# Patient Record
Sex: MALE | Race: WHITE | Employment: FULL TIME | URBAN - METROPOLITAN AREA
[De-identification: names, ages, dates, MRNs, and addresses within clinical notes are randomized per-mention and may not be internally consistent; named-entity substitution may affect disease eponyms.]

---

## 2021-03-09 ENCOUNTER — HOSPITAL ENCOUNTER (OUTPATIENT)
Age: 76
Setting detail: OUTPATIENT SURGERY
Discharge: HOME | End: 2021-03-09
Attending: INTERNAL MEDICINE | Admitting: INTERNAL MEDICINE
Payer: COMMERCIAL

## 2021-03-09 VITALS
RESPIRATION RATE: 20 BRPM | DIASTOLIC BLOOD PRESSURE: 66 MMHG | BODY MASS INDEX: 24.11 KG/M2 | WEIGHT: 178 LBS | TEMPERATURE: 97.8 F | SYSTOLIC BLOOD PRESSURE: 138 MMHG | HEART RATE: 76 BPM | HEIGHT: 72 IN | OXYGEN SATURATION: 100 %

## 2021-03-09 DIAGNOSIS — I35.0 AORTIC VALVE STENOSIS, ETIOLOGY OF CARDIAC VALVE DISEASE UNSPECIFIED: ICD-10-CM

## 2021-03-09 LAB
GLUCOSE BLD STRIP.AUTO-MCNC: 136 MG/DL (ref 65–100)
SERVICE CMNT-IMP: ABNORMAL

## 2021-03-09 PROCEDURE — 77030013744: Performed by: INTERNAL MEDICINE

## 2021-03-09 PROCEDURE — 74011250636 HC RX REV CODE- 250/636: Performed by: INTERNAL MEDICINE

## 2021-03-09 PROCEDURE — 77030004532 HC CATH ANGI DX IMP BSC -A: Performed by: INTERNAL MEDICINE

## 2021-03-09 PROCEDURE — 74011000250 HC RX REV CODE- 250: Performed by: INTERNAL MEDICINE

## 2021-03-09 PROCEDURE — 77030019569 HC BND COMPR RAD TERU -B: Performed by: INTERNAL MEDICINE

## 2021-03-09 PROCEDURE — 77030010221 HC SPLNT WR POS TELE -B: Performed by: INTERNAL MEDICINE

## 2021-03-09 PROCEDURE — 93454 CORONARY ARTERY ANGIO S&I: CPT | Performed by: INTERNAL MEDICINE

## 2021-03-09 PROCEDURE — 99152 MOD SED SAME PHYS/QHP 5/>YRS: CPT | Performed by: INTERNAL MEDICINE

## 2021-03-09 PROCEDURE — 77030015766: Performed by: INTERNAL MEDICINE

## 2021-03-09 PROCEDURE — C1769 GUIDE WIRE: HCPCS | Performed by: INTERNAL MEDICINE

## 2021-03-09 PROCEDURE — 74011000636 HC RX REV CODE- 636: Performed by: INTERNAL MEDICINE

## 2021-03-09 PROCEDURE — 82962 GLUCOSE BLOOD TEST: CPT

## 2021-03-09 PROCEDURE — C1894 INTRO/SHEATH, NON-LASER: HCPCS | Performed by: INTERNAL MEDICINE

## 2021-03-09 PROCEDURE — 99153 MOD SED SAME PHYS/QHP EA: CPT | Performed by: INTERNAL MEDICINE

## 2021-03-09 RX ORDER — SODIUM CHLORIDE 0.9 % (FLUSH) 0.9 %
5-40 SYRINGE (ML) INJECTION EVERY 8 HOURS
Status: DISCONTINUED | OUTPATIENT
Start: 2021-03-09 | End: 2021-03-09 | Stop reason: HOSPADM

## 2021-03-09 RX ORDER — ASPIRIN 81 MG/1
81 TABLET ORAL DAILY
Qty: 90 TAB | Refills: 4 | Status: SHIPPED | OUTPATIENT
Start: 2021-03-09 | End: 2021-03-18

## 2021-03-09 RX ORDER — ROSUVASTATIN CALCIUM 10 MG/1
10 TABLET, COATED ORAL
Qty: 90 TAB | Refills: 4 | Status: SHIPPED | OUTPATIENT
Start: 2021-03-09

## 2021-03-09 RX ORDER — MIDAZOLAM HYDROCHLORIDE 1 MG/ML
INJECTION, SOLUTION INTRAMUSCULAR; INTRAVENOUS AS NEEDED
Status: DISCONTINUED | OUTPATIENT
Start: 2021-03-09 | End: 2021-03-09 | Stop reason: HOSPADM

## 2021-03-09 RX ORDER — METFORMIN HYDROCHLORIDE 500 MG/1
500 TABLET ORAL
COMMUNITY

## 2021-03-09 RX ORDER — BISMUTH SUBSALICYLATE 262 MG
1 TABLET,CHEWABLE ORAL DAILY
COMMUNITY

## 2021-03-09 RX ORDER — HEPARIN SODIUM 200 [USP'U]/100ML
INJECTION, SOLUTION INTRAVENOUS
Status: COMPLETED | OUTPATIENT
Start: 2021-03-09 | End: 2021-03-09

## 2021-03-09 RX ORDER — DIPHENHYDRAMINE HYDROCHLORIDE 50 MG/ML
25 INJECTION, SOLUTION INTRAMUSCULAR; INTRAVENOUS
Status: DISCONTINUED | OUTPATIENT
Start: 2021-03-09 | End: 2021-03-09 | Stop reason: HOSPADM

## 2021-03-09 RX ORDER — PANTOPRAZOLE SODIUM 40 MG/1
40 TABLET, DELAYED RELEASE ORAL DAILY
COMMUNITY
End: 2022-04-20 | Stop reason: ALTCHOICE

## 2021-03-09 RX ORDER — HEPARIN SODIUM 1000 [USP'U]/ML
INJECTION, SOLUTION INTRAVENOUS; SUBCUTANEOUS AS NEEDED
Status: DISCONTINUED | OUTPATIENT
Start: 2021-03-09 | End: 2021-03-09 | Stop reason: HOSPADM

## 2021-03-09 RX ORDER — LIDOCAINE HYDROCHLORIDE 10 MG/ML
INJECTION INFILTRATION; PERINEURAL AS NEEDED
Status: DISCONTINUED | OUTPATIENT
Start: 2021-03-09 | End: 2021-03-09 | Stop reason: HOSPADM

## 2021-03-09 RX ORDER — FENTANYL CITRATE 50 UG/ML
INJECTION, SOLUTION INTRAMUSCULAR; INTRAVENOUS AS NEEDED
Status: DISCONTINUED | OUTPATIENT
Start: 2021-03-09 | End: 2021-03-09 | Stop reason: HOSPADM

## 2021-03-09 RX ORDER — LISINOPRIL 40 MG/1
40 TABLET ORAL DAILY
COMMUNITY
End: 2021-03-18

## 2021-03-09 RX ORDER — IBUPROFEN 200 MG
200 TABLET ORAL
COMMUNITY
End: 2021-03-09

## 2021-03-09 RX ORDER — SODIUM CHLORIDE 0.9 % (FLUSH) 0.9 %
5-40 SYRINGE (ML) INJECTION AS NEEDED
Status: DISCONTINUED | OUTPATIENT
Start: 2021-03-09 | End: 2021-03-09 | Stop reason: HOSPADM

## 2021-03-09 RX ORDER — NALOXONE HYDROCHLORIDE 0.4 MG/ML
0.4 INJECTION, SOLUTION INTRAMUSCULAR; INTRAVENOUS; SUBCUTANEOUS AS NEEDED
Status: DISCONTINUED | OUTPATIENT
Start: 2021-03-09 | End: 2021-03-09 | Stop reason: HOSPADM

## 2021-03-09 RX ORDER — SODIUM CHLORIDE 9 MG/ML
100 INJECTION, SOLUTION INTRAVENOUS CONTINUOUS
Status: DISCONTINUED | OUTPATIENT
Start: 2021-03-09 | End: 2021-03-09 | Stop reason: HOSPADM

## 2021-03-09 RX ORDER — VERAPAMIL HYDROCHLORIDE 2.5 MG/ML
INJECTION, SOLUTION INTRAVENOUS AS NEEDED
Status: DISCONTINUED | OUTPATIENT
Start: 2021-03-09 | End: 2021-03-09 | Stop reason: HOSPADM

## 2021-03-09 RX ORDER — SODIUM CHLORIDE 9 MG/ML
50 INJECTION, SOLUTION INTRAVENOUS CONTINUOUS
Status: DISCONTINUED | OUTPATIENT
Start: 2021-03-09 | End: 2021-03-09 | Stop reason: HOSPADM

## 2021-03-09 RX ORDER — AMIODARONE HYDROCHLORIDE 200 MG/1
200 TABLET ORAL
Status: ON HOLD | COMMUNITY
End: 2021-09-27

## 2021-03-09 NOTE — DISCHARGE INSTRUCTIONS
**You have only MILD blockages (at worst about 40%) that can be treated with aspirin and a cholesterol lowering medications. **Please start taking aspirin 81 mg daily and rosuvastatin 10 mg every night. **You will be contacted within about 7-10 days after your CT scan to schedule transcatheter aortic valve replacement (TAVR). YOUR CURRENT MEDICATIONS  Current Discharge Medication List      START taking these medications    Details   aspirin delayed-release 81 mg tablet Take 1 Tab by mouth daily. Indications: mild coronary artery disease  Qty: 90 Tab, Refills: 4      rosuvastatin (CRESTOR) 10 mg tablet Take 1 Tab by mouth nightly. Indications: hardening of the arteries due to plaque buildup (this medication helps lower cholesterol)  Qty: 90 Tab, Refills: 4         CONTINUE these medications which have NOT CHANGED    Details   amiodarone (CORDARONE) 200 mg tablet Take 200 mg by mouth.      lisinopriL (PRINIVIL, ZESTRIL) 40 mg tablet Take 40 mg by mouth daily. Only took 10 mg today pt stated due to his B/P      metFORMIN (GLUCOPHAGE) 500 mg tablet Take 500 mg by mouth daily (with breakfast). multivitamin (ONE A DAY) tablet Take 1 Tab by mouth daily. pantoprazole (PROTONIX) 40 mg tablet Take 40 mg by mouth daily. rivaroxaban (Xarelto) 20 mg tab tablet Take 20 mg by mouth daily (with dinner). STOP taking these medications       ibuprofen (AdviL) 200 mg tablet Comments:   Reason for Stopping: May increase risk for bleeding while on aspirin and rivaroxaban. Use acetaminophen (Tylenol) instead if needed for pain. After Your Cardiac Catheterization    Cardiac catheterization (also called cardiac cath) is an invasive imaging procedure that allows your doctor to look at your coronary arteries to diagnose coronary artery disease. It can also be used to measure pressures in your chambers, and evaluate the function of your heart.     Instructions for going home after Cardiac Catheterization    Care for the Catheter Insertion Site  Procedures may be performed in the femoral artery in the groin (in the area at the top of your thigh) or in the radial artery in your arm. When you go home, there will be a bandage (dressing) over the catheter insertion site (also called the wound site).  The morning after your procedure, you may take the dressing off. The easiest way to do this is when you are showering, get the tape and dressing wet and remove it.  After the bandage is removed, cover the area with a small adhesive bandage. It is normal for the catheter insertion site to be black and blue for a couple of days. The site may also be slightly swollen and pink, and there may be a small lump (about the size of a quarter) at the site.  Wash the catheter insertion site at least once daily with soap and water. Place soapy water on your hand or washcloth and gently wash the insertion site; do not rub.  Keep the area clean and dry when you are not showering.  Do not use powders, creams, lotions or ointment on the wound site.  Wear loose clothes and loose underwear.  Do not take a bath, tub soak, go in a Jacuzzi, or swim in a pool or lake for one week after the procedure.  You may notice a small lump at the site. This is normal and may last up to 6 weeks. CHECK THE CATHETER INSERTION SITE DAILY:    If bleeding at the cath site occurs, take a clean washcloth and apply direct pressure just above the puncture site for at least 15 minutes. Call 911 immediately if the bleeding is not controlled. Continue to apply direct pressure until an ambulance gets to your location. Do not try to drive yourself or have someone else drive you to the hospital.  Have the ambulance bring you to the emergency room. Activity Guidelines  Your doctor will tell you when you can resume activities. In general, you will need to take it easy for the first two days after you get home.  You can expect to feel tired and weak the day after the procedure. Take walks around your house and plan to rest during the day. For femoral cardiac cath   Do not strain during bowel movements for the first 3 to 4 days after the procedure to prevent bleeding from the catheter insertion site.  Avoid heavy lifting (more than 10 pounds) and pushing or pulling heavy objects for the first 5 to 7 days after the procedure.  Do not participate in strenuous activities for 5 days after the procedure. This includes most sports - jogging, golfing, play tennis, and bowling.  You may climb stairs if needed, but walk up and down the stairs more slowly than usual.    Gradually increase your activities until you reach your normal activity level within one week after the procedure. For radial cardiac cath   Do not participate in strenuous activities for 2 days after the procedure. This includes most sports - jogging, golfing, play tennis, and bowling.  Gradually increase your activities until you reach your normal activity level within two days after the procedure. Ask your doctor when it is safe to   Return to work.  Resume sexual activity.  Resume driving. Most people are able to resume driving within 24 hours after going home. Medications   Please review your medications with your doctor before you go home. Ask your doctor if you should continue taking the medications you were taking before the procedure.  If you have diabetes, your doctor may adjust your diabetes medications for one to two days after your procedure. Please be sure to ask for specific directions about taking your diabetes medication after the procedure.  Depending on the results of your procedure, your doctor may prescribe new medication. Please make sure you understand what medications you should be taking after the procedure and how often to take them.    You may use Tylenol 325mg 1-2 tablets every 6 hours as needed for pain or discomfort, unless otherwise instructed. If you have significant         discomfort more than 48 hours after your procedure, please call your physicians office.  If you take METFORMIN, do NOT take this for the next 2 days after your procedure. Fluid Guidelines  Be sure to drink eight to ten glasses of clear fluids (water is preferred) for the 24 hours to flush the contrast material from your system. Importance of a Heart-Healthy Lifestyle  It is important for you to be committed to leading a heart-healthy lifestyle. Your health care team can help you achieve your goals, but it is up to you to take your medications as prescribed, make dietary changes, quit smoking, exercise regularly, keep your follow-up appointments and be an active member of the treatment team.    Follow Up  Please call your cardiologist to schedule a follow-up appointment in the next 1-2 weeks if possible. Ask your primary care doctor when you should return for follow-up. Please ask your doctor if you have any questions about cardiac catheterization, angioplasty or stenting. If possible, have someone stay with you for the first night. It is normal to feel tired for the first couple of days. Take it easy and follow your physicians instructions on activity. CALL THE PHYSICIAN:  ? If the site becomes red, swollen, or feels warm to the touch, or is healing poorly    ? If you note any large/extending bruise, fever >101.0 or chills  ? If your extremity has numbness, tingling, discoloration, abnormal swelling, tightness or pain   ? If you have difficulty with urination or develop nausea, vomiting, or severe abdominal pain    SIGNS AND SYMPTOMS:  ? Notify your physician for new or recurrent symptoms of chest discomfort, unusual shortness of breath or fatigue. These could be signs of a problem and should be discussed with your physician. ?  For significant chest pain or symptoms of angina not relieved with rest:  if you have been prescribed Nitroglycerin, take as directed (taken under the tongue, one at a time 5 minutes apart for a total of 3 doses, sitting down). If the discomfort is not relieved after the 3rd Nitroglycerin, call 911. If you have not been prescribed Nitroglycerin and your chest discomfort is significant, call 911. Take the ambulance, do not try to drive yourself or have someone else drive you to the hospital.     AFTER CARE:  ? Follow up with your physician as instructed  ? Follow a heart healthy diet with proper portion control, daily stress management, daily exercise, blood pressure and cholesterol control, and smoking cessation. The success of your stent, if you had one placed, and the prevention of future catheterizations heavily depends on your lifestyle changes you make now! ? You may start walking short distances the day of your procedure. Gradually increase as tolerated each day. Current activity recommendations are 30 minutes of exercise at least 5 days a week. Work up to this as you recover. Walk, ride a bike, or choose any other activity you enjoy to reach this activity goal.   ? Avoid walking outside in extremes of heat or cold. Walk inside (at home, at the mall, or at a large store) when it is cold and windy or hot and humid. ? If you had a stent placed, consider [facility] as a resource to help you make the needed lifestyle changes to live a heart healthy lifestyle. Discuss your candidacy with your physician. If you have questions, call your physicians office or the Cardiac Cath Lab at [phone]. The Cath Lab is operational from 6:30 a.m. to 5:00 p.m., Monday through Friday. After hours, notify your physician. [address] [place] [address] can be reached at [phone]. [facility] is operational Monday-Thursday 8:30 a.m. to 5:00 p.m. and Friday 8:30 a.m. to 12:00 p.m.     Remember:  IN CASE OF BLEEDING: KEEP FIRM PRESSURE ON THE PROCEDURE SITE AND CALL 911 IF NOT CONTROLLED

## 2021-03-09 NOTE — PROGRESS NOTES
Discharged via w/c with wife, instructions, rx for asa and crestor, and belongings  Reviewed d/c instructions with pt and wife, teach back method used

## 2021-03-09 NOTE — PROGRESS NOTES
Cardiac Cath Lab Recovery Arrival Note:      Oskar Okeefe arrived to Cardiac Cath Lab, Recovery Area. Staff introduced to patient. Patient identifiers verified with NAME and DATE OF BIRTH. Procedure verified with patient. Consent forms reviewed and signed by patient or authorized representative and verified. Allergies verified. Patient and family oriented to department. Patient and family informed of procedure and plan of care. Questions answered with review. Patient prepped for procedure, per orders from physician, prior to arrival.    Patient on cardiac monitor, non-invasive blood pressure, SPO2 monitor. On room air. Patient is A&Ox 4. Patient reports no complaints. Patient in stretcher, in low position, with side rails up, call bell within reach, patient instructed to call if assistance as needed. Patient prep in: [address], [place]. Patient family has pager # [phone]  Family in: outside hospital.   Prep by: Alberto Connell RN    Pre cath teaching completed  800 Dr Joaquim Reece in to talk with pt.

## 2021-03-09 NOTE — PROGRESS NOTES
Cardiac Cath Lab Procedure Area Arrival Note:    Tana Patches arrived to Cardiac Cath Lab, Procedure Area. Patient identifiers verified with NAME and DATE OF BIRTH. Procedure verified with patient. Consent forms verified. Allergies verified. Patient informed of procedure and plan of care. Questions answered with review. Patient voiced understanding of procedure and plan of care. Patient on cardiac monitor, non-invasive blood pressure, SPO2 monitor. On RA and placed on O2 @ 2 lpm via NC.  IV of NS on pump at 75 ml/hr. Patient status doing well without problems. Patient is A&Ox 4. Patient reports no chest pain or shortness of breath. Patient medicated during procedure with orders obtained and verified by Dr. Mariano Villanueva. Refer to patients Cardiac Cath Lab PROCEDURE REPORT for vital signs, assessment, status, and response during procedure, printed at end of case. Printed report on chart or scanned into chart. 11:20- Transfer to [facility] RR from Procedure Area    Verbal report given to Will on Tana Patches being transferred to Cardiac Cath Lab RR for routine progression of care   Patient is post [facility] procedure. Patient stable upon transfer to . Report consisted of patients Situation, Background, Assessment and   Recommendations(SBAR). Information from the following report(s) SBAR and Procedure Summary was reviewed with the receiving nurse. Opportunity for questions and clarification was provided. Patient medicated during procedure with orders obtained and verified by Dr. Mariano Villanueva. Refer to patient PROCEDURE REPORT for vital signs, assessment, status, and response during procedure.

## 2021-03-09 NOTE — PROGRESS NOTES
Discharge instructions reviewed with pt and wife via the phone. 1325 ambulated 100 ft, gait steady. Voided, back to stretcher assisted with dressing.

## 2021-03-09 NOTE — PROGRESS NOTES
TRANSFER - IN REPORT:    Verbal report received from quincy Teran on Aroldo Claire  being received from procedure for routine progression of care. Report consisted of patients Situation, Background, Assessment and Recommendations(SBAR). Information from the following report(s) Procedure Summary, MAR, Accordion and Med Rec Status was reviewed with the receiving clinician. Opportunity for questions and clarification was provided. Assessment completed upon patients arrival to [address] [place] [address] and care assumed. Cardiac Cath Lab Recovery Arrival Note:    Aroldo Claire arrived to [facility] recovery area. Patient procedure= LHC. Patient on cardiac monitor, non-invasive blood pressure, SPO2 monitor. On room air. IV  of nacl on pump at 50 ml/hr. Patient status doing well with no problems. Patient is A&Ox 4. Patient reports no complaints. PROCEDURE SITE CHECK:    Procedure site:without any bleeding and or hematoma, no pain/discomfort reported at procedure site. No change in patient status. Continue to monitor patient and status. Dr Akash Don in to talk with pt and called wife on phone. 1145 tolerated drink, refused food.

## 2021-03-14 ENCOUNTER — HOSPITAL ENCOUNTER (INPATIENT)
Age: 76
LOS: 4 days | Discharge: HOME | DRG: 378 | End: 2021-03-18
Attending: EMERGENCY MEDICINE | Admitting: FAMILY MEDICINE
Payer: COMMERCIAL

## 2021-03-14 ENCOUNTER — APPOINTMENT (OUTPATIENT)
Dept: GENERAL RADIOLOGY | Age: 76
DRG: 378 | End: 2021-03-14
Attending: EMERGENCY MEDICINE
Payer: COMMERCIAL

## 2021-03-14 ENCOUNTER — APPOINTMENT (OUTPATIENT)
Dept: ULTRASOUND IMAGING | Age: 76
DRG: 378 | End: 2021-03-14
Attending: INTERNAL MEDICINE
Payer: COMMERCIAL

## 2021-03-14 DIAGNOSIS — K63.89 MASS OF COLON: ICD-10-CM

## 2021-03-14 DIAGNOSIS — K92.2 GASTROINTESTINAL HEMORRHAGE, UNSPECIFIED GASTROINTESTINAL HEMORRHAGE TYPE: ICD-10-CM

## 2021-03-14 DIAGNOSIS — D64.9 ANEMIA, UNSPECIFIED TYPE: Primary | ICD-10-CM

## 2021-03-14 DIAGNOSIS — M25.561 ACUTE PAIN OF RIGHT KNEE: ICD-10-CM

## 2021-03-14 DIAGNOSIS — I35.0 NONRHEUMATIC AORTIC VALVE STENOSIS: ICD-10-CM

## 2021-03-14 LAB
ALBUMIN SERPL-MCNC: 3.2 G/DL (ref 3.5–5)
ALBUMIN/GLOB SERPL: 0.9 {RATIO} (ref 1.1–2.2)
ALP SERPL-CCNC: 69 U/L (ref 45–117)
ALT SERPL-CCNC: 23 U/L (ref 12–78)
ANION GAP SERPL CALC-SCNC: 7 MMOL/L (ref 5–15)
AST SERPL-CCNC: 24 U/L (ref 15–37)
BILIRUB SERPL-MCNC: 0.6 MG/DL (ref 0.2–1)
BUN SERPL-MCNC: 44 MG/DL (ref 6–20)
BUN/CREAT SERPL: 38 (ref 12–20)
CALCIUM SERPL-MCNC: 8.9 MG/DL (ref 8.5–10.1)
CHLORIDE SERPL-SCNC: 109 MMOL/L (ref 97–108)
CO2 SERPL-SCNC: 20 MMOL/L (ref 21–32)
COMMENT, HOLDF: NORMAL
CREAT SERPL-MCNC: 1.16 MG/DL (ref 0.7–1.3)
D DIMER PPP FEU-MCNC: 0.19 MG/L FEU (ref 0–0.65)
GLOBULIN SER CALC-MCNC: 3.4 G/DL (ref 2–4)
GLUCOSE SERPL-MCNC: 193 MG/DL (ref 65–100)
HEMOCCULT STL QL: POSITIVE
HISTORY CHECKED?,CKHIST: NORMAL
MAGNESIUM SERPL-MCNC: 2.1 MG/DL (ref 1.6–2.4)
POTASSIUM SERPL-SCNC: 4.2 MMOL/L (ref 3.5–5.1)
PROT SERPL-MCNC: 6.6 G/DL (ref 6.4–8.2)
SAMPLES BEING HELD,HOLD: NORMAL
SODIUM SERPL-SCNC: 136 MMOL/L (ref 136–145)
TROPONIN I SERPL-MCNC: <0.05 NG/ML

## 2021-03-14 PROCEDURE — 93005 ELECTROCARDIOGRAM TRACING: CPT

## 2021-03-14 PROCEDURE — 36430 TRANSFUSION BLD/BLD COMPNT: CPT

## 2021-03-14 PROCEDURE — 65270000029 HC RM PRIVATE

## 2021-03-14 PROCEDURE — 71045 X-RAY EXAM CHEST 1 VIEW: CPT

## 2021-03-14 PROCEDURE — 83735 ASSAY OF MAGNESIUM: CPT

## 2021-03-14 PROCEDURE — 84484 ASSAY OF TROPONIN QUANT: CPT

## 2021-03-14 PROCEDURE — 86900 BLOOD TYPING SEROLOGIC ABO: CPT

## 2021-03-14 PROCEDURE — 82272 OCCULT BLD FECES 1-3 TESTS: CPT

## 2021-03-14 PROCEDURE — 2709999900 HC NON-CHARGEABLE SUPPLY

## 2021-03-14 PROCEDURE — 80053 COMPREHEN METABOLIC PANEL: CPT

## 2021-03-14 PROCEDURE — 36415 COLL VENOUS BLD VENIPUNCTURE: CPT

## 2021-03-14 PROCEDURE — 76705 ECHO EXAM OF ABDOMEN: CPT

## 2021-03-14 PROCEDURE — P9016 RBC LEUKOCYTES REDUCED: HCPCS

## 2021-03-14 PROCEDURE — 30233N1 TRANSFUSION OF NONAUTOLOGOUS RED BLOOD CELLS INTO PERIPHERAL VEIN, PERCUTANEOUS APPROACH: ICD-10-PCS | Performed by: INTERNAL MEDICINE

## 2021-03-14 PROCEDURE — 86923 COMPATIBILITY TEST ELECTRIC: CPT

## 2021-03-14 PROCEDURE — 99285 EMERGENCY DEPT VISIT HI MDM: CPT

## 2021-03-14 PROCEDURE — 85379 FIBRIN DEGRADATION QUANT: CPT

## 2021-03-14 PROCEDURE — 94762 N-INVAS EAR/PLS OXIMTRY CONT: CPT

## 2021-03-14 PROCEDURE — 85025 COMPLETE CBC W/AUTO DIFF WBC: CPT

## 2021-03-14 RX ORDER — ROSUVASTATIN CALCIUM 10 MG/1
10 TABLET, COATED ORAL
Status: DISCONTINUED | OUTPATIENT
Start: 2021-03-15 | End: 2021-03-18 | Stop reason: HOSPADM

## 2021-03-14 RX ORDER — MAGNESIUM SULFATE HEPTAHYDRATE 40 MG/ML
2 INJECTION, SOLUTION INTRAVENOUS ONCE
Status: COMPLETED | OUTPATIENT
Start: 2021-03-14 | End: 2021-03-15

## 2021-03-14 RX ORDER — AMIODARONE HYDROCHLORIDE 200 MG/1
200 TABLET ORAL DAILY
Status: DISCONTINUED | OUTPATIENT
Start: 2021-03-15 | End: 2021-03-18 | Stop reason: HOSPADM

## 2021-03-14 RX ORDER — SODIUM CHLORIDE 9 MG/ML
250 INJECTION, SOLUTION INTRAVENOUS AS NEEDED
Status: DISCONTINUED | OUTPATIENT
Start: 2021-03-14 | End: 2021-03-18 | Stop reason: HOSPADM

## 2021-03-14 RX ORDER — SODIUM CHLORIDE 0.9 % (FLUSH) 0.9 %
5-40 SYRINGE (ML) INJECTION EVERY 8 HOURS
Status: DISCONTINUED | OUTPATIENT
Start: 2021-03-14 | End: 2021-03-18 | Stop reason: HOSPADM

## 2021-03-14 RX ORDER — SODIUM CHLORIDE 0.9 % (FLUSH) 0.9 %
5-40 SYRINGE (ML) INJECTION AS NEEDED
Status: DISCONTINUED | OUTPATIENT
Start: 2021-03-14 | End: 2021-03-18 | Stop reason: HOSPADM

## 2021-03-14 NOTE — ED PROVIDER NOTES
This is a 79-year-old male with a history of aortic valve stenosis and recent cardiac catheterization by Dr. Burnard Dakin. He also had a chest CT this past week and is scheduled for TAVR procedure on the 24th of this month. He says that the other day when he had his catheterization, he had to take metoprolol now and a half prior to the study. He says since that time he has felt extremely weak and washed out. He gets extremely tired with any type of activity. He has had no chest pain or abdominal pain. He has had no fever or chills and he denies any changes in color of his stool or blood in his urine. He is on Xarelto at this time. His appetite has been good and he feels fine as long as he is at rest.  As soon as he gets up to move around he gets extremely fatigued and short of breath. Again, there is been no chest pain or abdominal pain. Patient voices no other acute complaints. He has held his lisinopril recently until his pressures become more stable. No past medical history on file. No past surgical history on file. No family history on file.     Social History     Socioeconomic History    Marital status:      Spouse name: Not on file    Number of children: Not on file    Years of education: Not on file    Highest education level: Not on file   Occupational History    Not on file   Social Needs    Financial resource strain: Not on file    Food insecurity     Worry: Not on file     Inability: Not on file    Transportation needs     Medical: Not on file     Non-medical: Not on file   Tobacco Use    Smoking status: Not on file   Substance and Sexual Activity    Alcohol use: Not on file    Drug use: Not on file    Sexual activity: Not on file   Lifestyle    Physical activity     Days per week: Not on file     Minutes per session: Not on file    Stress: Not on file   Relationships    Social connections     Talks on phone: Not on file     Gets together: Not on file     Attends [employer] service: Not on file     Active member of club or organization: Not on file     Attends meetings of clubs or organizations: Not on file     Relationship status: Not on file    Intimate partner violence     Fear of current or ex partner: Not on file     Emotionally abused: Not on file     Physically abused: Not on file     Forced sexual activity: Not on file   Other Topics Concern    Not on file   Social History Narrative    Not on file         ALLERGIES: Patient has no known allergies. Review of Systems   Constitutional: Positive for fatigue. Negative for activity change, appetite change, chills and fever. HENT: Negative for ear pain, facial swelling, sore throat and trouble swallowing. Eyes: Negative for pain, discharge and visual disturbance. Respiratory: Positive for shortness of breath ( Extreme with activity). Negative for chest tightness and wheezing. Cardiovascular: Negative for chest pain and palpitations. Gastrointestinal: Negative for abdominal pain, blood in stool, nausea and vomiting. Genitourinary: Negative for difficulty urinating, flank pain and hematuria. Musculoskeletal: Negative for arthralgias, joint swelling, myalgias and neck pain. Skin: Negative for color change and rash. Neurological: Positive for weakness. Negative for dizziness, numbness and headaches. Hematological: Negative for adenopathy. Does not bruise/bleed easily. Psychiatric/Behavioral: Negative for behavioral problems, confusion and sleep disturbance. All other systems reviewed and are negative. Vitals:    03/14/21 1819   BP: (!) 142/57   Pulse: (!) 108   Resp: 18   Temp: 98.8 °F (37.1 °C)   SpO2: 100%            Physical Exam  Vitals signs and nursing note reviewed. Constitutional:       General: He is not in acute distress. Appearance: He is well-developed. Comments: Patient appears extremely pale with pale conjunctive he is well.   He is able to speak in full sentences and does not appear in any respiratory distress. His oxygen saturations are at 100% and his blood pressure is 142/57 with no fever. HENT:      Head: Normocephalic and atraumatic. Nose: Nose normal.   Eyes:      General: No scleral icterus. Conjunctiva/sclera: Conjunctivae normal.      Pupils: Pupils are equal, round, and reactive to light. Comments: Conjunctive a are extremely pale   Neck:      Musculoskeletal: Normal range of motion and neck supple. Thyroid: No thyromegaly. Vascular: No JVD. Trachea: No tracheal deviation. Comments: No carotid bruits noted. Cardiovascular:      Rate and Rhythm: Normal rate and regular rhythm. Heart sounds: Normal heart sounds. No murmur. No friction rub. No gallop. Pulmonary:      Effort: Pulmonary effort is normal. No respiratory distress. Breath sounds: Normal breath sounds. No wheezing or rales. Chest:      Chest wall: No tenderness. Abdominal:      General: Bowel sounds are normal. There is no distension. Palpations: Abdomen is soft. There is no mass. Tenderness: There is no abdominal tenderness. There is no guarding or rebound. Genitourinary:     Rectum: Guaiac result positive. Musculoskeletal: Normal range of motion. General: No tenderness. Lymphadenopathy:      Cervical: No cervical adenopathy. Skin:     General: Skin is warm and dry. Coloration: Skin is pale. Findings: No erythema or rash. Neurological:      Mental Status: He is alert and oriented to person, place, and time. Cranial Nerves: No cranial nerve deficit. Coordination: Coordination normal.      Deep Tendon Reflexes: Reflexes are normal and symmetric. Psychiatric:         Behavior: Behavior normal.         Thought Content:  Thought content normal.         Judgment: Judgment normal.          MDM  Number of Diagnoses or Management Options  Anemia, unspecified type: new and requires workup  Gastrointestinal hemorrhage, unspecified gastrointestinal hemorrhage type: new and requires workup     Amount and/or Complexity of Data Reviewed  Clinical lab tests: ordered and reviewed  Decide to obtain previous medical records or to obtain history from someone other than the patient: yes  Review and summarize past medical records: yes  Discuss the patient with other providers: yes    Risk of Complications, Morbidity, and/or Mortality  Presenting problems: high  Diagnostic procedures: high  Management options: high    Patient Progress  Patient progress: stable         Procedures    We will discuss the patient's results with cardiology once the labs are back. We will also obtain orthostatic blood pressures. Blood work is pending. Patient is comfortable sitting on the stretcher but again extremely pale in appearance. ED MD EKG interpretation: There is a normal sinus rhythm at 99 beats a minute. There is ST depression in 1-3 and aVF. There is also depression in V4 through V6 without any old tracing to compare. There is no ectopy noted. Elio Moore MD         Perfect Serve Consult for Admission  7:19 PM    ED Room Number: ER07/07  Patient Name and age:  Bonnie Ross 70 y.o.  male  Working Diagnosis:   1. Anemia, unspecified type    2. Gastrointestinal hemorrhage, unspecified gastrointestinal hemorrhage type        COVID-19 Suspicion:  no  Sepsis present:  no  Reassessment needed: no  Code Status:  Full Code  Readmission: yes  Isolation Requirements:  yes  Recommended Level of Care:  telemetry  Department:[facility] Adult ED - 21   Other:  Gi consulted    I have spoken with both Dr. Jalen Lisa and Dr. Jossy Rowell. They will make arrangements to have someone follow this patient.

## 2021-03-14 NOTE — ED NOTES
Pt arrives via wheelchair from home with c/o SOB, dizziness and weakness progressively getting worse since Cardiac Cath on 3/10/21. Pt SOB and pale with weakness and dizziness in triage. Denies CP.

## 2021-03-15 ENCOUNTER — ANESTHESIA (OUTPATIENT)
Dept: ENDOSCOPY | Age: 76
DRG: 378 | End: 2021-03-15
Payer: COMMERCIAL

## 2021-03-15 ENCOUNTER — ANESTHESIA EVENT (OUTPATIENT)
Dept: ENDOSCOPY | Age: 76
DRG: 378 | End: 2021-03-15
Payer: COMMERCIAL

## 2021-03-15 LAB
ALBUMIN SERPL-MCNC: 2.8 G/DL (ref 3.5–5)
ALBUMIN/GLOB SERPL: 1 {RATIO} (ref 1.1–2.2)
ALP SERPL-CCNC: 59 U/L (ref 45–117)
ALT SERPL-CCNC: 18 U/L (ref 12–78)
ANION GAP SERPL CALC-SCNC: 5 MMOL/L (ref 5–15)
AST SERPL-CCNC: 18 U/L (ref 15–37)
ATRIAL RATE: 99 BPM
BASOPHILS # BLD: 0 K/UL (ref 0–0.1)
BASOPHILS # BLD: 0 K/UL (ref 0–0.1)
BASOPHILS NFR BLD: 0 % (ref 0–1)
BASOPHILS NFR BLD: 0 % (ref 0–1)
BILIRUB SERPL-MCNC: 0.6 MG/DL (ref 0.2–1)
BUN SERPL-MCNC: 37 MG/DL (ref 6–20)
BUN/CREAT SERPL: 41 (ref 12–20)
CALCIUM SERPL-MCNC: 8.1 MG/DL (ref 8.5–10.1)
CALCULATED P AXIS, ECG09: 61 DEGREES
CALCULATED R AXIS, ECG10: 58 DEGREES
CALCULATED T AXIS, ECG11: 73 DEGREES
CHLORIDE SERPL-SCNC: 112 MMOL/L (ref 97–108)
CO2 SERPL-SCNC: 22 MMOL/L (ref 21–32)
COVID-19 RAPID TEST, COVR: NOT DETECTED
CREAT SERPL-MCNC: 0.91 MG/DL (ref 0.7–1.3)
DIAGNOSIS, 93000: NORMAL
DIFFERENTIAL METHOD BLD: ABNORMAL
DIFFERENTIAL METHOD BLD: ABNORMAL
EOSINOPHIL # BLD: 0 K/UL (ref 0–0.4)
EOSINOPHIL # BLD: 0 K/UL (ref 0–0.4)
EOSINOPHIL NFR BLD: 0 % (ref 0–7)
EOSINOPHIL NFR BLD: 0 % (ref 0–7)
ERYTHROCYTE [DISTWIDTH] IN BLOOD BY AUTOMATED COUNT: 13.7 % (ref 11.5–14.5)
ERYTHROCYTE [DISTWIDTH] IN BLOOD BY AUTOMATED COUNT: 14.8 % (ref 11.5–14.5)
FERRITIN SERPL-MCNC: 8 NG/ML (ref 26–388)
GLOBULIN SER CALC-MCNC: 2.9 G/DL (ref 2–4)
GLUCOSE SERPL-MCNC: 125 MG/DL (ref 65–100)
HCT VFR BLD AUTO: 14 % (ref 36.6–50.3)
HCT VFR BLD AUTO: 19.1 % (ref 36.6–50.3)
HGB BLD-MCNC: 4.2 G/DL (ref 12.1–17)
HGB BLD-MCNC: 6 G/DL (ref 12.1–17)
HGB BLD-MCNC: 7.5 G/DL (ref 12.1–17)
HISTORY CHECKED?,CKHIST: NORMAL
HISTORY CHECKED?,CKHIST: NORMAL
IMM GRANULOCYTES # BLD AUTO: 0 K/UL (ref 0–0.04)
IMM GRANULOCYTES # BLD AUTO: 0 K/UL (ref 0–0.04)
IMM GRANULOCYTES NFR BLD AUTO: 0 % (ref 0–0.5)
IMM GRANULOCYTES NFR BLD AUTO: 1 % (ref 0–0.5)
INR PPP: 1.2 (ref 0.9–1.1)
LYMPHOCYTES # BLD: 0.5 K/UL (ref 0.8–3.5)
LYMPHOCYTES # BLD: 0.8 K/UL (ref 0.8–3.5)
LYMPHOCYTES NFR BLD: 13 % (ref 12–49)
LYMPHOCYTES NFR BLD: 14 % (ref 12–49)
MCH RBC QN AUTO: 26.6 PG (ref 26–34)
MCH RBC QN AUTO: 27.9 PG (ref 26–34)
MCHC RBC AUTO-ENTMCNC: 30 G/DL (ref 30–36.5)
MCHC RBC AUTO-ENTMCNC: 31.4 G/DL (ref 30–36.5)
MCV RBC AUTO: 88.6 FL (ref 80–99)
MCV RBC AUTO: 88.8 FL (ref 80–99)
MONOCYTES # BLD: 0.4 K/UL (ref 0–1)
MONOCYTES # BLD: 0.5 K/UL (ref 0–1)
MONOCYTES NFR BLD: 10 % (ref 5–13)
MONOCYTES NFR BLD: 9 % (ref 5–13)
NEUTS SEG # BLD: 3.2 K/UL (ref 1.8–8)
NEUTS SEG # BLD: 4.2 K/UL (ref 1.8–8)
NEUTS SEG NFR BLD: 76 % (ref 32–75)
NEUTS SEG NFR BLD: 77 % (ref 32–75)
NRBC # BLD: 0 K/UL (ref 0–0.01)
NRBC # BLD: 0.02 K/UL (ref 0–0.01)
NRBC BLD-RTO: 0 PER 100 WBC
NRBC BLD-RTO: 0.4 PER 100 WBC
P-R INTERVAL, ECG05: 176 MS
PATH REV BLD -IMP: ABNORMAL
PLATELET # BLD AUTO: 130 K/UL (ref 150–400)
PLATELET # BLD AUTO: 94 K/UL (ref 150–400)
PMV BLD AUTO: 9.3 FL (ref 8.9–12.9)
PMV BLD AUTO: 9.8 FL (ref 8.9–12.9)
POTASSIUM SERPL-SCNC: 3.8 MMOL/L (ref 3.5–5.1)
PROT SERPL-MCNC: 5.7 G/DL (ref 6.4–8.2)
PROTHROMBIN TIME: 12.3 SEC (ref 9–11.1)
Q-T INTERVAL, ECG07: 376 MS
QRS DURATION, ECG06: 88 MS
QTC CALCULATION (BEZET), ECG08: 482 MS
RBC # BLD AUTO: 1.58 M/UL (ref 4.1–5.7)
RBC # BLD AUTO: 2.15 M/UL (ref 4.1–5.7)
RBC MORPH BLD: ABNORMAL
RETICS # AUTO: 0.1 M/UL (ref 0.03–0.1)
RETICS/RBC NFR AUTO: 4.4 % (ref 0.7–2.1)
SARS-COV-2, COV2: NORMAL
SODIUM SERPL-SCNC: 139 MMOL/L (ref 136–145)
SOURCE, COVRS: NORMAL
VENTRICULAR RATE, ECG03: 99 BPM
WBC # BLD AUTO: 4.1 K/UL (ref 4.1–11.1)
WBC # BLD AUTO: 5.5 K/UL (ref 4.1–11.1)
WBC MORPH BLD: ABNORMAL

## 2021-03-15 PROCEDURE — 0DJ08ZZ INSPECTION OF UPPER INTESTINAL TRACT, VIA NATURAL OR ARTIFICIAL OPENING ENDOSCOPIC: ICD-10-PCS | Performed by: INTERNAL MEDICINE

## 2021-03-15 PROCEDURE — 74011250636 HC RX REV CODE- 250/636: Performed by: INTERNAL MEDICINE

## 2021-03-15 PROCEDURE — C9113 INJ PANTOPRAZOLE SODIUM, VIA: HCPCS | Performed by: FAMILY MEDICINE

## 2021-03-15 PROCEDURE — P9016 RBC LEUKOCYTES REDUCED: HCPCS

## 2021-03-15 PROCEDURE — 76040000019: Performed by: INTERNAL MEDICINE

## 2021-03-15 PROCEDURE — 85045 AUTOMATED RETICULOCYTE COUNT: CPT

## 2021-03-15 PROCEDURE — 87635 SARS-COV-2 COVID-19 AMP PRB: CPT

## 2021-03-15 PROCEDURE — 76060000031 HC ANESTHESIA FIRST 0.5 HR: Performed by: INTERNAL MEDICINE

## 2021-03-15 PROCEDURE — 2709999900 HC NON-CHARGEABLE SUPPLY: Performed by: INTERNAL MEDICINE

## 2021-03-15 PROCEDURE — 74011000250 HC RX REV CODE- 250: Performed by: FAMILY MEDICINE

## 2021-03-15 PROCEDURE — 74011000250 HC RX REV CODE- 250: Performed by: INTERNAL MEDICINE

## 2021-03-15 PROCEDURE — 80053 COMPREHEN METABOLIC PANEL: CPT

## 2021-03-15 PROCEDURE — 85025 COMPLETE CBC W/AUTO DIFF WBC: CPT

## 2021-03-15 PROCEDURE — 74011250636 HC RX REV CODE- 250/636: Performed by: NURSE ANESTHETIST, CERTIFIED REGISTERED

## 2021-03-15 PROCEDURE — 74011250636 HC RX REV CODE- 250/636: Performed by: FAMILY MEDICINE

## 2021-03-15 PROCEDURE — 2709999900 HC NON-CHARGEABLE SUPPLY

## 2021-03-15 PROCEDURE — 36430 TRANSFUSION BLD/BLD COMPNT: CPT

## 2021-03-15 PROCEDURE — 36415 COLL VENOUS BLD VENIPUNCTURE: CPT

## 2021-03-15 PROCEDURE — 85018 HEMOGLOBIN: CPT

## 2021-03-15 PROCEDURE — 82728 ASSAY OF FERRITIN: CPT

## 2021-03-15 PROCEDURE — 85610 PROTHROMBIN TIME: CPT

## 2021-03-15 PROCEDURE — 65660000000 HC RM CCU STEPDOWN

## 2021-03-15 PROCEDURE — 74011250637 HC RX REV CODE- 250/637: Performed by: FAMILY MEDICINE

## 2021-03-15 RX ORDER — PROPOFOL 10 MG/ML
INJECTION, EMULSION INTRAVENOUS AS NEEDED
Status: DISCONTINUED | OUTPATIENT
Start: 2021-03-15 | End: 2021-03-15 | Stop reason: HOSPADM

## 2021-03-15 RX ORDER — SODIUM CHLORIDE 0.9 % (FLUSH) 0.9 %
5-40 SYRINGE (ML) INJECTION EVERY 8 HOURS
Status: CANCELLED | OUTPATIENT
Start: 2021-03-15

## 2021-03-15 RX ORDER — SODIUM CHLORIDE 9 MG/ML
50 INJECTION, SOLUTION INTRAVENOUS CONTINUOUS
Status: CANCELLED | OUTPATIENT
Start: 2021-03-15 | End: 2021-03-15

## 2021-03-15 RX ORDER — SODIUM CHLORIDE 9 MG/ML
250 INJECTION, SOLUTION INTRAVENOUS AS NEEDED
Status: DISCONTINUED | OUTPATIENT
Start: 2021-03-15 | End: 2021-03-18 | Stop reason: HOSPADM

## 2021-03-15 RX ORDER — SODIUM CHLORIDE 0.9 % (FLUSH) 0.9 %
5-40 SYRINGE (ML) INJECTION AS NEEDED
Status: CANCELLED | OUTPATIENT
Start: 2021-03-15

## 2021-03-15 RX ORDER — POLYETHYLENE GLYCOL 3350, SODIUM SULFATE, SODIUM CHLORIDE, POTASSIUM CHLORIDE, SODIUM ASCORBATE, AND ASCORBIC ACID 7.5-2.691G
2 KIT ORAL ONCE
Status: COMPLETED | OUTPATIENT
Start: 2021-03-15 | End: 2021-03-15

## 2021-03-15 RX ORDER — POLYETHYLENE GLYCOL 3350, SODIUM SULFATE ANHYDROUS, SODIUM BICARBONATE, SODIUM CHLORIDE, POTASSIUM CHLORIDE 236; 22.74; 6.74; 5.86; 2.97 G/4L; G/4L; G/4L; G/4L; G/4L
4000 POWDER, FOR SOLUTION ORAL ONCE
Status: DISCONTINUED | OUTPATIENT
Start: 2021-03-15 | End: 2021-03-15 | Stop reason: RX

## 2021-03-15 RX ORDER — PHENYLEPHRINE HCL IN 0.9% NACL 0.4MG/10ML
SYRINGE (ML) INTRAVENOUS AS NEEDED
Status: DISCONTINUED | OUTPATIENT
Start: 2021-03-15 | End: 2021-03-15 | Stop reason: HOSPADM

## 2021-03-15 RX ORDER — SODIUM CHLORIDE 9 MG/ML
INJECTION, SOLUTION INTRAVENOUS
Status: DISCONTINUED | OUTPATIENT
Start: 2021-03-15 | End: 2021-03-15 | Stop reason: HOSPADM

## 2021-03-15 RX ADMIN — POLYETHYLENE GLYCOL 3350, SODIUM SULFATE, SODIUM CHLORIDE, POTASSIUM CHLORIDE, ASCORBIC ACID, SODIUM ASCORBATE 2 L: KIT at 20:46

## 2021-03-15 RX ADMIN — PROPOFOL 50 MG: 10 INJECTION, EMULSION INTRAVENOUS at 16:21

## 2021-03-15 RX ADMIN — SODIUM CHLORIDE: 900 INJECTION, SOLUTION INTRAVENOUS at 16:15

## 2021-03-15 RX ADMIN — PROPOFOL 25 MG: 10 INJECTION, EMULSION INTRAVENOUS at 16:23

## 2021-03-15 RX ADMIN — PHENYLEPHRINE HYDROCHLORIDE 80 MCG: 10 INJECTION INTRAVENOUS at 16:31

## 2021-03-15 RX ADMIN — Medication 10 ML: at 09:37

## 2021-03-15 RX ADMIN — SODIUM CHLORIDE 40 MG: 9 INJECTION INTRAMUSCULAR; INTRAVENOUS; SUBCUTANEOUS at 09:36

## 2021-03-15 RX ADMIN — Medication 10 ML: at 21:02

## 2021-03-15 RX ADMIN — PHENYLEPHRINE HYDROCHLORIDE 40 MCG: 10 INJECTION INTRAVENOUS at 16:23

## 2021-03-15 RX ADMIN — Medication 10 ML: at 14:34

## 2021-03-15 RX ADMIN — PHENYLEPHRINE HYDROCHLORIDE 40 MCG: 10 INJECTION INTRAVENOUS at 16:28

## 2021-03-15 RX ADMIN — SODIUM CHLORIDE 40 MG: 9 INJECTION INTRAMUSCULAR; INTRAVENOUS; SUBCUTANEOUS at 01:13

## 2021-03-15 RX ADMIN — PHENYLEPHRINE HYDROCHLORIDE 40 MCG: 10 INJECTION INTRAVENOUS at 16:33

## 2021-03-15 RX ADMIN — Medication 10 ML: at 01:21

## 2021-03-15 RX ADMIN — PHENYLEPHRINE HYDROCHLORIDE 40 MCG: 10 INJECTION INTRAVENOUS at 16:27

## 2021-03-15 RX ADMIN — ROSUVASTATIN 10 MG: 10 TABLET, FILM COATED ORAL at 21:01

## 2021-03-15 RX ADMIN — MAGNESIUM SULFATE HEPTAHYDRATE 2 G: 40 INJECTION, SOLUTION INTRAVENOUS at 01:17

## 2021-03-15 RX ADMIN — PROPOFOL 50 MG: 10 INJECTION, EMULSION INTRAVENOUS at 16:22

## 2021-03-15 RX ADMIN — SODIUM CHLORIDE 40 MG: 9 INJECTION INTRAMUSCULAR; INTRAVENOUS; SUBCUTANEOUS at 20:47

## 2021-03-15 RX ADMIN — PROPOFOL 25 MG: 10 INJECTION, EMULSION INTRAVENOUS at 16:25

## 2021-03-15 RX ADMIN — AMIODARONE HYDROCHLORIDE 200 MG: 200 TABLET ORAL at 09:36

## 2021-03-15 RX ADMIN — SODIUM CHLORIDE 1000 ML: 9 INJECTION, SOLUTION INTRAVENOUS at 01:11

## 2021-03-15 RX ADMIN — PHENYLEPHRINE HYDROCHLORIDE 40 MCG: 10 INJECTION INTRAVENOUS at 16:21

## 2021-03-15 NOTE — PROGRESS NOTES
Spiritual Care Assessment/Progress Note  [facility]      NAME: Tana Knowles      MRN: [id number]  AGE: 70 y.o.  SEX: male  Rastafari Affiliation: Mosque   Language: English     3/15/2021           Spiritual Assessment begun in [facility] CV INTNSV [facility] through conversation with:         [x]Patient        [] Family    [] Friend(s)        Reason for Consult: Initial/Spiritual assessment, critical care     Spiritual beliefs: (Please include comment if needed)     [x] Identifies with a hadley tradition:  Mosque       [] Supported by a hadley community:            [] Claims no spiritual orientation:           [] Seeking spiritual identity:                [] Adheres to an individual form of spirituality:           [] Not able to assess:                           Identified resources for coping:      [x] Prayer                               [] Music                  [] Guided Imagery     [] Family/friends                 [] Pet visits     [] Devotional reading                         [] Unknown     [] Other:                                              Interventions offered during this visit: (See comments for more details)    Patient Interventions: Affirmation of emotions/emotional suffering, Catharsis/review of pertinent events in supportive environment, Initial/Spiritual assessment, Critical care, Prayer (assurance of)           Plan of Care:     [x] Support spiritual and/or cultural needs    [] Support AMD and/or advance care planning process      [] Support grieving process   [] Coordinate Rites and/or Rituals    [] Coordination with community clergy   [] No spiritual needs identified at this time   [] Detailed Plan of Care below (See Comments)  [] Make referral to Music Therapy  [] Make referral to Pet Therapy     [] Make referral to Addiction services  [] Make referral to [facility]  [] Make referral to Spiritual Care Partner  [] No future visits requested        [x] Follow up upon further referrals Comments: Visited Mr Tasha Angeles in [place] for initial spiritual assessment. Mr aTsha Angeles was lying quietly in bed and appeared in good spirits. Provided pastoral presence and active listening as he shared that he was doing well. Said he had a procedure scheduled for later today but had no concerns about it. He shared that he was Taoism and expressed appreciation for  visit. Assured him of prayers on his behalf and of ongoing  availability for support. : Rev. Clair Connor.  Derick Hayes; [facility], to contact [phone] [address] [facility] call: [phone]

## 2021-03-15 NOTE — ROUTINE PROCESS
TRANSFER - OUT REPORT: 
 
Verbal report given to RACHELE Reddy(name) on Corky Tom  being transferred to [facility] [phone] (CVICU)(unit) for routine progression of care Report consisted of patients Situation, Background, Assessment and  
Recommendations(SBAR). Information from the following report(s) SBAR, Kardex, ED Summary, [facility] and Recent Results was reviewed with the receiving nurse. Lines:  
Peripheral IV 03/14/21 Left Forearm (Active) Site Assessment Clean, dry, & intact 03/14/21 2135 Phlebitis Assessment 0 03/14/21 2135 Infiltration Assessment 0 03/14/21 2135 Dressing Status Clean, dry, & intact 03/14/21 2135 Dressing Type Transparent 03/14/21 2135 Hub Color/Line Status Pink 03/14/21 2135 Action Taken Blood drawn 03/14/21 2135 Peripheral IV 03/15/21 Right; Anterior; Upper Arm (Active) Site Assessment Clean, dry, & intact 03/15/21 0001 Phlebitis Assessment 0 03/15/21 0001 Infiltration Assessment 0 03/15/21 0001 Dressing Status Clean, dry, & intact 03/15/21 0001 Dressing Type Trach dressing 03/15/21 0001 Hub Color/Line Status Green;Capped;Flushed;Patent 03/15/21 0001 Opportunity for questions and clarification was provided. Patient transported with: 
 Monitor Registered Nurse

## 2021-03-15 NOTE — PROGRESS NOTES
S Cardiology Progress Note    Date of Service: 3/15/2021    Subjective:  No acute events overnight. No CP, feels markedly improved after blood transfusion. Hgb up to 6.0 from 4.2. Hemoccult positive. Says stools look the same as they always have and denies salud melena or hematochezia. NPO for EGD today.     Labs from recent AF ablation at [facility].                 Objective:    Visit Vitals  /64 (BP 1 Location: Left upper arm, BP Patient Position: At rest)   Pulse 82   Temp 98.3 °F (36.8 °C)   Resp 18   Wt 80.6 kg (177 lb 11.1 oz)   SpO2 96%   BMI 24.10 kg/m²         Intake/Output Summary (Last 24 hours) at 3/15/2021 1539  Last data filed at 3/15/2021 1126  Gross per 24 hour   Intake 1912.1 ml   Output 725 ml   Net 1187.1 ml        Physical Exam  GEN: NAD, appears stated age  HEENT: EOMI, MMM, OP clear  NECK: Normal JVP  CV: RRR, normal S1, soft S2, harsh III/VI late-peaking systolic murmur at LUSB, no rubs or gallops  LUNGS: CTAB, no W/R/R  ABD: NABS, soft, NT/ND  EXT: No edema, 2+ distal pulses  PSYCH: Mood and affect normal  NEURO: AAO, MAEW, face symmetrical, speech intact    Current Facility-Administered Medications   Medication Dose Route Frequency    0.9% sodium chloride infusion 250 mL  250 mL IntraVENous PRN    0.9% sodium chloride infusion 250 mL  250 mL IntraVENous PRN    amiodarone (CORDARONE) tablet 200 mg  200 mg Oral DAILY    rosuvastatin (CRESTOR) tablet 10 mg  10 mg Oral QHS    sodium chloride (NS) flush 5-40 mL  5-40 mL IntraVENous Q8H    sodium chloride (NS) flush 5-40 mL  5-40 mL IntraVENous PRN    pantoprazole (PROTONIX) 40 mg in 0.9% sodium chloride 10 mL injection  40 mg IntraVENous Q12H       Data Reviewed:  Recent Labs     03/15/21  0940 03/14/21  1825    136   K 3.8 4.2   * 109*   CO2 22 20*   * 193*   BUN 37* 44*   CREA 0.91 1.16   CA 8.1* 8.9   MG  --  2.1   ALB 2.8* 3.2*   ALT 18 23   INR 1.2*  --      Recent Labs     03/15/21  0940 03/14/21  1828 WBC 4.1 5.5   HGB 6.0* 4.2*   HCT 19.1* 14.0*   PLT 94* 130*     Lab Results   Component Value Date/Time    Specimen Description: NARES 07/18/2009 08:10 PM     Lab Results   Component Value Date/Time    Culture result:  07/18/2009 08:10 PM     MRSA NOT PRESENT      Screening of patient nares for MRSA is for surveillance purposes and, if positive, to facilitate isolation considerations in high risk settings. It is not intended for automatic decolonization interventions per se as regimens are not sufficiently effective to warrant routine use. All Cardiac Markers in the last 24 hours:    Lab Results   Component Value Date/Time    TROIQ <0.05 03/14/2021 06:25 PM       Telemetry (personally reviewed): normal sinus rhythm    Assessment:  1. 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 NAFLD; TAVR CT with findings suggestive of possible cirrhosis, however, RUQ U/S shows no evidence of cirrhosis  3. Symptomatic, severe AS  4. HTN  5. AF/AFL s/p recent ablation by Dr. Lara Found:  - GI to do EGD today  - Agree with transfusion; goal Hgb > 8 given symptomatic severe aortic stenosis  - Continue amiodarone  - Agree with PPI; will defer potential need for oral PPI to GI  - Continue rosuvastatin  - If becomes hypertensive, can restart outpatient lisinopril at lower dose; hold for now given normal / at times low BP  - Hold aspirin and rivaroxaban; discussed with Dr. Pola Anna and will put on ONLY apixaban 2.5 mg q12h once anemia is stable  - Continue plans for possible TAVR next Wednesday, 3/24/21    Thank you for the opportunity to participate in the care of José Miguel Perez and please do not hesitate to contact us should you have any questions. Signed:  Brian Gomez, [address] / [address] [place] [address] [facility]  03/15/21

## 2021-03-15 NOTE — PERIOP NOTES
TRANSFER - OUT REPORT:    Verbal report given to Tanimiladys on Beatris April  being transferred to [facility](unit) for routine progression of care       Report consisted of patients Situation, Background, Assessment and   Recommendations(SBAR). Information from the following report(s) SBAR was reviewed with the receiving nurse. Lines:   Peripheral IV 03/14/21 Left Forearm (Active)   Site Assessment Other (Comment) 03/15/21 0900   Phlebitis Assessment 0 03/15/21 0800   Infiltration Assessment 0 03/15/21 0800   Dressing Status Clean, dry, & intact 03/15/21 0800   Dressing Type Transparent 03/15/21 0800   Hub Color/Line Status Pink;Capped;Flushed 03/15/21 0800   Action Taken Open ports on tubing capped 03/15/21 0800   Alcohol Cap Used Yes 03/15/21 0800       Peripheral IV 03/15/21 Right; Anterior; Upper Arm (Active)   Site Assessment Other (Comment) 03/15/21 0800   Phlebitis Assessment 0 03/15/21 0800   Infiltration Assessment 0 03/15/21 0800   Dressing Status Clean, dry, & intact 03/15/21 0800   Dressing Type Transparent;Tape 03/15/21 0800   Hub Color/Line Status Green;Flushed;Patent;Capped 03/15/21 0800   Action Taken Open ports on tubing capped 03/15/21 0800   Alcohol Cap Used Yes 03/15/21 0800        Opportunity for questions and clarification was provided.       Patient transported with:   Bridg

## 2021-03-15 NOTE — CONSULTS
[address]    Name:  Morris Quintanilla  MR#:  [id number]  :  1945  ACCOUNT #:  [de-identified]  DATE OF SERVICE:  2021    HISTORY OF PRESENT ILLNESS:  The patient came to the emergency room with worsening symptoms of black stools. He was found to have a hemoglobin of 4.2. He has been feeling poorly for several days. He has known severe aortic stenosis and is being prepped for TAVR. He had atrial flutter and atrial fibrillation ablation a few weeks ago. He has been on Xarelto daily since then and has not noticed any red blood or any black stools until recently. He denies any abdominal pain, chest pain. He does have profound weakness and fatigue, and can only stand for a few seconds before he must lie back down. Other medical problems include diabetes type 2, hypertension, hyperlipidemia, and persistent atrial fibrillation CHADSVASC 3, now status post atrial flutter and atrial fibrillation ablation. He had a coronary angiogram as part of the workup for his TAVR, and was found to have moderate nonocclusive coronary disease, normal ejection fraction. REVIEW OF SYSTEMS:  Otherwise unremarkable. PHYSICAL EXAMINATION:  GENERAL:  This is a well-developed, alert, calm gentleman, in no distress. VITAL SIGNS:  As follows:  His blood pressure is 116/57, sats 91% on room air, heart rate is 98, respirations 16. HEENT:  Unremarkable. NECK:  Supple. No adenopathy. CHEST WALL:  Nontender. LUNGS:  Clear. HEART:  Harsh systolic murmur, typical for aortic stenosis. No S3 gallop or friction rub. No thrills, lifts, or heaves. ABDOMEN:  Soft and nontender. EXTREMITIES:  No edema. Normal pedal pulses. NEUROLOGIC:  The patient is awake, alert, and appropriate. Normal speech. No focal motor signs detected. LABORATORY DATA:  In addition to the hemoglobin of 4.2, white count is normal, platelet count is 916,889. Chemistries:  BUN 44, creatinine 1. 16. Potassium 4.2. Troponin less than 0.05. An EKG was done, which demonstrates sinus rhythm, 99 beats a minute, prolonged QT interval at 482 milliseconds. Nondiagnostic ST-T changes. IMPRESSION:  1. Acute gastrointestinal blood loss. 2.  His severe aortic stenosis is stable. Chest x-ray is clear. No signs or symptoms of heart failure. 3.  Recent atrial fibrillation and atrial flutter ablation. RECOMMENDATIONS:  Agree with transfusion, 2 units of packed cells. Would use furosemide 40 mg IV in between those 2 units, and check a magnesium level to make sure it is not the reason for his QT prolongation. We will sign this out to Dr. Karen Can who will see this patient tomorrow.   Thank you for this referral.      Mallorie Funez MD      [id number]  D:  03/14/2021 21:40  T:  03/15/2021 2:12  JOB #:  [id number]

## 2021-03-15 NOTE — PROCEDURES
[address]  [address] (EGD) Procedure Note    Kishore Metcalf  1945  [id number]      Procedure: Endoscopic Gastroduodenoscopy --diagnostic    Indication: melena, anemia, Xarelto anticoagulation    Pre-operative Diagnosis: see indication above    Post-operative Diagnosis: see findings below    : Sharon Morse. Christy Schwartz MD    Referring Provider:  Jade Nguyen MD      Anesthesia/Sedation:  MAC anesthesia Propofol        Procedure Details     After informed consent was obtained for the procedure, with all risks and benefits of procedure explained the patient was taken to the endoscopy suite and placed in the left lateral decubitus position. Following sequential administration of sedation as per above, the endoscope was inserted into the mouth and advanced under direct vision to second portion of the duodenum. A careful inspection was made as the gastroscope was withdrawn, including a retroflexed view of the proximal stomach; findings and interventions are described below. Findings:   Esophagus:normal  Stomach: mild patchy erythema in the antrum  Duodenum: mild patchy erythema in the bulb    No active bleeding. No high risk stigmata. No old blood seen. Therapies:  none    Specimens: none         EBL: None      Complications:   None; patient tolerated the procedure well. Impression:    As above    Recommendations:  1. Clear liquid diet  2. NPO after midnight for colonoscopy tomorrow  3. PEG 3350 4L preparation to begin tonight    Signed By: Sharon Morse.  Christy Schwartz MD     3/15/2021  4:28 PM

## 2021-03-15 NOTE — PROGRESS NOTES
0600: TRANSFER - IN REPORT:  Verbal report received from RACHELE Christensen(name) on Jacobo Smith  being received from ED(unit) for routine progression of care    Report consisted of patients Situation, Background, Assessment and   Recommendations(SBAR). Information from the following report(s) SBAR, Kardex, ED Summary, Intake/Output, MAR, Accordion, Recent Results, Med Rec Status and Cardiac Rhythm NSR was reviewed with the receiving nurse. Opportunity for questions and clarification was provided. Assessment completed upon patients arrival to unit and care assumed. 0630: Pt arrived on unit, placed on monitor. Dual skin completed with Ar English RN - Ulcer (not pressure) noted R big toe    0745: Bedside shift change report given to Elsy Cowan (oncoming nurse) by RACHELE FLOYD (offgoing nurse). Report included the following information SBAR, Kardex, Procedure Summary, Intake/Output, MAR, Accordion, Recent Results, Med Rec Status and Cardiac Rhythm NSR.

## 2021-03-15 NOTE — ROUTINE PROCESS
Jasper Julian 1945 
[id number] Situation: 
Verbal report received from: JOSE ANTONIO Elkins Procedure: Procedure(s): ESOPHAGOGASTRODUODENOSCOPY (EGD) Background: 
 
Preoperative diagnosis: GI Bleed Postoperative diagnosis: Anemia Linn April :  Dr. Jose Roberto Tsang Assistant(s): Endoscopy Technician-1: Kyle Rodríguez IV 
Endoscopy RN-1: Garr Riedel, RN Specimens: * No specimens in log * H. Pylori  no Assessment: 
Intra-procedure medications Anesthesia gave intra-procedure sedation and medications, see anesthesia flow sheet yes Intravenous fluids: [facility] [place] Vital signs stable Abdominal assessment: round and soft Recommendation: 
 
Return to floor

## 2021-03-15 NOTE — PROGRESS NOTES
Patient admitted earlier this AM by night team. Refer to H&P. Pt seen and examined. S/p 2 PRBCs (did not get lasix in between). He is feeling better today, no dizziness/lightheadedness. Repeat CBC today is pending. Unknown baseline hgb. Appreciate cardiology. Mag ok. No clear evidence of active/acute GI bleeding as pt reports last BM yesterday was brown in color.     Edward Abel MD

## 2021-03-15 NOTE — ANESTHESIA POSTPROCEDURE EVALUATION
Procedure(s):  ESOPHAGOGASTRODUODENOSCOPY (EGD). MAC    Anesthesia Post Evaluation      Multimodal analgesia: multimodal analgesia not used between 6 hours prior to anesthesia start to PACU discharge  Patient location during evaluation: PACU  Patient participation: complete - patient participated  Level of consciousness: awake  Pain score: 0  Pain management: adequate  Airway patency: patent  Anesthetic complications: no  Cardiovascular status: acceptable  Respiratory status: acceptable  Hydration status: acceptable  Comments: I have evaluated the patient and meets criteria for discharge from PACU. Claudell Hillier., MD    Final Post Anesthesia Temperature Assessment:  Normothermia (36.0-37.5 degrees C)      INITIAL Post-op Vital signs:   Vitals Value Taken Time   /62 03/15/21 1645   Temp 36.7 °C (98 °F) 03/15/21 1643   Pulse 77 03/15/21 1647   Resp 26 03/15/21 1647   SpO2 99 % 03/15/21 1647   Vitals shown include unvalidated device data.

## 2021-03-15 NOTE — CONSULTS
[address] [facility].   [address] [facility] [address]        GASTROENTEROLOGY CONSULTATION NOTE  Will Elvin Thurman  [phone] office  [phone] NP/PA in-hospital cell phone M-F until 4:30PM  After 5PM or on weekends, please call  for physician on call        NAME:  Willian Cornejo   :   1945   MRN:   [id number]       Referring Physician: Dr. Beatriz Goldmann Date: 3/15/2021 9:01 AM    Chief Complaint: fatigue     History of Present Illness:  Patient is a 70 y.o. who is seen in consultation at the request of Dr. Armin Raymundo for GI bleed. Patient has a past medical history significant severe aortic stenosis and TAVR is planned. Patient had atrial flutter and atrial fibrillation for which he underwent an ablation a few weeks ago and was placed on Xarelto. Patient presented to the ED for weakness and fatigue. He was found to have a hemoglobin of 4.2. Patient was admitted to the hospital today, 3/15/21. Patient reports fatigue and weakness for the last approximately 3 days that progressively worsened. No nausea, reflux, or vomiting. No dysphagia or odynophagia. No abdominal pain or abdominal bloating. No change in bowel habits, melena, or hematochezia. No fevers or chills. No history of GI bleed.    + NSAID use (2 Advil most days). Patient was started on Xarelto 2-3 weeks ago following cardiac ablation. Last dose of Xarelto was on Saturday.  + Alcohol use (2-3 drinks most days). No tobacco use. No history of abdominal surgeries. No history of EGD or colonoscopy. Patient reports having a recent virtual colonoscopy with reported 2 cm lesion near the rectum. He is scheduled for a follow up appointment with GI at [facility] in April. I have reviewed the emergency room note, hospital admission note, notes by all other clinicians who have seen the patient during this hospitalization to date. I have reviewed the problem list and the reason for this hospitalization.  I have reviewed the allergies and the medications the patient was taking at home prior to this hospitalization. PMH:  No past medical history on file. PSH:  No past surgical history on file. Allergies:  No Known Allergies    Home Medications:  Prior to Admission Medications   Prescriptions Last Dose Informant Patient Reported? Taking?   amiodarone (CORDARONE) 200 mg tablet 3/14/2021 at Unknown time  Yes Yes   Sig: Take 200 mg by mouth. aspirin delayed-release 81 mg tablet 3/14/2021 at Unknown time  No Yes   Sig: Take 1 Tab by mouth daily. Indications: mild coronary artery disease   lisinopriL (PRINIVIL, ZESTRIL) 40 mg tablet 3/8/2021 at Unknown time  Yes Yes   Sig: Take 40 mg by mouth daily. Only took 10 mg today pt stated due to his B/P   metFORMIN (GLUCOPHAGE) 500 mg tablet 3/14/2021 at Unknown time  Yes Yes   Sig: Take 500 mg by mouth daily (with breakfast). multivitamin (ONE A DAY) tablet 3/14/2021 at Unknown time  Yes Yes   Sig: Take 1 Tab by mouth daily. pantoprazole (PROTONIX) 40 mg tablet Not Taking at Unknown time  Yes No   Sig: Take 40 mg by mouth daily. rivaroxaban (Xarelto) 20 mg tab tablet 3/8/2021 at Unknown time  Yes Yes   Sig: Take 20 mg by mouth daily (with dinner). rosuvastatin (CRESTOR) 10 mg tablet Not Taking at Unknown time  No No   Sig: Take 1 Tab by mouth nightly.  Indications: hardening of the arteries due to plaque buildup      Facility-Administered Medications: None       Hospital Medications:  Current Facility-Administered Medications   Medication Dose Route Frequency    0.9% sodium chloride infusion 250 mL  250 mL IntraVENous PRN    amiodarone (CORDARONE) tablet 200 mg  200 mg Oral DAILY    rosuvastatin (CRESTOR) tablet 10 mg  10 mg Oral QHS    sodium chloride (NS) flush 5-40 mL  5-40 mL IntraVENous Q8H    sodium chloride (NS) flush 5-40 mL  5-40 mL IntraVENous PRN    pantoprazole (PROTONIX) 40 mg in 0.9% sodium chloride 10 mL injection  40 mg IntraVENous Q12H Social History:  Social History     Tobacco Use    Smoking status: Not on file   Substance Use Topics    Alcohol use: Not on file       Family History:  No family history on file.     Review of Systems:  Constitutional: negative fever, negative chills, negative weight loss  Eyes:   negative visual changes  ENT:   negative sore throat, tongue or lip swelling  Respiratory:  negative cough, negative dyspnea  Cards:  negative for chest pain, palpitations, lower extremity edema  GI:   See HPI  :  negative for frequency, dysuria  Integument:  negative for rash and pruritus  Heme:  negative for easy bruising and gum/nose bleeding  Musculoskeletal:negative for myalgias, back pain and muscle weakness  Neuro:    negative for headaches, dizziness  Psych: negative for feelings of anxiety, depression       Objective:     Patient Vitals for the past 8 hrs:   BP Temp Pulse Resp SpO2 Weight   03/15/21 0700 122/64  85 14 100 %    03/15/21 0626 134/68 98.6 °F (37 °C) 94 13 100 % 80.6 kg (177 lb 11.1 oz)   03/15/21 0600 (!) 109/56  79 17 100 %    03/15/21 0530 115/60 98.7 °F (37.1 °C) 79 15 100 %    03/15/21 0515 107/62 98.8 °F (37.1 °C) 79 21 100 %    03/15/21 0500 115/64 98.7 °F (37.1 °C) 88 22 100 %    03/15/21 0445 (!) 106/58 98.7 °F (37.1 °C) 81 22 96 %    03/15/21 0430 123/69 98.7 °F (37.1 °C) 93 22 99 %    03/15/21 0415 (!) 104/54 98.7 °F (37.1 °C) 84 23 97 %    03/15/21 0400 (!) 116/59 98.7 °F (37.1 °C) 83 17 99 %    03/15/21 0345 (!) 97/45 98.7 °F (37.1 °C) 91 20 98 %    03/15/21 0342 (!) 95/49 98.7 °F (37.1 °C) 97 25 100 %    03/15/21 0327 108/64 98.7 °F (37.1 °C) 91 23 99 %    03/15/21 0315 113/61  86 16 96 %    03/15/21 0300 (!) 96/54 98.7 °F (37.1 °C) 83 13 99 %    03/15/21 0245 (!) 97/53  84 15 97 %    03/15/21 0230 (!) 109/57  90 24 95 %    03/15/21 0215 103/60  94 12 100 %    03/15/21 0200 103/61 98.7 °F (37.1 °C) 87 16 98 %    03/15/21 0145 121/66  94 20 100 %    03/15/21 0130 (!) 132/58  (!) 109 29 100 %    03/15/21 0115 (!) 119/56  90 13 100 %      No intake/output data recorded. 03/13 1901 - 03/15 0700  In: 1912.1 [I.V.:1050]  Out: 250 [Urine:250]    EXAM:     CONST:  Pleasant male lying in bed, no acute distress   NEURO:  Alert and oriented x 3   HEENT: EOMI, no scleral icterus   LUNGS: No respiratory distress   CARD:  S1 S2   ABD:  Soft, non distended, no tenderness, no rebound, no guarding. + Bowel sounds. EXT:  Warm   PSYCH: Full, not anxious or agitated       Data Review     Recent Labs     03/14/21 1825   WBC 5.5   HGB 4.2*   HCT 14.0*   *     Recent Labs     03/14/21 1825      K 4.2   *   CO2 20*   BUN 44*   CREA 1.16   *   CA 8.9     Recent Labs     03/14/21 1825   AP 69   TP 6.6   ALB 3.2*   GLOB 3.4     No results for input(s): INR, PTP, APTT, INREXT in the last 72 hours. Assessment:   · Anemia with hemoccult positive stool: Hgb 4.2, platelets 340, BUN 44. On Xarelto, started a few weeks ago. + NSAID use. Received 2 units PRBCs. No signs of active GI bleeding. No history of GI bleed. No history of EGD or colonoscopy. Virtual colonoscopy with reported 2 cm lesion near the rectum. Differential includes peptic ulcer disease, AVMs, and malignancy. · Severe aortic stenosis  · Recent ablation for atrial fibrillation and atrial flutter: on Xarelto prior to admission (last dose 3/13)     Patient Active Problem List   Diagnosis Code    GIB (gastrointestinal bleeding) K92.2     Plan:     · NPO  · PPI BID  · Trend CBC and transfuse as necessary  · Xarelto on hold  · Plan for EGD today with Dr. Ese Davies if Hgb improves. Details and risks of the procedure to include (but not limited to) anesthesia, bleeding, infection, and perforation were discussed. Patient understands and is in agreement with the plan. Discussed with RN.    · Will obtain rapid COVID test  · Patient was discussed with and will be seen by Dr. Ese Davies  · Thank you for allowing me to participate in care of Ashley Mullen     Signed By: Sean Shelton     3/15/2021  9:01 AM       Gastroenterology Attending Physician attestation statement and comments. This patient was seen and examined by me in a face-to-face visit today. I reviewed the medical record including lab work, imaging and other provider notes. I confirmed the history as described above. I spoke to the patient, reviewed the medical record including lab work, imaging and other provider notes. I discussed this case in detail with Sean Gutierrez. I formulated an  assessment of this patient and developed a treatment plan. I agree with the above consultation note. I agree with the history, exam and assessment and plan as outlined in the note. I would like to add the following: Patient seen and examined. Hgb up to 6.0. Will proceed with EGD for melena, anemia in setting of NSAID use and alcohol use. He has GI follow up at [facility] already for rectal lesion. Migue Gordon MD

## 2021-03-15 NOTE — H&P
Hospitalist Admission Note    NAME: Ruth Ann Jamison   :  1945   MRN:  [id number]     Date/Time:  3/14/2021 9:00 PM    Patient PCP: Ryley Silva MD  ______________________________________________________________________  Given the patient's current clinical presentation, I have a high level of concern for decompensation if discharged from the emergency department. Complex decision making was performed, which includes reviewing the patient's available past medical records, laboratory results, and x-ray films. My assessment of this patient's clinical condition and my plan of care is as follows. Assessment / Plan:  Patient 68-year-old male admitted for dyspnea on exertion  1. Dyspnea on exertion; acute anemia due to GI bleeding suspected; initial hemoglobin 4.2; ordered 2 unit of PRBC. Monitor hemoglobin every 8 hours. GI consulted. 2.  History of atrial fibrillation; status post ablation. Hold Xarelto continue amiodarone  3. Aortic stenosis; status post cardiac catheterization 3/9/2021. Plan for TAVR on 3/24. Will consult cardiology  4. History of 2 cm colonic lesion; will consult GI, patient had appointment for follow-up colonoscopy with GI at [facility] in April. 2 cm lesion seen on virtual colonoscopy        Code Status: Full code  Surrogate Decision Maker:    DVT Prophylaxis: SCDs  GI Prophylaxis: not indicated    Baseline: Independent      Subjective:   CHIEF COMPLAINT: Weakness    HISTORY OF PRESENT ILLNESS:     Ruth Ann Jamison is a 70 y.o.  male who presents with above symptoms. Patient history of atrial fibrillation/flutter/status post ablation. He had cardiac catheterization/2021 in preparation for aortic stenosis repair, reports worsening dyspnea on exertion without chest pain for last few days. Progressively worse severe at the time of examination, denies fever chills nausea vomiting. Reports his his stool has been normal color. No abdominal pain.   His lisinopril been on hold due to blood pressure being better controlled. He had a Cologuard test, positive. Had a virtual colonoscopy at [facility]. He was told a 2 cm lesion in the colon. There is appointment made for the GI follow-up in April. He has his COVID-19 immunization. Denies cough fever chills    ER course; upon arrival basic labs shows hemoglobin 4.3. Started 2 unit of PRBC. Cardiologist and GI consulted. Hospitalist will admit  We were asked to admit for work up and evaluation of the above problems. No past medical history on file. No past surgical history on file. Social History     Tobacco Use    Smoking status: Not on file   Substance Use Topics    Alcohol use: Not on file        No family history on file. No Known Allergies     Prior to Admission medications    Medication Sig Start Date End Date Taking? Authorizing Provider   amiodarone (CORDARONE) 200 mg tablet Take 200 mg by mouth. Provider, Historical   lisinopriL (PRINIVIL, ZESTRIL) 40 mg tablet Take 40 mg by mouth daily. Only took 10 mg today pt stated due to his B/P    Provider, Historical   metFORMIN (GLUCOPHAGE) 500 mg tablet Take 500 mg by mouth daily (with breakfast). Provider, Historical   multivitamin (ONE A DAY) tablet Take 1 Tab by mouth daily. Provider, Historical   pantoprazole (PROTONIX) 40 mg tablet Take 40 mg by mouth daily. Provider, Historical   rivaroxaban (Xarelto) 20 mg tab tablet Take 20 mg by mouth daily (with dinner). Provider, Historical   aspirin delayed-release 81 mg tablet Take 1 Tab by mouth daily. Indications: mild coronary artery disease 3/9/21   Charly Sanchez MD   rosuvastatin (CRESTOR) 10 mg tablet Take 1 Tab by mouth nightly. Indications: hardening of the arteries due to plaque buildup 3/9/21   Charly Sanchez MD       REVIEW OF SYSTEMS:     I am not able to complete the review of systems because:    The patient is intubated and sedated    The patient has altered mental status due to his acute medical problems    The patient has baseline aphasia from prior stroke(s)    The patient has baseline dementia and is not reliable historian    The patient is in acute medical distress and unable to provide information           Total of 12 systems reviewed as follows:       POSITIVE= underlined text  Negative = text not underlined  General:  fever, chills, sweats, generalized weakness, weight loss/gain,      loss of appetite   Eyes:    blurred vision, eye pain, loss of vision, double vision  ENT:    rhinorrhea, pharyngitis   Respiratory:   cough, sputum production, SOB, MANCIA, wheezing, pleuritic pain   Cardiology:   chest pain, palpitations, orthopnea, PND, edema, syncope   Gastrointestinal:  abdominal pain , N/V, diarrhea, dysphagia, constipation, bleeding   Genitourinary:  frequency, urgency, dysuria, hematuria, incontinence   Muskuloskeletal :  arthralgia, myalgia, back pain  Hematology:  easy bruising, nose or gum bleeding, lymphadenopathy   Dermatological: rash, ulceration, pruritis, color change / jaundice  Endocrine:   hot flashes or polydipsia   Neurological:  headache, dizziness, confusion, focal weakness, paresthesia,     Speech difficulties, memory loss, gait difficulty  Psychological: Feelings of anxiety, depression, agitation    Objective:   VITALS:    Visit Vitals  BP (!) 116/57   Pulse 98   Temp 98.8 °F (37.1 °C)   Resp 16   SpO2 91%       PHYSICAL EXAM:    General:    Alert, cooperative, no distress, appears stated age. HEENT: Atraumatic, anicteric sclerae, pale conjunctiva     No oral ulcers, mucosa moist, throat clear, dentition fair  Neck:  Supple, symmetrical,  thyroid: non tender  Lungs:   Clear to auscultation bilaterally. No Wheezing or Rhonchi. No rales. Chest wall:  No tenderness  No Accessory muscle use. Heart:   Regular  rhythm, systolic murmur, second intercostal space right more severe. no edema  Abdomen:   Soft, non-tender. Not distended.   Bowel sounds normal  Extremities: No cyanosis. No clubbing,      Skin turgor normal, Capillary refill normal, Radial dial pulse 2+  Skin:     Not pale. Not Jaundiced  No rashes   Psych:  Good insight. Not depressed. Not anxious or agitated. Neurologic: EOMs intact. No facial asymmetry. No aphasia or slurred speech. Symmetrical strength, Sensation grossly intact. Alert and oriented X 4.     _______________________________________________________________________  Care Plan discussed with:    Comments   Patient     Family      RN     Care Manager                    Consultant:      _______________________________________________________________________  Expected  Disposition:   Home with Family    HH/PT/OT/RN    SNF/LTC    [facility]    ________________________________________________________________________  TOTAL TIME: 45Minutes    Critical Care Provided     Minutes non procedure based      Comments     Reviewed previous records   >50% of visit spent in counseling and coordination of care  Discussion with patient and/or family and questions answered       ________________________________________________________________________  Signed: Isa Maya MD    Procedures: see electronic medical records for all procedures/Xrays and details which were not copied into this note but were reviewed prior to creation of Plan.     LAB DATA REVIEWED:    Recent Results (from the past 24 hour(s))   EKG, 12 LEAD, INITIAL    Collection Time: 03/14/21  6:14 PM   Result Value Ref Range    Ventricular Rate 99 BPM    Atrial Rate 99 BPM    P-R Interval 176 ms    QRS Duration 88 ms    Q-T Interval 376 ms    QTC Calculation (Bezet) 482 ms    Calculated P Axis 61 degrees    Calculated R Axis 58 degrees    Calculated T Axis 73 degrees    Diagnosis       Normal sinus rhythm  Left ventricular hypertrophy with repolarization abnormality  Prolonged QT  No previous ECGs available     TYPE & SCREEN    Collection Time: 03/14/21  6:24 PM   Result Value Ref Range Crossmatch Expiration 03/17/2021,2359     ABO/Rh(D) O POSITIVE     Antibody screen NEG     Unit number [id number]     Blood component type [facility]     Unit division 00     Status of unit ALLOCATED     Crossmatch result Compatible     Unit number [id number]     Blood component type [facility]     Unit division 00     Status of unit ALLOCATED     Crossmatch result Compatible    CBC WITH AUTOMATED DIFF    Collection Time: 03/14/21  6:25 PM   Result Value Ref Range    WBC 5.5 4.1 - 11.1 K/uL    RBC 1.58 (L) 4.10 - 5.70 M/uL    HGB 4.2 (LL) 12.1 - 17.0 g/dL    HCT 14.0 (LL) 36.6 - 50.3 %    MCV 88.6 80.0 - 99.0 FL    MCH 26.6 26.0 - 34.0 PG    MCHC 30.0 30.0 - 36.5 g/dL    RDW 14.8 (H) 11.5 - 14.5 %    PLATELET 210 (L) 054 - 400 K/uL    MPV 9.8 8.9 - 12.9 FL    NRBC 0.4 (H) 0  WBC    ABSOLUTE NRBC 0.02 (H) 0.00 - 0.01 K/uL    NEUTROPHILS 77 (H) 32 - 75 %    LYMPHOCYTES 14 12 - 49 %    MONOCYTES 9 5 - 13 %    EOSINOPHILS 0 0 - 7 %    BASOPHILS 0 0 - 1 %    IMMATURE GRANULOCYTES 0 0.0 - 0.5 %    ABS. NEUTROPHILS 4.2 1.8 - 8.0 K/UL    ABS. LYMPHOCYTES 0.8 0.8 - 3.5 K/UL    ABS. MONOCYTES 0.5 0.0 - 1.0 K/UL    ABS. EOSINOPHILS 0.0 0.0 - 0.4 K/UL    ABS. BASOPHILS 0.0 0.0 - 0.1 K/UL    ABS. IMM.  GRANS. 0.0 0.00 - 0.04 K/UL    DF SMEAR SCANNED      RBC COMMENTS HYPOCHROMIA  1+        RBC COMMENTS MICROCYTOSIS  2+        WBC COMMENTS Pathology Review Requested     METABOLIC PANEL, COMPREHENSIVE    Collection Time: 03/14/21  6:25 PM   Result Value Ref Range    Sodium 136 136 - 145 mmol/L    Potassium 4.2 3.5 - 5.1 mmol/L    Chloride 109 (H) 97 - 108 mmol/L    CO2 20 (L) 21 - 32 mmol/L    Anion gap 7 5 - 15 mmol/L    Glucose 193 (H) 65 - 100 mg/dL    BUN 44 (H) 6 - 20 MG/DL    Creatinine 1.16 0.70 - 1.30 MG/DL    BUN/Creatinine ratio 38 (H) 12 - 20      GFR est AA >60 >60 ml/min/1.73m2    GFR est non-AA >60 >60 ml/min/1.73m2    Calcium 8.9 8.5 - 10.1 MG/DL    Bilirubin, total 0.6 0.2 - 1.0 MG/DL    ALT (SGPT) 23 12 - 78 U/L AST (SGOT) 24 15 - 37 U/L    Alk. phosphatase 69 45 - 117 U/L    Protein, total 6.6 6.4 - 8.2 g/dL    Albumin 3.2 (L) 3.5 - 5.0 g/dL    Globulin 3.4 2.0 - 4.0 g/dL    A-G Ratio 0.9 (L) 1.1 - 2.2     D DIMER    Collection Time: 03/14/21  6:25 PM   Result Value Ref Range    D-dimer 0.19 0.00 - 0.65 mg/L FEU   TROPONIN I    Collection Time: 03/14/21  6:25 PM   Result Value Ref Range    Troponin-I, Qt. <0.05 <0.05 ng/mL   SAMPLES BEING HELD    Collection Time: 03/14/21  6:25 PM   Result Value Ref Range    SAMPLES BEING HELD 1RED     COMMENT        Add-on orders for these samples will be processed based on acceptable specimen integrity and analyte stability, which may vary by analyte. RBC, ALLOCATE    Collection Time: 03/14/21  7:15 PM   Result Value Ref Range    HISTORY CHECKED?  Historical check performed    OCCULT BLOOD, STOOL    Collection Time: 03/14/21  8:30 PM   Result Value Ref Range    Occult blood, stool Positive (A) NEG

## 2021-03-15 NOTE — CONSULTS
Cardiology Note dictated # [id number]  Imp:  Profound blood loss anemia  Known severe aortic stenosis  S/P recent AF and AFL ablation on Xarelto for the past 3-4 weeks  Moderate nonocclusive disease CAD on his recent cath  DM T2  Plans for transfusion of 2 units of PRBCs noted   Recommend furosemide IV x 1 in between units   Dr Joaquim Reece to see tomorrow  Thank you for this referral  Lexi Okeefe MD  Interventional Cardiology  [facility]

## 2021-03-15 NOTE — ANESTHESIA PREPROCEDURE EVALUATION
Relevant Problems   No relevant active problems       Anesthetic History   No history of anesthetic complications            Review of Systems / Medical History  Patient summary reviewed, nursing notes reviewed and pertinent labs reviewed    Pulmonary  Within defined limits                 Neuro/Psych   Within defined limits           Cardiovascular  Within defined limits    Valvular problems/murmurs: aortic stenosis                 GI/Hepatic/Renal  Within defined limits              Endo/Other        Anemia     Other Findings              Physical Exam    Airway  Mallampati: II  TM Distance: > 6 cm  Neck ROM: normal range of motion   Mouth opening: Normal     Cardiovascular  Regular rate and rhythm,  S1 and S2 normal,  no murmur, click, rub, or gallop             Dental  No notable dental hx       Pulmonary  Breath sounds clear to auscultation               Abdominal  GI exam deferred       Other Findings            Anesthetic Plan    ASA: 3, emergent  Anesthesia type: MAC            Anesthetic plan and risks discussed with: Patient

## 2021-03-15 NOTE — PROGRESS NOTES
Reason for Admission: Patient presented with dyspnea on exertion- GI bleed                       RUR Score:  11% risk of re-admission                    Plan for utilizing home health: No home health needs identified at this time          PCP: First and Last name:  Jaky Levi MD     Name of Practice: [facility]   Are you a current patient: Yes/No:  Yes   Approximate date of last visit: Within the past year- saw NP   Can you participate in a virtual visit with your PCP: NP                    Current Advanced Directive/Advance Care Plan: Full Code      Healthcare Decision Maker:   Click here to complete [place] including selection of the Healthcare Decision Maker Relationship (ie \"Primary\") The patient's spouse Cristino Galvez is legal NOK                             Transition of Care Plan: Anticipate discharge home when medically stable             The CM met with the patient at bedside in order to introduce the role of CM and assess for patient needs. The patient lives at home with his wife- verified demographics and insurance information. The patient is independent with ADLs and mobility, denied use of DME in the home. The patient utilizes [facility] on [address] for prescriptions- denied any concerns for transition home, spouse to transport at discharge. CM will continue to follow for transitions of care. HINA Clarke     Care Management Interventions  PCP Verified by CM: Yes(The patient verified PCP as Linh Light )  Mode of Transport at Discharge:  Other (see comment)(Family will transport )  Transition of Care Consult (CM Consult): Discharge Planning  Physical Therapy Consult: No  Occupational Therapy Consult: No  Current Support Network: Own Home, Lives with Spouse  Confirm Follow Up Transport: Family  Discharge Location  Discharge Placement: Home

## 2021-03-15 NOTE — PROGRESS NOTES
[facility] visit. Mr. Melecio Ballard was talkative and pleasant. He reported he is not [employer]. Asked if he would like that changed in his chart. He agreed and his demographic has been changed to [employer]. Mr. Melecio Ballard is waiting for tests and hopes to be discharged soon. Prayer offered.     BLANQUITA Perry, RN, ACSW, LCSW   Page:  [phone]

## 2021-03-15 NOTE — PROGRESS NOTES
0800:  Bedside and Verbal shift change report given to SANDRA RN (oncoming nurse) by Laura Hernandez RN (offgoing nurse). Report included the following information SBAR, Kardex, Intake/Output, MAR, Recent Results and Cardiac Rhythm NSR.    0820:  Consults called for GI and cardiology. 0900:  Patient resting comfortably in bed. NPO, negative assessment. 0930:  GI visited patient, plan for EGD later today, keep NPO.    1000:  Spoke with endoscopy, EGD scheduled for 1500.    1410:  COVID test sent to lab for a rapid. 1420:  Blood started infusing. Would like to infuse 1PRBC before EGD. 1435:  Consent obtained for EGD. 1445:  Spoke with hospitalist Dr. Hayley Campos. Patient may transfer to endoscopy without RN transport. 1645:  Report received from Trice Amaya, [address] [place] [address] in endoscopy. Please see notes. Patient will be on clear liquids for dinner, NPO at midnight for colonoscopy tomorrow. In endoscopy HR 79, 98% RA, 120/72. Received propofol during procedure. 1750:  Patient arrived from endoscopy with transport. Blood has finished infusing. Had a dark, loose BM upon arrival back. VSS.     1800:  H&H drawn. 1830:  Patient is eating a clear liquid diet. 1900:  HGB 7.5.    2000:  Bedside and Verbal shift change report given to Debra Warren RN (oncoming nurse) by Oh Parosn RN (offgoing nurse). Report included the following information SBAR, Kardex, Intake/Output, MAR, Recent Results and Cardiac Rhythm NSR with ST depression.

## 2021-03-16 ENCOUNTER — ANESTHESIA (OUTPATIENT)
Dept: ENDOSCOPY | Age: 76
DRG: 378 | End: 2021-03-16
Payer: COMMERCIAL

## 2021-03-16 ENCOUNTER — ANESTHESIA EVENT (OUTPATIENT)
Dept: ENDOSCOPY | Age: 76
DRG: 378 | End: 2021-03-16
Payer: COMMERCIAL

## 2021-03-16 LAB
ANION GAP SERPL CALC-SCNC: 10 MMOL/L (ref 5–15)
BUN SERPL-MCNC: 31 MG/DL (ref 6–20)
BUN/CREAT SERPL: 34 (ref 12–20)
CALCIUM SERPL-MCNC: 8.3 MG/DL (ref 8.5–10.1)
CHLORIDE SERPL-SCNC: 112 MMOL/L (ref 97–108)
CO2 SERPL-SCNC: 19 MMOL/L (ref 21–32)
CREAT SERPL-MCNC: 0.92 MG/DL (ref 0.7–1.3)
ERYTHROCYTE [DISTWIDTH] IN BLOOD BY AUTOMATED COUNT: 13.8 % (ref 11.5–14.5)
ERYTHROCYTE [DISTWIDTH] IN BLOOD BY AUTOMATED COUNT: 14.1 % (ref 11.5–14.5)
GLUCOSE BLD STRIP.AUTO-MCNC: 111 MG/DL (ref 65–100)
GLUCOSE BLD STRIP.AUTO-MCNC: 122 MG/DL (ref 65–100)
GLUCOSE SERPL-MCNC: 117 MG/DL (ref 65–100)
HCT VFR BLD AUTO: 21.8 % (ref 36.6–50.3)
HCT VFR BLD AUTO: 24.2 % (ref 36.6–50.3)
HGB BLD-MCNC: 7 G/DL (ref 12.1–17)
HGB BLD-MCNC: 7.7 G/DL (ref 12.1–17)
HISTORY CHECKED?,CKHIST: NORMAL
MAGNESIUM SERPL-MCNC: 2.2 MG/DL (ref 1.6–2.4)
MCH RBC QN AUTO: 28.5 PG (ref 26–34)
MCH RBC QN AUTO: 28.8 PG (ref 26–34)
MCHC RBC AUTO-ENTMCNC: 31.8 G/DL (ref 30–36.5)
MCHC RBC AUTO-ENTMCNC: 32.1 G/DL (ref 30–36.5)
MCV RBC AUTO: 89.6 FL (ref 80–99)
MCV RBC AUTO: 89.7 FL (ref 80–99)
NRBC # BLD: 0 K/UL (ref 0–0.01)
NRBC # BLD: 0 K/UL (ref 0–0.01)
NRBC BLD-RTO: 0 PER 100 WBC
NRBC BLD-RTO: 0 PER 100 WBC
PHOSPHATE SERPL-MCNC: 3.2 MG/DL (ref 2.6–4.7)
PLATELET # BLD AUTO: 101 K/UL (ref 150–400)
PLATELET # BLD AUTO: 108 K/UL (ref 150–400)
PMV BLD AUTO: 9.1 FL (ref 8.9–12.9)
PMV BLD AUTO: 9.6 FL (ref 8.9–12.9)
POTASSIUM SERPL-SCNC: 3.9 MMOL/L (ref 3.5–5.1)
RBC # BLD AUTO: 2.43 M/UL (ref 4.1–5.7)
RBC # BLD AUTO: 2.7 M/UL (ref 4.1–5.7)
SERVICE CMNT-IMP: ABNORMAL
SERVICE CMNT-IMP: ABNORMAL
SODIUM SERPL-SCNC: 141 MMOL/L (ref 136–145)
WBC # BLD AUTO: 6.6 K/UL (ref 4.1–11.1)
WBC # BLD AUTO: 7.3 K/UL (ref 4.1–11.1)

## 2021-03-16 PROCEDURE — 74011000250 HC RX REV CODE- 250: Performed by: NURSE ANESTHETIST, CERTIFIED REGISTERED

## 2021-03-16 PROCEDURE — 65660000000 HC RM CCU STEPDOWN

## 2021-03-16 PROCEDURE — 80048 BASIC METABOLIC PNL TOTAL CA: CPT

## 2021-03-16 PROCEDURE — 74011000258 HC RX REV CODE- 258: Performed by: NURSE ANESTHETIST, CERTIFIED REGISTERED

## 2021-03-16 PROCEDURE — 36415 COLL VENOUS BLD VENIPUNCTURE: CPT

## 2021-03-16 PROCEDURE — 82962 GLUCOSE BLOOD TEST: CPT

## 2021-03-16 PROCEDURE — 77030040684 HC NDL ENDOSC NEEDLEMASTR OCOA -B: Performed by: INTERNAL MEDICINE

## 2021-03-16 PROCEDURE — 0DBK8ZZ EXCISION OF ASCENDING COLON, VIA NATURAL OR ARTIFICIAL OPENING ENDOSCOPIC: ICD-10-PCS | Performed by: INTERNAL MEDICINE

## 2021-03-16 PROCEDURE — 0DBN8ZZ EXCISION OF SIGMOID COLON, VIA NATURAL OR ARTIFICIAL OPENING ENDOSCOPIC: ICD-10-PCS | Performed by: INTERNAL MEDICINE

## 2021-03-16 PROCEDURE — 2709999900 HC NON-CHARGEABLE SUPPLY: Performed by: INTERNAL MEDICINE

## 2021-03-16 PROCEDURE — 76060000033 HC ANESTHESIA 1 TO 1.5 HR: Performed by: INTERNAL MEDICINE

## 2021-03-16 PROCEDURE — 83735 ASSAY OF MAGNESIUM: CPT

## 2021-03-16 PROCEDURE — 74011000250 HC RX REV CODE- 250: Performed by: FAMILY MEDICINE

## 2021-03-16 PROCEDURE — 0DBL8ZZ EXCISION OF TRANSVERSE COLON, VIA NATURAL OR ARTIFICIAL OPENING ENDOSCOPIC: ICD-10-PCS | Performed by: INTERNAL MEDICINE

## 2021-03-16 PROCEDURE — 77030013992 HC SNR POLYP ENDOSC BSC -B: Performed by: INTERNAL MEDICINE

## 2021-03-16 PROCEDURE — 74011250636 HC RX REV CODE- 250/636: Performed by: FAMILY MEDICINE

## 2021-03-16 PROCEDURE — 85027 COMPLETE CBC AUTOMATED: CPT

## 2021-03-16 PROCEDURE — 74011250637 HC RX REV CODE- 250/637: Performed by: FAMILY MEDICINE

## 2021-03-16 PROCEDURE — 0DBM8ZZ EXCISION OF DESCENDING COLON, VIA NATURAL OR ARTIFICIAL OPENING ENDOSCOPIC: ICD-10-PCS | Performed by: INTERNAL MEDICINE

## 2021-03-16 PROCEDURE — 88305 TISSUE EXAM BY PATHOLOGIST: CPT

## 2021-03-16 PROCEDURE — 0DBP8ZZ EXCISION OF RECTUM, VIA NATURAL OR ARTIFICIAL OPENING ENDOSCOPIC: ICD-10-PCS | Performed by: INTERNAL MEDICINE

## 2021-03-16 PROCEDURE — C9113 INJ PANTOPRAZOLE SODIUM, VIA: HCPCS | Performed by: FAMILY MEDICINE

## 2021-03-16 PROCEDURE — 77030021593 HC FCPS BIOP ENDOSC BSC -A: Performed by: INTERNAL MEDICINE

## 2021-03-16 PROCEDURE — 76040000008: Performed by: INTERNAL MEDICINE

## 2021-03-16 PROCEDURE — 77030035028 HC CLP HEMSTAS ENDOSCP INSC COOK -C: Performed by: INTERNAL MEDICINE

## 2021-03-16 PROCEDURE — 84100 ASSAY OF PHOSPHORUS: CPT

## 2021-03-16 PROCEDURE — P9016 RBC LEUKOCYTES REDUCED: HCPCS

## 2021-03-16 PROCEDURE — 0DBH8ZZ EXCISION OF CECUM, VIA NATURAL OR ARTIFICIAL OPENING ENDOSCOPIC: ICD-10-PCS | Performed by: INTERNAL MEDICINE

## 2021-03-16 PROCEDURE — 36430 TRANSFUSION BLD/BLD COMPNT: CPT

## 2021-03-16 PROCEDURE — 74011250636 HC RX REV CODE- 250/636: Performed by: NURSE ANESTHETIST, CERTIFIED REGISTERED

## 2021-03-16 RX ORDER — EPINEPHRINE 0.1 MG/ML
1 INJECTION INTRACARDIAC; INTRAVENOUS
Status: DISCONTINUED | OUTPATIENT
Start: 2021-03-16 | End: 2021-03-16 | Stop reason: HOSPADM

## 2021-03-16 RX ORDER — INSULIN LISPRO 100 [IU]/ML
INJECTION, SOLUTION INTRAVENOUS; SUBCUTANEOUS
Status: DISCONTINUED | OUTPATIENT
Start: 2021-03-16 | End: 2021-03-18 | Stop reason: HOSPADM

## 2021-03-16 RX ORDER — NALOXONE HYDROCHLORIDE 0.4 MG/ML
0.4 INJECTION, SOLUTION INTRAMUSCULAR; INTRAVENOUS; SUBCUTANEOUS
Status: DISCONTINUED | OUTPATIENT
Start: 2021-03-16 | End: 2021-03-16 | Stop reason: HOSPADM

## 2021-03-16 RX ORDER — SODIUM CHLORIDE 0.9 % (FLUSH) 0.9 %
5-40 SYRINGE (ML) INJECTION EVERY 8 HOURS
Status: DISCONTINUED | OUTPATIENT
Start: 2021-03-16 | End: 2021-03-18 | Stop reason: HOSPADM

## 2021-03-16 RX ORDER — DEXTROSE 50 % IN WATER (D50W) INTRAVENOUS SYRINGE
12.5-25 AS NEEDED
Status: DISCONTINUED | OUTPATIENT
Start: 2021-03-16 | End: 2021-03-18 | Stop reason: HOSPADM

## 2021-03-16 RX ORDER — SODIUM CHLORIDE 9 MG/ML
50 INJECTION, SOLUTION INTRAVENOUS CONTINUOUS
Status: DISPENSED | OUTPATIENT
Start: 2021-03-16 | End: 2021-03-16

## 2021-03-16 RX ORDER — FLUMAZENIL 0.1 MG/ML
0.2 INJECTION INTRAVENOUS
Status: DISCONTINUED | OUTPATIENT
Start: 2021-03-16 | End: 2021-03-16 | Stop reason: HOSPADM

## 2021-03-16 RX ORDER — ATROPINE SULFATE 0.1 MG/ML
0.5 INJECTION INTRAVENOUS
Status: DISCONTINUED | OUTPATIENT
Start: 2021-03-16 | End: 2021-03-16 | Stop reason: HOSPADM

## 2021-03-16 RX ORDER — FENTANYL CITRATE 50 UG/ML
25-200 INJECTION, SOLUTION INTRAMUSCULAR; INTRAVENOUS
Status: DISCONTINUED | OUTPATIENT
Start: 2021-03-16 | End: 2021-03-16 | Stop reason: HOSPADM

## 2021-03-16 RX ORDER — MIDAZOLAM HYDROCHLORIDE 1 MG/ML
.25-5 INJECTION, SOLUTION INTRAMUSCULAR; INTRAVENOUS
Status: DISCONTINUED | OUTPATIENT
Start: 2021-03-16 | End: 2021-03-16 | Stop reason: HOSPADM

## 2021-03-16 RX ORDER — DEXTROMETHORPHAN/PSEUDOEPHED 2.5-7.5/.8
1.2 DROPS ORAL
Status: DISCONTINUED | OUTPATIENT
Start: 2021-03-16 | End: 2021-03-16 | Stop reason: HOSPADM

## 2021-03-16 RX ORDER — MAGNESIUM SULFATE 100 %
4 CRYSTALS MISCELLANEOUS AS NEEDED
Status: DISCONTINUED | OUTPATIENT
Start: 2021-03-16 | End: 2021-03-18 | Stop reason: HOSPADM

## 2021-03-16 RX ORDER — SODIUM CHLORIDE 0.9 % (FLUSH) 0.9 %
5-40 SYRINGE (ML) INJECTION AS NEEDED
Status: DISCONTINUED | OUTPATIENT
Start: 2021-03-16 | End: 2021-03-18 | Stop reason: HOSPADM

## 2021-03-16 RX ORDER — PROPOFOL 10 MG/ML
INJECTION, EMULSION INTRAVENOUS AS NEEDED
Status: DISCONTINUED | OUTPATIENT
Start: 2021-03-16 | End: 2021-03-16 | Stop reason: HOSPADM

## 2021-03-16 RX ORDER — SODIUM CHLORIDE 9 MG/ML
INJECTION, SOLUTION INTRAVENOUS
Status: DISCONTINUED | OUTPATIENT
Start: 2021-03-16 | End: 2021-03-16 | Stop reason: HOSPADM

## 2021-03-16 RX ORDER — SODIUM CHLORIDE 9 MG/ML
250 INJECTION, SOLUTION INTRAVENOUS AS NEEDED
Status: DISCONTINUED | OUTPATIENT
Start: 2021-03-16 | End: 2021-03-18 | Stop reason: HOSPADM

## 2021-03-16 RX ORDER — LIDOCAINE HYDROCHLORIDE 20 MG/ML
INJECTION, SOLUTION INFILTRATION; PERINEURAL AS NEEDED
Status: DISCONTINUED | OUTPATIENT
Start: 2021-03-16 | End: 2021-03-16 | Stop reason: HOSPADM

## 2021-03-16 RX ADMIN — PHENYLEPHRINE HYDROCHLORIDE 120 MCG: 10 INJECTION INTRAVENOUS at 12:25

## 2021-03-16 RX ADMIN — PHENYLEPHRINE HYDROCHLORIDE 40 MCG: 10 INJECTION INTRAVENOUS at 11:45

## 2021-03-16 RX ADMIN — VASOPRESSIN 2 UNITS: 20 INJECTION INTRAVENOUS at 12:35

## 2021-03-16 RX ADMIN — Medication 10 ML: at 07:55

## 2021-03-16 RX ADMIN — PROPOFOL 30 MG: 10 INJECTION, EMULSION INTRAVENOUS at 12:15

## 2021-03-16 RX ADMIN — PHENYLEPHRINE HYDROCHLORIDE 40 MCG: 10 INJECTION INTRAVENOUS at 11:43

## 2021-03-16 RX ADMIN — PHENYLEPHRINE HYDROCHLORIDE 40 MCG: 10 INJECTION INTRAVENOUS at 11:42

## 2021-03-16 RX ADMIN — PHENYLEPHRINE HYDROCHLORIDE 80 MCG: 10 INJECTION INTRAVENOUS at 11:57

## 2021-03-16 RX ADMIN — PHENYLEPHRINE HYDROCHLORIDE 80 MCG: 10 INJECTION INTRAVENOUS at 11:51

## 2021-03-16 RX ADMIN — PROPOFOL 40 MG: 10 INJECTION, EMULSION INTRAVENOUS at 11:47

## 2021-03-16 RX ADMIN — Medication 10 ML: at 21:46

## 2021-03-16 RX ADMIN — PROPOFOL 40 MG: 10 INJECTION, EMULSION INTRAVENOUS at 11:44

## 2021-03-16 RX ADMIN — PHENYLEPHRINE HYDROCHLORIDE 80 MCG: 10 INJECTION INTRAVENOUS at 12:29

## 2021-03-16 RX ADMIN — LIDOCAINE HYDROCHLORIDE 50 MG: 20 INJECTION, SOLUTION INFILTRATION; PERINEURAL at 11:36

## 2021-03-16 RX ADMIN — PROPOFOL 20 MG: 10 INJECTION, EMULSION INTRAVENOUS at 11:59

## 2021-03-16 RX ADMIN — PHENYLEPHRINE HYDROCHLORIDE 120 MCG: 10 INJECTION INTRAVENOUS at 12:20

## 2021-03-16 RX ADMIN — PHENYLEPHRINE HYDROCHLORIDE 40 MCG: 10 INJECTION INTRAVENOUS at 11:48

## 2021-03-16 RX ADMIN — PHENYLEPHRINE HYDROCHLORIDE 80 MCG: 10 INJECTION INTRAVENOUS at 12:01

## 2021-03-16 RX ADMIN — SODIUM CHLORIDE: 900 INJECTION, SOLUTION INTRAVENOUS at 11:31

## 2021-03-16 RX ADMIN — PHENYLEPHRINE HYDROCHLORIDE 80 MCG: 10 INJECTION INTRAVENOUS at 12:31

## 2021-03-16 RX ADMIN — PHENYLEPHRINE HYDROCHLORIDE 40 MCG: 10 INJECTION INTRAVENOUS at 11:46

## 2021-03-16 RX ADMIN — PROPOFOL 30 MG: 10 INJECTION, EMULSION INTRAVENOUS at 12:22

## 2021-03-16 RX ADMIN — PROPOFOL 40 MG: 10 INJECTION, EMULSION INTRAVENOUS at 12:36

## 2021-03-16 RX ADMIN — PHENYLEPHRINE HYDROCHLORIDE 160 MCG: 10 INJECTION INTRAVENOUS at 12:12

## 2021-03-16 RX ADMIN — PHENYLEPHRINE HYDROCHLORIDE 40 MCG: 10 INJECTION INTRAVENOUS at 11:40

## 2021-03-16 RX ADMIN — PROPOFOL 20 MG: 10 INJECTION, EMULSION INTRAVENOUS at 11:50

## 2021-03-16 RX ADMIN — PROPOFOL 40 MG: 10 INJECTION, EMULSION INTRAVENOUS at 11:53

## 2021-03-16 RX ADMIN — PHENYLEPHRINE HYDROCHLORIDE 40 MCG: 10 INJECTION INTRAVENOUS at 11:39

## 2021-03-16 RX ADMIN — PHENYLEPHRINE HYDROCHLORIDE 80 MCG: 10 INJECTION INTRAVENOUS at 11:52

## 2021-03-16 RX ADMIN — PHENYLEPHRINE HYDROCHLORIDE 40 MCG: 10 INJECTION INTRAVENOUS at 12:09

## 2021-03-16 RX ADMIN — PROPOFOL 40 MG: 10 INJECTION, EMULSION INTRAVENOUS at 11:39

## 2021-03-16 RX ADMIN — PROPOFOL 50 MG: 10 INJECTION, EMULSION INTRAVENOUS at 12:30

## 2021-03-16 RX ADMIN — Medication 10 ML: at 21:45

## 2021-03-16 RX ADMIN — PROPOFOL 30 MG: 10 INJECTION, EMULSION INTRAVENOUS at 12:33

## 2021-03-16 RX ADMIN — VASOPRESSIN 2 UNITS: 20 INJECTION INTRAVENOUS at 12:42

## 2021-03-16 RX ADMIN — PHENYLEPHRINE HYDROCHLORIDE 80 MCG: 10 INJECTION INTRAVENOUS at 11:59

## 2021-03-16 RX ADMIN — PHENYLEPHRINE HYDROCHLORIDE 80 MCG: 10 INJECTION INTRAVENOUS at 12:23

## 2021-03-16 RX ADMIN — PROPOFOL 40 MG: 10 INJECTION, EMULSION INTRAVENOUS at 11:37

## 2021-03-16 RX ADMIN — PROPOFOL 20 MG: 10 INJECTION, EMULSION INTRAVENOUS at 12:04

## 2021-03-16 RX ADMIN — PHENYLEPHRINE HYDROCHLORIDE 120 MCG: 10 INJECTION INTRAVENOUS at 12:07

## 2021-03-16 RX ADMIN — PHENYLEPHRINE HYDROCHLORIDE 80 MCG: 10 INJECTION INTRAVENOUS at 12:02

## 2021-03-16 RX ADMIN — PHENYLEPHRINE HYDROCHLORIDE 200 MCG: 10 INJECTION INTRAVENOUS at 12:16

## 2021-03-16 RX ADMIN — PHENYLEPHRINE HYDROCHLORIDE 160 MCG: 10 INJECTION INTRAVENOUS at 11:56

## 2021-03-16 RX ADMIN — PHENYLEPHRINE HYDROCHLORIDE 120 MCG: 10 INJECTION INTRAVENOUS at 11:53

## 2021-03-16 RX ADMIN — VASOPRESSIN 2 UNITS: 20 INJECTION INTRAVENOUS at 12:40

## 2021-03-16 RX ADMIN — PHENYLEPHRINE HYDROCHLORIDE 40 MCG: 10 INJECTION INTRAVENOUS at 11:49

## 2021-03-16 RX ADMIN — PHENYLEPHRINE HYDROCHLORIDE 80 MCG: 10 INJECTION INTRAVENOUS at 11:54

## 2021-03-16 RX ADMIN — PHENYLEPHRINE HYDROCHLORIDE 120 MCG: 10 INJECTION INTRAVENOUS at 12:04

## 2021-03-16 RX ADMIN — PHENYLEPHRINE HYDROCHLORIDE 80 MCG: 10 INJECTION INTRAVENOUS at 12:10

## 2021-03-16 RX ADMIN — PHENYLEPHRINE HYDROCHLORIDE 120 MCG: 10 INJECTION INTRAVENOUS at 12:28

## 2021-03-16 RX ADMIN — SODIUM CHLORIDE 40 MG: 9 INJECTION INTRAMUSCULAR; INTRAVENOUS; SUBCUTANEOUS at 21:45

## 2021-03-16 RX ADMIN — PROPOFOL 40 MG: 10 INJECTION, EMULSION INTRAVENOUS at 11:41

## 2021-03-16 RX ADMIN — ROSUVASTATIN 10 MG: 10 TABLET, FILM COATED ORAL at 21:45

## 2021-03-16 NOTE — PROGRESS NOTES
S Cardiology Progress Note    Date of Service: 3/16/2021    Subjective:  No acute events overnight. Hgb came up appropriately after PRBC but trended back down this morning to 7.0. Denies dyspnea, CP, LH. Feels back to normal.  Anxious to go home. GI plans colonoscopy and possible pill endoscopy today.     Objective:    Visit Vitals  BP (!) 117/57 (BP 1 Location: Left upper arm, BP Patient Position: At rest)   Pulse 88   Temp 98.8 °F (37.1 °C)   Resp 15   Wt 80.2 kg (176 lb 11.2 oz)   SpO2 98%   BMI 23.96 kg/m²         Intake/Output Summary (Last 24 hours) at 3/16/2021 1053  Last data filed at 3/15/2021 1858  Gross per 24 hour   Intake 780 ml   Output 225 ml   Net 555 ml        Physical Exam  GEN: NAD, appears stated age  HEENT: EOMI, MMM, OP clear  NECK: Normal JVP  CV: RRR, normal S1, soft S2, harsh III/VI late-peaking systolic murmur at LUSB, no rubs or gallops  LUNGS: CTAB, no W/R/R  ABD: NABS, soft, NT/ND  EXT: No edema, 2+ distal pulses  PSYCH: Mood and affect normal  NEURO: AAO, MAEW, face symmetrical, speech intact    Current Facility-Administered Medications   Medication Dose Route Frequency    midazolam (VERSED) injection 0.25-5 mg  0.25-5 mg IntraVENous Multiple    fentaNYL citrate (PF) injection  mcg   mcg IntraVENous Multiple    naloxone (NARCAN) injection 0.4 mg  0.4 mg IntraVENous Multiple    flumazeniL (ROMAZICON) 0.1 mg/mL injection 0.2 mg  0.2 mg IntraVENous Multiple    simethicone (MYLICON) 97DC/3.8SP oral drops 80 mg  1.2 mL Oral Multiple    atropine injection 0.5 mg  0.5 mg IntraVENous ONCE PRN    EPINEPHrine (ADRENALIN) 0.1 mg/mL syringe 1 mg  1 mg Endoscopically ONCE PRN    [START ON 3/17/2021] iron sucrose (VENOFER) 200 mg in 0.9% sodium chloride 100 mL IVPB  200 mg IntraVENous Q24H    0.9% sodium chloride infusion 250 mL  250 mL IntraVENous PRN    0.9% sodium chloride infusion 250 mL  250 mL IntraVENous PRN    0.9% sodium chloride infusion 250 mL  250 mL IntraVENous PRN    0.9% sodium chloride infusion 250 mL  250 mL IntraVENous PRN    amiodarone (CORDARONE) tablet 200 mg  200 mg Oral DAILY    rosuvastatin (CRESTOR) tablet 10 mg  10 mg Oral QHS    sodium chloride (NS) flush 5-40 mL  5-40 mL IntraVENous Q8H    sodium chloride (NS) flush 5-40 mL  5-40 mL IntraVENous PRN    pantoprazole (PROTONIX) 40 mg in 0.9% sodium chloride 10 mL injection  40 mg IntraVENous Q12H       Data Reviewed:  Recent Labs     03/16/21  0516 03/15/21  0940 03/14/21  1825    139 136   K 3.9 3.8 4.2   * 112* 109*   CO2 19* 22 20*   * 125* 193*   BUN 31* 37* 44*   CREA 0.92 0.91 1.16   CA 8.3* 8.1* 8.9   MG 2.2  --  2.1   PHOS 3.2  --   --    ALB  --  2.8* 3.2*   ALT  --  18 23   INR  --  1.2*  --      Recent Labs     03/16/21  0516 03/16/21  0008 03/15/21  1808 03/15/21  0940   WBC 6.6 7.3  --  4.1   HGB 7.0* 7.7* 7.5* 6.0*   HCT 21.8* 24.2*  --  19.1*   * 108*  --  94*     Lab Results   Component Value Date/Time    Specimen Description: NARES 07/18/2009 08:10 PM     Lab Results   Component Value Date/Time    Culture result:  07/18/2009 08:10 PM     MRSA NOT PRESENT      Screening of patient nares for MRSA is for surveillance purposes and, if positive, to facilitate isolation considerations in high risk settings. It is not intended for automatic decolonization interventions per se as regimens are not sufficiently effective to warrant routine use. All Cardiac Markers in the last 24 hours:    No results found for: CPK, CK, CKMMB, CKMB, RCK3, CKMBT, CKMBPOC, CKNDX, CKND1, ARTI, TROPT, TROIQ, RAMÍREZ, TROPT, TNIPOC, BNP, BNPP, BNPNT    Telemetry (personally reviewed): normal sinus rhythm    Assessment:  1. 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 NAFLD; TAVR CT with findings suggestive of possible cirrhosis, however, RUQ U/S shows no evidence of cirrhosis  3. Symptomatic, severe AS  4. HTN  5. AF/AFL s/p recent ablation by Dr. Derick Lundy:  - EGD 3/16/21 unremarkable, GI plans colonoscopy and possible capsule endoscopy today  - Agree with transfusion; goal Hgb > 8 given symptomatic severe aortic stenosis  - Continue amiodarone  - Agree with PPI; will defer potential need for oral PPI to GI  - Continue rosuvastatin  - If becomes hypertensive, can restart outpatient lisinopril at lower dose; hold for now given normal / at times low BP  - Hold aspirin and rivaroxaban; discussed with Dr. Zafar Singh and will put on ONLY apixaban 2.5 mg q12h once anemia is stable  - Continue plans for possible TAVR next Wednesday, 3/24/21  - Discussed with Dr. Elvera Buerger (GI)    Thank you for the opportunity to participate in the care of Cayetano Cassidy and please do not hesitate to contact us should you have any questions. Signed:  German Pacheco.  Sudarshan Chen, [facility] / [address] [place] [address] [facility]  03/16/21

## 2021-03-16 NOTE — PROCEDURES
[address]  [address] [facility], [address] [place] [address]      Colonoscopy Operative Report    Su Griffin  [id number]  1945      Procedure Type:   Colonoscopy with hot snare, cold biopsy, tattoo placement    Indications:  Melena, anemia        Pre-operative Diagnosis: see indication above    Post-operative Diagnosis:  See findings below    :  Liam Owens. Evy Dunaway MD      Referring Provider: Ramila Dobbins MD      Sedation:  MAC anesthesia Propofol      Procedure Details:  After informed consent was obtained with all risks and benefits of procedure explained and preoperative exam completed, the patient was taken to the endoscopy suite and placed in the left lateral decubitus position. Upon sequential sedation as per above, a digital rectal exam was performed demonstrating internal hemorrhoids. The Olympus pediatric videocolonoscope  was inserted in the rectum and carefully advanced to the terminal ileum. The cecum was identified by the ileocecal valve and appendiceal orifice. The quality of preparation was good. The colonoscope was slowly withdrawn with careful evaluation between folds. Retroflexion in the rectum was completed . Findings:   Rectum: in the proximal rectum/rectosigmoid at 19 cm, a non-obstructing 4-5 cm polypoid mass was seen. This was cold biopsied. [place] ink tattoo was placed distally. Sigmoid: a 2.5 cm pedunculated polyp was removed with hot snare. Single Resolution 360 clip was placed to close polypectomy site.  Moderate sigmoid diverticulosis  Descending Colon: there were two semi-sessile polyps 8-9 mm each, both removed with hot snare and single Resolution 360 clip placed at each polypectomy site  Transverse Colon:  there were two semi-sessile polyps 10-11 mm each, both removed with hot snare and single Resolution 360 clip placed at each polypectomy site  Ascending Colon: single 6-7 mm semi-sessile polyp, removed with hot snare  Cecum: two 2-3 mm sessile polyps - removed with cold biopsy forceps  Terminal Ileum: normal      Specimen Removed: 1. Cecal polyps, 2. Ascending colon polyp, 3. Transverse colon polyps, 4. Descending colon polyps, 5. Sigmoid colon polyp, 6. Rectal polypoid mass biopsy    Complications: None. EBL:  None. Impression:    1. Rectal polypoid mass - biopsied and tattooed  2. Multiple colon polyps - removed as above    Recommendations:  1. Cardiac regular diet  2. Would continue to hold anticoagulation as able  3. Follow up surgical pathology  4. Surgical consultation for rectal polypoid mass  5. Will follow    Signed By: Carlton Hedrick.  Venkat Olmos MD     3/16/2021  12:39 PM

## 2021-03-16 NOTE — ANESTHESIA PREPROCEDURE EVALUATION
Relevant Problems   No relevant active problems       Anesthetic History   No history of anesthetic complications            Review of Systems / Medical History  Patient summary reviewed, nursing notes reviewed and pertinent labs reviewed    Pulmonary  Within defined limits                 Neuro/Psych   Within defined limits           Cardiovascular      Valvular problems/murmurs: aortic stenosis      Dysrhythmias         Comments: Hx of Afib/flutter s/p ablation this year  Scheduled for Aortic Valve replacement this month. Severe Aortic Stenosis 50% LAD     GI/Hepatic/Renal  Within defined limits              Endo/Other        Anemia     Other Findings   Comments: Hgb 7.0         Physical Exam    Airway  Mallampati: II  TM Distance: > 6 cm  Neck ROM: normal range of motion   Mouth opening: Normal     Cardiovascular    Rhythm: regular  Rate: normal    Murmur, Aortic area     Dental  No notable dental hx       Pulmonary  Breath sounds clear to auscultation               Abdominal  GI exam deferred       Other Findings            Anesthetic Plan    ASA: 4, emergent  Anesthesia type: MAC            Anesthetic plan and risks discussed with: Patient      Will keep diastolic BP > 70 with neosynephrine.

## 2021-03-16 NOTE — PROGRESS NOTES
1030: Report given to RN in Endoscopy. Coming up to take him down for procedure. 1059: transport took patient off floor to procedure area, without RN per MD order. 1100: Verbal shift change report given to Macey Sherwood Rn (oncoming nurse) by Sandhya Mena (offgoing nurse). Report included the following information SBAR.

## 2021-03-16 NOTE — PROGRESS NOTES
2000: Report received from Trang Chavez [employer]. Patient care assumed. 2100: Bowel prep initiated. 0000: Bowel prep completed. Pt. NPO    CBC sent. 0008: Hgb. 7.7    0515: CBC sent, Hgb 7.     0700: .     0800: Bedside and Verbal shift change report given to Debbie Goss RN (oncoming nurse) by Ellen Finn RN (offgoing nurse). Report included the following information SBAR, Kardex, Procedure Summary, Intake/Output, MAR, Recent Results, Cardiac Rhythm NSR and Alarm Parameters .

## 2021-03-16 NOTE — PROGRESS NOTES
Transitions of Care: 14% risk of re-admission      -Patient will discharge home when medically stable, independent at baseline and lives with spouse    -TAVR anticipated for 3/24    The CM spoke with RN- undergoing additional GI testing today, no case management needs identified at this time. The patient is independent, lives at home with family- on room air, anticipate patient to discharge home when stable and return on 3/24 for TAVR. The CM will follow for transitions of care needs.      HINA Wade

## 2021-03-16 NOTE — PROGRESS NOTES
Problem: Patient Education: Go to Patient Education Activity  Goal: Patient/Family Education  Outcome: Progressing Towards Goal     Problem: Upper and Lower GI Bleed: Day 1  Goal: Activity/Safety  Outcome: Progressing Towards Goal  Goal: Consults, if ordered  Outcome: Progressing Towards Goal  Goal: Diagnostic Test/Procedures  Outcome: Progressing Towards Goal  Goal: Nutrition/Diet  Outcome: Progressing Towards Goal  Goal: Medications  Outcome: Progressing Towards Goal  Goal: Respiratory  Outcome: Progressing Towards Goal  Goal: Treatments/Interventions/Procedures  Outcome: Progressing Towards Goal  Goal: Psychosocial  Outcome: Progressing Towards Goal  Goal: *Optimal pain control at patient's stated goal  Outcome: Progressing Towards Goal  Goal: *Hemodynamically stable  Outcome: Progressing Towards Goal  Goal: *Demonstrates progressive activity  Outcome: Progressing Towards Goal     Problem: Upper and Lower GI Bleed: Day 2  Goal: Off Pathway (Use only if patient is Off Pathway)  Outcome: Progressing Towards Goal  Goal: Activity/Safety  Outcome: Progressing Towards Goal  Goal: Consults, if ordered  Outcome: Progressing Towards Goal  Goal: Diagnostic Test/Procedures  Outcome: Progressing Towards Goal  Goal: Nutrition/Diet  Outcome: Progressing Towards Goal  Goal: Discharge Planning  Outcome: Progressing Towards Goal  Goal: Medications  Outcome: Progressing Towards Goal  Goal: Respiratory  Outcome: Progressing Towards Goal  Goal: Treatments/Interventions/Procedures  Outcome: Progressing Towards Goal  Goal: *Optimal pain control at patient's stated goal  Outcome: Progressing Towards Goal  Goal: *Hemodynamically stable  Outcome: Progressing Towards Goal  Goal: *Tolerating diet  Outcome: Progressing Towards Goal  Goal: *Demonstrates progressive activity  Outcome: Progressing Towards Goal

## 2021-03-16 NOTE — ANESTHESIA POSTPROCEDURE EVALUATION
Procedure(s):  COLONOSCOPY  INJECTION  ENDOSCOPIC POLYPECTOMY  COLON BIOPSY  RESOLUTION CLIP. MAC    Anesthesia Post Evaluation        Patient location during evaluation: PACU  Note status: adequate. Level of consciousness: awake  Pain management: satisfactory to patient  Airway patency: patent  Anesthetic complications: no  Cardiovascular status: acceptable  Respiratory status: acceptable  Hydration status: acceptable  Post anesthesia nausea and vomiting:  controlled      INITIAL Post-op Vital signs:   Vitals Value Taken Time   /91 03/16/21 1345   Temp 36 °C (96.8 °F) 03/16/21 1310   Pulse 76 03/16/21 1346   Resp 20 03/16/21 1346   SpO2 97 % 03/16/21 1346   Vitals shown include unvalidated device data.

## 2021-03-16 NOTE — PROGRESS NOTES
TRANSFER - IN REPORT:    Verbal report received from Nathan Griffin. RN (name) on Schmitt Drum  being received from CCU (unit) for routine progression of care      Report consisted of patients Situation, Background, Assessment and   Recommendations(SBAR). Information from the following report(s) SBAR, Kardex, ED Summary, Intake/Output, MAR and Recent Results was reviewed with the receiving nurse. Opportunity for questions and clarification was provided. Assessment completed upon patients arrival to unit and care assumed.

## 2021-03-16 NOTE — PROGRESS NOTES
Taye Mendoza  1945  [id number]    Situation:    Scheduled Procedure: Procedure(s):  COLONOSCOPY  Verbal report received from: Isauro Garcia RN  Preoperative diagnosis: Anemia    Background:    Procedure: Procedure(s):  COLONOSCOPY  Physician performing procedure; Dr. Cruzito Valerio  HTN, a fib, diabetes, colonic lesions  Aortic regurgitation- TAVR needed on 3/24  hgb 4.2 on admission. hgb 7.0    SBAR QUESTIONS FLOOR TO ENDO RN    NPO Status/Last PO Intake: Since midnight    Pregnancy Test:Not applicable If yes, result: none    Is the patient taking Blood Thinners: YES If yes, list: Onur Salvia and last taken has been on hold. Is the patient diabetic:yes       If yes, what was the last BS: 122    Time taken? 4146  Anything given? no           Does the patient have a Pacemaker/Defibrillator in place?: no   Does the patient need antibiotics before/during/after procedure: no   If the patient is having a colon, How much prep was drank? 100% of ordered dose   What were the Colon prep results? Clear per RN   Does the patient have SCD in place:no   Is patient on CONTACT precautions:no        If yes, what kind of CONTACT precautions:     Assessment:  Are the vital signs stable prior to patient coming to ENDO?  yes  Is the patient alert/oriented and able to sign consent for the procedures:yes  How does the patient's abdomen feel prior to coming to ENDO? round and soft yes   Does the patient have a patient IV in place?  yes     Recommendation:  Family or Friend present no     Permission to share finding with Family or Friend N/A

## 2021-03-16 NOTE — PROGRESS NOTES
Hospitalist Progress Note  Dai Merchant MD  Answering service: [phone] from in house phone      Date of Service:  3/16/2021  NAME:  Su Griffin  :  1945  MRN:  [id number]    Admission Summary:   75M p/w MANCIA  Interval history / Subjective:   Patient seen and examined at bedside, feels well, looks pale, had bowel prep overnight. Ready for colonoscopy today. Will transfuse another unit of RBC today. And give some iron iv starting tomorrow     Assessment & Plan:     #. Dyspnea on exertion; acute anemia due to GI bleeding.  - initial hemoglobin 4.2; s/p 2 unit of PRBC. Monitor hemoglobin every 8 hours. - GI following, s/p EGD 3/16: no clear bleeding source. Plans for C-scope    2. Atrial fibrillation; s/p ablation. Hold Xarelto continue amiodarone  3. Aortic stenosis; [facility] 3/9/2021. Plan for TAVR on 3/24. Cardiology  4. History of 2 cm colonic lesion; GI evaluating. F/u op. Code status: Full  DVT prophylaxis: SCDs  Care Plan discussed with: Patient/Family and Nurse  Disposition: TBD >2days     Hospital Problems  Never Reviewed          Codes Class Noted POA    GIB (gastrointestinal bleeding) ICD-10-CM: K92.2  ICD-9-CM: 578.9  3/14/2021 Unknown            Review of Systems:   Pertinent items are mentioned in interval history. Vital Signs:    Last 24hrs VS reviewed since prior progress note. Most recent are:  Visit Vitals  BP (!) 117/57 (BP 1 Location: Left upper arm, BP Patient Position: At rest)   Pulse 88   Temp 98.8 °F (37.1 °C)   Resp 15   Wt 80.2 kg (176 lb 11.2 oz)   SpO2 98%   BMI 23.96 kg/m²         Intake/Output Summary (Last 24 hours) at 3/16/2021 1005  Last data filed at 3/15/2021 1858  Gross per 24 hour   Intake 780 ml   Output 225 ml   Net 555 ml        Physical Examination:   Evaluated face to face and examined 21    General:  Alert, oriented, No acute distress, pale.  Pleasant WM  Card:  S1, S2 without murmur, good peripheral perfusion  Resp:  No accessory muscle use, no wheezes, no crepitations  Abd:  Soft, non-tender, non-distended  Extremities:  No cyanosis or clubbing, no significant edema  Neuro:  Grossly normal, no focal neuro deficits, follows commands   Psych:  Good insight, not agitated. Data Review:    Review and/or order of clinical lab test  Review and/or order of tests in the radiology section of [facility]  Review and/or order of tests in the medicine section of [facility]  Labs:     Recent Labs     03/16/21  0516 03/16/21  0008   WBC 6.6 7.3   HGB 7.0* 7.7*   HCT 21.8* 24.2*   * 108*     Recent Labs     03/16/21  0516 03/15/21  0940 03/14/21  1825    139 136   K 3.9 3.8 4.2   * 112* 109*   CO2 19* 22 20*   BUN 31* 37* 44*   CREA 0.92 0.91 1.16   * 125* 193*   CA 8.3* 8.1* 8.9   MG 2.2  --  2.1   PHOS 3.2  --   --      Recent Labs     03/15/21  0940 03/14/21  1825   ALT 18 23   AP 59 69   TBILI 0.6 0.6   TP 5.7* 6.6   ALB 2.8* 3.2*   GLOB 2.9 3.4     Recent Labs     03/15/21  0940   INR 1.2*   PTP 12.3*      Recent Labs     03/15/21  0940   FERR 8*      No results found for: FOL, RBCF   No results for input(s): PH, PCO2, PO2 in the last 72 hours.   Recent Labs     03/14/21  1825   TROIQ <0.05     Lab Results   Component Value Date/Time    Cholesterol, total 142 07/20/2009 03:20 AM    HDL Cholesterol 16 (L) 07/20/2009 03:20 AM    LDL, calculated 104 (H) 07/20/2009 03:20 AM    Triglyceride 110 07/20/2009 03:20 AM    CHOL/HDL Ratio 8.9 (H) 07/20/2009 03:20 AM     Lab Results   Component Value Date/Time    Glucose (POC) 122 (H) 03/16/2021 07:57 AM    Glucose (POC) 136 (H) 03/09/2021 07:51 AM    Glucose (POC) 126 (H) 07/22/2009 11:41 AM    Glucose (POC) 110 07/22/2009 05:19 AM    Glucose (POC) 123 (H) 07/21/2009 10:33 PM     Lab Results   Component Value Date/Time    Color DARK YELLOW 07/18/2009 04:55 PM    Appearance CLOUDY 07/18/2009 04:55 PM    Specific gravity 1.019 07/18/2009 04:55 PM pH (UA) 7.0 07/18/2009 04:55 PM    Protein 100 (A) 07/18/2009 04:55 PM    Glucose NEGATIVE  07/18/2009 04:55 PM    Ketone TRACE (A) 07/18/2009 04:55 PM    Urobilinogen 0.2 07/18/2009 04:55 PM    Nitrites NEGATIVE  07/18/2009 04:55 PM    Leukocyte Esterase NEGATIVE  07/18/2009 04:55 PM    Epithelial cells 0-5 07/18/2009 04:55 PM    Bacteria NEGATIVE  07/18/2009 04:55 PM    WBC 0-4 07/18/2009 04:55 PM    RBC 0-3 07/18/2009 04:55 PM     Medications Reviewed:     Current Facility-Administered Medications   Medication Dose Route Frequency    0.9% sodium chloride infusion 250 mL  250 mL IntraVENous PRN    0.9% sodium chloride infusion 250 mL  250 mL IntraVENous PRN    0.9% sodium chloride infusion 250 mL  250 mL IntraVENous PRN    amiodarone (CORDARONE) tablet 200 mg  200 mg Oral DAILY    rosuvastatin (CRESTOR) tablet 10 mg  10 mg Oral QHS    sodium chloride (NS) flush 5-40 mL  5-40 mL IntraVENous Q8H    sodium chloride (NS) flush 5-40 mL  5-40 mL IntraVENous PRN    pantoprazole (PROTONIX) 40 mg in 0.9% sodium chloride 10 mL injection  40 mg IntraVENous Q12H   ______________________________________________________________________  EXPECTED LENGTH OF STAY: 3d 0h  ACTUAL LENGTH OF STAY:          2               Eva Peña MD

## 2021-03-17 ENCOUNTER — APPOINTMENT (OUTPATIENT)
Dept: VASCULAR SURGERY | Age: 76
DRG: 378 | End: 2021-03-17
Attending: NURSE PRACTITIONER
Payer: COMMERCIAL

## 2021-03-17 LAB
ABO + RH BLD: NORMAL
ANION GAP SERPL CALC-SCNC: 7 MMOL/L (ref 5–15)
BLD PROD TYP BPU: NORMAL
BLOOD GROUP ANTIBODIES SERPL: NORMAL
BPU ID: NORMAL
BUN SERPL-MCNC: 28 MG/DL (ref 6–20)
BUN/CREAT SERPL: 29 (ref 12–20)
CALCIUM SERPL-MCNC: 8 MG/DL (ref 8.5–10.1)
CHLORIDE SERPL-SCNC: 109 MMOL/L (ref 97–108)
CO2 SERPL-SCNC: 21 MMOL/L (ref 21–32)
CREAT SERPL-MCNC: 0.95 MG/DL (ref 0.7–1.3)
CROSSMATCH RESULT,%XM: NORMAL
GLUCOSE BLD STRIP.AUTO-MCNC: 110 MG/DL (ref 65–100)
GLUCOSE BLD STRIP.AUTO-MCNC: 120 MG/DL (ref 65–100)
GLUCOSE BLD STRIP.AUTO-MCNC: 126 MG/DL (ref 65–100)
GLUCOSE BLD STRIP.AUTO-MCNC: 136 MG/DL (ref 65–100)
GLUCOSE SERPL-MCNC: 115 MG/DL (ref 65–100)
HCT VFR BLD AUTO: 23.8 % (ref 36.6–50.3)
HCT VFR BLD AUTO: 25.3 % (ref 36.6–50.3)
HGB BLD-MCNC: 7.6 G/DL (ref 12.1–17)
HGB BLD-MCNC: 8.2 G/DL (ref 12.1–17)
POTASSIUM SERPL-SCNC: 3.9 MMOL/L (ref 3.5–5.1)
SERVICE CMNT-IMP: ABNORMAL
SODIUM SERPL-SCNC: 137 MMOL/L (ref 136–145)
SPECIMEN EXP DATE BLD: NORMAL
STATUS OF UNIT,%ST: NORMAL
UNIT DIVISION, %UDIV: 0

## 2021-03-17 PROCEDURE — 80048 BASIC METABOLIC PNL TOTAL CA: CPT

## 2021-03-17 PROCEDURE — 74011000258 HC RX REV CODE- 258: Performed by: INTERNAL MEDICINE

## 2021-03-17 PROCEDURE — 74011250636 HC RX REV CODE- 250/636: Performed by: INTERNAL MEDICINE

## 2021-03-17 PROCEDURE — 74011250637 HC RX REV CODE- 250/637: Performed by: NURSE PRACTITIONER

## 2021-03-17 PROCEDURE — 65270000029 HC RM PRIVATE

## 2021-03-17 PROCEDURE — 93880 EXTRACRANIAL BILAT STUDY: CPT

## 2021-03-17 PROCEDURE — 85014 HEMATOCRIT: CPT

## 2021-03-17 PROCEDURE — 74011250637 HC RX REV CODE- 250/637: Performed by: FAMILY MEDICINE

## 2021-03-17 PROCEDURE — 74011000250 HC RX REV CODE- 250: Performed by: FAMILY MEDICINE

## 2021-03-17 PROCEDURE — 99253 IP/OBS CNSLTJ NEW/EST LOW 45: CPT | Performed by: THORACIC SURGERY (CARDIOTHORACIC VASCULAR SURGERY)

## 2021-03-17 PROCEDURE — 82962 GLUCOSE BLOOD TEST: CPT

## 2021-03-17 PROCEDURE — C9113 INJ PANTOPRAZOLE SODIUM, VIA: HCPCS | Performed by: FAMILY MEDICINE

## 2021-03-17 PROCEDURE — 36415 COLL VENOUS BLD VENIPUNCTURE: CPT

## 2021-03-17 PROCEDURE — 99252 IP/OBS CONSLTJ NEW/EST SF 35: CPT | Performed by: NURSE PRACTITIONER

## 2021-03-17 PROCEDURE — 74011250636 HC RX REV CODE- 250/636: Performed by: FAMILY MEDICINE

## 2021-03-17 PROCEDURE — 74011250637 HC RX REV CODE- 250/637: Performed by: INTERNAL MEDICINE

## 2021-03-17 RX ORDER — ACETAMINOPHEN 325 MG/1
650 TABLET ORAL
Status: DISCONTINUED | OUTPATIENT
Start: 2021-03-17 | End: 2021-03-18 | Stop reason: HOSPADM

## 2021-03-17 RX ORDER — PANTOPRAZOLE SODIUM 40 MG/1
40 TABLET, DELAYED RELEASE ORAL
Status: DISCONTINUED | OUTPATIENT
Start: 2021-03-18 | End: 2021-03-18 | Stop reason: HOSPADM

## 2021-03-17 RX ADMIN — Medication 10 ML: at 07:09

## 2021-03-17 RX ADMIN — Medication 10 ML: at 14:00

## 2021-03-17 RX ADMIN — AMIODARONE HYDROCHLORIDE 200 MG: 200 TABLET ORAL at 09:07

## 2021-03-17 RX ADMIN — ACETAMINOPHEN 650 MG: 325 TABLET ORAL at 07:01

## 2021-03-17 RX ADMIN — IRON SUCROSE 200 MG: 20 INJECTION, SOLUTION INTRAVENOUS at 06:18

## 2021-03-17 RX ADMIN — ROSUVASTATIN 10 MG: 10 TABLET, FILM COATED ORAL at 21:05

## 2021-03-17 RX ADMIN — Medication 10 ML: at 21:05

## 2021-03-17 RX ADMIN — SODIUM CHLORIDE 40 MG: 9 INJECTION INTRAMUSCULAR; INTRAVENOUS; SUBCUTANEOUS at 09:07

## 2021-03-17 RX ADMIN — ACETAMINOPHEN 650 MG: 325 TABLET ORAL at 17:49

## 2021-03-17 RX ADMIN — APIXABAN 2.5 MG: 2.5 TABLET, FILM COATED ORAL at 17:49

## 2021-03-17 RX ADMIN — ACETAMINOPHEN 650 MG: 325 TABLET ORAL at 11:34

## 2021-03-17 NOTE — PROGRESS NOTES
Problem: Upper and Lower GI Bleed: Day 1  Goal: Off Pathway (Use only if patient is Off Pathway)  Outcome: Progressing Towards Goal

## 2021-03-17 NOTE — CONSULTS
[facility]  Inpatient Consultation        Admit Date: 3/14/2021  Reason for Consultation: GI bleeding/rectal mass    HPI:  Yasmeen Villalpando is a 70 y.o. male w/ PMHx as outlined below notably AS and afib, aflutter s/p ablation on xarelto whom we are asked to see in consultation by Dr. Anum Zaragoza for the above complaint. Pt presented to the ED on 3/14 w/ weakness and fatigue. He was found to have a hgb of 4.2. He denies any overt bleeding, cp, or SOB other than his baseline. He has AS and is scheduled for TAVR on 3/24. He was ablated for afib/flutter 3-4 weeks ago at [address] [place] [address] then started on xarelto and ASA. He rec'd transfusions and had a colonoscopy yesterday which shows the following:    EGD Findings: 3/15  Rectum: in the proximal rectum/rectosigmoid at 19 cm, a non-obstructing 4-5 cm polypoid mass was seen. This was cold biopsied. [place] ink tattoo was placed distally. Sigmoid: a 2.5 cm pedunculated polyp was removed with hot snare. Single Resolution 360 clip was placed to close polypectomy site. Moderate sigmoid diverticulosis  Descending Colon: there were two semi-sessile polyps 8-9 mm each, both removed with hot snare and single Resolution 360 clip placed at each polypectomy site  Transverse Colon:  there were two semi-sessile polyps 10-11 mm each, both removed with hot snare and single Resolution 360 clip placed at each polypectomy site  Ascending Colon: single 6-7 mm semi-sessile polyp, removed with hot snare  Cecum: two 2-3 mm sessile polyps - removed with cold biopsy forceps  Terminal Ileum: normal     Specimen Removed: 1. Cecal polyps, 2. Ascending colon polyp, 3. Transverse colon polyps, 4. Descending colon polyps, 5. Sigmoid colon polyp, 6.  Rectal polypoid mass biopsy       Patient Active Problem List    Diagnosis Date Noted    GIB (gastrointestinal bleeding) 03/14/2021     Past Medical History:   Diagnosis Date    Aortic regurgitation     Aortic stenosis     [facility] [address]     Colon polyps     Diabetes mellitus, type 2 ([facility] [address]     GI bleed     HTN (hypertension)       Past Surgical History:   Procedure Laterality Date    COLONOSCOPY N/A 3/16/2021    COLONOSCOPY performed by Socorro Perkins MD at [facility] ENDOSCOPY    HX AFIB ABLATION  02/2021      Social History     Tobacco Use    Smoking status: Not on file   Substance Use Topics    Alcohol use: Not on file      History reviewed. No pertinent family history. Prior to Admission medications    Medication Sig Start Date End Date Taking? Authorizing Provider   amiodarone (CORDARONE) 200 mg tablet Take 200 mg by mouth. Yes Provider, Historical   lisinopriL (PRINIVIL, ZESTRIL) 40 mg tablet Take 40 mg by mouth daily. Only took 10 mg today pt stated due to his B/P   Yes Provider, Historical   metFORMIN (GLUCOPHAGE) 500 mg tablet Take 500 mg by mouth daily (with breakfast). Yes Provider, Historical   multivitamin (ONE A DAY) tablet Take 1 Tab by mouth daily. Yes Provider, Historical   rivaroxaban (Xarelto) 20 mg tab tablet Take 20 mg by mouth daily (with dinner). Yes Provider, Historical   aspirin delayed-release 81 mg tablet Take 1 Tab by mouth daily. Indications: mild coronary artery disease 3/9/21  Yes Holly Hodgson MD   pantoprazole (PROTONIX) 40 mg tablet Take 40 mg by mouth daily. Provider, Historical   rosuvastatin (CRESTOR) 10 mg tablet Take 1 Tab by mouth nightly.  Indications: hardening of the arteries due to plaque buildup 3/9/21   Holly Hodgson MD     Current Facility-Administered Medications   Medication Dose Route Frequency    acetaminophen (TYLENOL) tablet 650 mg  650 mg Oral Q6H PRN    sodium chloride (NS) flush 5-40 mL  5-40 mL IntraVENous Q8H    sodium chloride (NS) flush 5-40 mL  5-40 mL IntraVENous PRN    iron sucrose (VENOFER) 200 mg in 0.9% sodium chloride 100 mL IVPB  200 mg IntraVENous Q24H    0.9% sodium chloride infusion 250 mL  250 mL IntraVENous PRN    insulin lispro (HUMALOG) injection   SubCUTAneous AC&HS    glucose chewable tablet 16 g  4 Tab Oral PRN    dextrose (D50W) injection syrg 12.5-25 g  12.5-25 g IntraVENous PRN    glucagon (GLUCAGEN) injection 1 mg  1 mg IntraMUSCular PRN    0.9% sodium chloride infusion 250 mL  250 mL IntraVENous PRN    0.9% sodium chloride infusion 250 mL  250 mL IntraVENous PRN    0.9% sodium chloride infusion 250 mL  250 mL IntraVENous PRN    amiodarone (CORDARONE) tablet 200 mg  200 mg Oral DAILY    rosuvastatin (CRESTOR) tablet 10 mg  10 mg Oral QHS    sodium chloride (NS) flush 5-40 mL  5-40 mL IntraVENous Q8H    sodium chloride (NS) flush 5-40 mL  5-40 mL IntraVENous PRN    pantoprazole (PROTONIX) 40 mg in 0.9% sodium chloride 10 mL injection  40 mg IntraVENous Q12H     No Known Allergies       Subjective:     Review of Systems:    A comprehensive review of systems was negative except for that written in the History of Present Illness. Objective:     Blood pressure 134/65, pulse 81, temperature 99.3 °F (37.4 °C), resp. rate 18, weight 176 lb 11.2 oz (80.2 kg), SpO2 95 %. Temp (24hrs), Av.5 °F (36.9 °C), Min:96.8 °F (36 °C), Max:99.4 °F (37.4 °C)      Recent Labs     21  01321  0516 21  0008 03/15/21  0940 03/15/21  0940   WBC  --  6.6 7.3  --  4.1   HGB 7.6* 7.0* 7.7*   < > 6.0*   HCT 23.8* 21.8* 24.2*  --  19.1*   PLT  --  101* 108*  --  94*    < > = values in this interval not displayed. Recent Labs     21  0516 03/15/21  0940 21  1825    141 139 136   K 3.9 3.9 3.8 4.2   * 112* 112* 109*   CO2 21 19* 22 20*   * 117* 125* 193*   BUN 28* 31* 37* 44*   CREA 0.95 0.92 0.91 1.16   CA 8.0* 8.3* 8.1* 8.9   MG  --  2.2  --  2.1   PHOS  --  3.2  --   --    ALB  --   --  2.8* 3.2*   TBILI  --   --  0.6 0.6   ALT  --   --  18 23   INR  --   --  1.2*  --      No results for input(s): AML, LPSE in the last 72 hours.       Intake/Output Summary (Last 24 hours) at 3/17/2021 0936  Last data filed at 3/16/2021 1259  Gross per 24 hour   Intake 700 ml   Output 0 ml   Net 700 ml       _____________________  Physical Exam:     General:  Alert, cooperative, no distress, appears stated age. Eyes:   Sclera clear. Throat: Lips, mucosa, and tongue normal.   Neck: Supple, symmetrical, trachea midline. Lungs:   Clear to auscultation bilaterally. Heart:  Regular rate and rhythm. +III/VI murmur   Abdomen:   Normal BS, flat, Soft, non-tender. No masses,  No organomegaly. Extremities: Extremities normal, atraumatic, no cyanosis or edema. Skin: Skin color, texture, turgor normal. No rashes or lesions. Assessment:   Active Problems:    GIB (gastrointestinal bleeding) (3/14/2021)            Plan:     Cont BID PPI  Monitor H/H, bleeding  Dr Adriana Painting to see regarding rectal mass and surgical options  Thank you for allowing us to participate in the care of this patient. Attending plan:   Given his degree of aortic stenosis, would plan for patient to have TAVR first prior to having colectomy given that he does not appear to have active bleeding from the mass currently. Pathology still pending from biopsy. Will discuss with Cardiology/Cardiac Surgery teams. See also my note below. Total time spent with patient: 30 minutes. Signed By: Deepa Briones NP     March 17, 2021        Attending addendum:   I have independently examined the patient and have reviewed the chart. I agree with the above plan. Patient with recto-sigmoid mass noted on virtual colonoscopy. He has been in the hospital due to severe anemia but notes no signs of gross blood that he has noted in his stool. EGD unremarkable. Colonoscopy showed a 4-5 cm polypoid mass in the recto-sigmoid junction at 19 cm from anal verge. This was biposied and tattooed distally. Additional polyps were removed with snare polypectomy. Pathology pending.   We discussed that it would be safer to have his TAVR prior to undergoing colectomy but this will result in a delay of doing his colon surgery due to needing the valve to incorporate to reduce risk of infection. If his pathology shows malignancy, he will need a CT C/A/P for staging and CEA checked. I will plan to see him back as outpatient but am ok with him proceeding with TAVR as planned and we can address colon afterwards. Will discuss with Cardiology/Cardiac Surgery team regarding when he would be ok to undergo colon surgery afterwards. He would need a laparoscopic sigmoid colectomy/LAR. 30 mins of time was spent with the patient of which > 50% of the time involved face-to-face counseling of the patient regarding the proposed treatment plan.       Caitlin Rebollar MD  3/17/2021  2:49 PM

## 2021-03-17 NOTE — PROGRESS NOTES
Hospitalist Progress Note  Rafael Dc MD  Answering service: [phone] from in house phone      Date of Service:  3/17/2021  NAME:  Dana Martinez  :  1945  MRN:  [id number]    Admission Summary:   75M p/w MANCIA  Interval history / Subjective:   Patient seen and examined at bedside, feels ok, got R knee effusion, causing little pain, improving with tylenol. Not keen on ortho eval now. Had same swelling/pain before, f/u op with ortho. Got 1 unit of RBC last night, HB 7.6 this am, he feels no sob walking to bathroom. Getting iron iv today. Phil Moscoso on dc if possible. Assessment & Plan:     #. Dyspnea on exertion; acute anemia due to GI bleeding.  - initial Hb 4.2; s/p 2 unit of PRBC. Monitor hb and transfuse for <8 given severe AS   - GI following, s/p EGD 3/16: no clear bleeding source. - colonoscopy 3/16: polypoid mass in rectum 'biopsies pending'. Multiple colon polyps. - Surgery consult pending. 2. Atrial fibrillation; s/p ablation. Hold Xarelto continue amiodarone  3. Aortic stenosis; [facility] 3/9/2021. Plan for TAVR on 3/24. Cardiology following. 4. History of 2 cm colonic lesion; GI evaluating. F/u op. Code status: Full  DVT prophylaxis: SCDs  Care Plan discussed with: Patient/Family and Nurse  Disposition: TBD >2days     Hospital Problems  Never Reviewed          Codes Class Noted POA    GIB (gastrointestinal bleeding) ICD-10-CM: K92.2  ICD-9-CM: 578.9  3/14/2021 Unknown            Review of Systems:   Pertinent items are mentioned in interval history. Vital Signs:    Last 24hrs VS reviewed since prior progress note.  Most recent are:  Visit Vitals  BP (!) 125/55 (BP 1 Location: Left upper arm, BP Patient Position: At rest)   Pulse 81   Temp 98.3 °F (36.8 °C)   Resp 18   Wt 80.2 kg (176 lb 11.2 oz)   SpO2 97%   BMI 23.96 kg/m²         Intake/Output Summary (Last 24 hours) at 3/17/2021 0735  Last data filed at 3/16/2021 1259  Gross per 24 hour   Intake 700 ml   Output 0 ml   Net 700 ml        Physical Examination:   Evaluated face to face and examined 03/17/21    General:  Alert, oriented, No acute distress, pale. Pleasant WM  Card:  S1, S2 systolic murmur, good peripheral perfusion  Resp:  No accessory muscle use, no wheezes, no crepitations  Abd:  Soft, non-tender, non-distended  Extremities:  No cyanosis or clubbing, no significant edema  Neuro:  Grossly normal, no focal neuro deficits, follows commands   Psych:  Good insight, not agitated. Data Review:    Review and/or order of clinical lab test  Review and/or order of tests in the radiology section of [facility]  Review and/or order of tests in the medicine section of [facility]  Labs:     Recent Labs     03/17/21 0135 03/16/21  0516 03/16/21  0008   WBC  --  6.6 7.3   HGB 7.6* 7.0* 7.7*   HCT 23.8* 21.8* 24.2*   PLT  --  101* 108*     Recent Labs     03/17/21 0135 03/16/21  0516 03/15/21  0940 03/14/21  1825    141 139 136   K 3.9 3.9 3.8 4.2   * 112* 112* 109*   CO2 21 19* 22 20*   BUN 28* 31* 37* 44*   CREA 0.95 0.92 0.91 1.16   * 117* 125* 193*   CA 8.0* 8.3* 8.1* 8.9   MG  --  2.2  --  2.1   PHOS  --  3.2  --   --      Recent Labs     03/15/21  0940 03/14/21  1825   ALT 18 23   AP 59 69   TBILI 0.6 0.6   TP 5.7* 6.6   ALB 2.8* 3.2*   GLOB 2.9 3.4     Recent Labs     03/15/21  0940   INR 1.2*   PTP 12.3*      Recent Labs     03/15/21  0940   FERR 8*      No results found for: FOL, RBCF   No results for input(s): PH, PCO2, PO2 in the last 72 hours.   Recent Labs     03/14/21  1825   TROIQ <0.05     Lab Results   Component Value Date/Time    Cholesterol, total 142 07/20/2009 03:20 AM    HDL Cholesterol 16 (L) 07/20/2009 03:20 AM    LDL, calculated 104 (H) 07/20/2009 03:20 AM    Triglyceride 110 07/20/2009 03:20 AM    CHOL/HDL Ratio 8.9 (H) 07/20/2009 03:20 AM     Lab Results   Component Value Date/Time    Glucose (POC) 120 (H) 03/17/2021 06:14 AM    Glucose (POC) 111 (H) 03/16/2021 09:55 PM    Glucose (POC) 122 (H) 03/16/2021 07:57 AM    Glucose (POC) 136 (H) 03/09/2021 07:51 AM    Glucose (POC) 126 (H) 07/22/2009 11:41 AM     Lab Results   Component Value Date/Time    Color DARK YELLOW 07/18/2009 04:55 PM    Appearance CLOUDY 07/18/2009 04:55 PM    Specific gravity 1.019 07/18/2009 04:55 PM    pH (UA) 7.0 07/18/2009 04:55 PM    Protein 100 (A) 07/18/2009 04:55 PM    Glucose NEGATIVE  07/18/2009 04:55 PM    Ketone TRACE (A) 07/18/2009 04:55 PM    Urobilinogen 0.2 07/18/2009 04:55 PM    Nitrites NEGATIVE  07/18/2009 04:55 PM    Leukocyte Esterase NEGATIVE  07/18/2009 04:55 PM    Epithelial cells 0-5 07/18/2009 04:55 PM    Bacteria NEGATIVE  07/18/2009 04:55 PM    WBC 0-4 07/18/2009 04:55 PM    RBC 0-3 07/18/2009 04:55 PM     Medications Reviewed:     Current Facility-Administered Medications   Medication Dose Route Frequency    acetaminophen (TYLENOL) tablet 650 mg  650 mg Oral Q6H PRN    sodium chloride (NS) flush 5-40 mL  5-40 mL IntraVENous Q8H    sodium chloride (NS) flush 5-40 mL  5-40 mL IntraVENous PRN    iron sucrose (VENOFER) 200 mg in 0.9% sodium chloride 100 mL IVPB  200 mg IntraVENous Q24H    0.9% sodium chloride infusion 250 mL  250 mL IntraVENous PRN    insulin lispro (HUMALOG) injection   SubCUTAneous AC&HS    glucose chewable tablet 16 g  4 Tab Oral PRN    dextrose (D50W) injection syrg 12.5-25 g  12.5-25 g IntraVENous PRN    glucagon (GLUCAGEN) injection 1 mg  1 mg IntraMUSCular PRN    0.9% sodium chloride infusion 250 mL  250 mL IntraVENous PRN    0.9% sodium chloride infusion 250 mL  250 mL IntraVENous PRN    0.9% sodium chloride infusion 250 mL  250 mL IntraVENous PRN    amiodarone (CORDARONE) tablet 200 mg  200 mg Oral DAILY    rosuvastatin (CRESTOR) tablet 10 mg  10 mg Oral QHS    sodium chloride (NS) flush 5-40 mL  5-40 mL IntraVENous Q8H    sodium chloride (NS) flush 5-40 mL  5-40 mL IntraVENous PRN    pantoprazole (PROTONIX) 40 mg in 0.9% sodium chloride 10 mL injection  40 mg IntraVENous Q12H   ______________________________________________________________________  EXPECTED LENGTH OF STAY: 3d 0h  ACTUAL LENGTH OF STAY:          Yen Tracy MD

## 2021-03-17 NOTE — CONSULTS
[address]    Name:  Janine Shine  MR#:  [id number]  :  1945  ACCOUNT #:  [de-identified]  DATE OF SERVICE:  2021      HISTORY OF PRESENT ILLNESS:  We were asked to see the patient by Dr. Roel Lynn following his recent hospitalization for dyspnea, lethargy, and poor energy level related to significant anemia, likely GI bleeding. I reviewed his hospital course including his cardiac catheterization and imaging studies in his evaluation for severe aortic stenosis. Since his hospitalization, the patient has been transfused and given intravenous iron. He reports feeling much better. Denies any shortness of breath or chest pain currently, although his history was that he had been seen and evaluated for aortic stenosis following detection of a murmur several months ago and is scheduled for a transcatheter aortic valve replacement this month. He reports no bloody stools and no syncopal episodes. REVIEW OF SYSTEMS:  Negative with the exception of the above noted in the history of present illness. He denies any productive cough or peripheral edema and no history of TIAs. PAST MEDICAL HISTORY:  Really unremarkable with the exception of atrial fibrillation. He is status post ablation, on anticoagulant therapy. PHYSICAL EXAMINATION:  GENERAL:  He is alert and comfortable. VITAL SIGNS:  Stable. NECK:  There are soft transmitted bilateral cervical bruits. HEART:  In a regular rhythm. There is a systolic ejection murmur. LUNGS:  His lung fields are clear. EXTREMITIES:  There is no peripheral edema. LABORATORY DATA:  His laboratory review reveals a hematocrit of 26.3, up from 15. IMPRESSION:  1. Severe symptomatic aortic stenosis. 2.  Chronic systolic congestive heart failure. 3.  Acute blood loss anemia, probably related to a combination of anticoagulation and gastrointestinal polyps.                             PLAN:  I discussed with him the followup needed. As long as he continues to be stable with no overt blood loss, I think it is appropriate to proceed with his transcatheter aortic valve replacement. He is aware of the indications and risks.       MD KELBY [id number]  D:  03/17/2021 12:33  T:  03/17/2021 14:44  JOB #:  [id number]

## 2021-03-17 NOTE — CONSULTS
Patient: Wolf Cunningham   Age: 70 y.o. Patient Care Team:  Ary Schreiber MD as PCP - General (Family Medicine)  Ary Schreiber MD as PCP - [facility]  Zora Mcfarlane MD (Cardiology)  Get Taylor MD (Cardiothoracic Surgery)    PCP: Ary Schreiber MD    Cardiologist: Dr. Aguilera Postal    Diagnosis/Reason for Consultation: The primary encounter diagnosis was Anemia, unspecified type. A diagnosis of Gastrointestinal hemorrhage, unspecified gastrointestinal hemorrhage type was also pertinent to this visit. Problem List:   Patient Active Problem List   Diagnosis Code    GIB (gastrointestinal bleeding) K92.2         HPI: 70 y.o.  male with PMHx of Aortic Stenosis, Atrial Fibrillation with recent Ablation on Xarelto, GI Bleed, HTN, Colon polyps, DM II, that is referred to the [address] [place] [address] by Dr. Aguilera Postal for interventional evaluation of  Aortic Stenosis. The patient is currently admitted for treatment and work up of GI Bleed with Heme + stool and with Hgb of 4.2 on admit. He received 4 units of PRBCs on this admission. His Xarelto was placed on hold and has underwent an EGD and Colonoscopy. On Colonoscopy he was found to have a 4-5 cm mass which was biopsied-results pending and multiple colon polyps. General Surgery has consulted for rectal mass and awaiting surgical recommendations from Dr. Ivelisse Sargent. Prior to this acute episode, the patient reports that he used to go to the gym routinely for exercise. Since COVID, however, he has remained at home and felt that his decreased activity was a product of his reduction in exercise. 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 He takes his dog for walks of about a mile and has found that he has gotten more tired with over the last several months. He denies worsening fatigue, palpitation, chest pain, syncope or falls, orthopnea, PND, LE edema. He did note some dizziness but only recently.  He started Xarelto over the last month due to new diagnosis of atrial fibrillation and subsequent ablation. He said that he has never noticed blood in his stool. Still working full time as a Banker. Lives with his wife in a house. His wife is able to assist following surgery. SOB/MANCIA: Yes   Fatigue: No:    Palpitations: No:    Chest pain: No:    Chest tightness with activity: No:    Lightheadedness: Yes just recently with the anemia  Syncope: No:    Falls: No:    Orthopnea: No:    PND: No:    LE edema: No:    Medication changes in past 3 months: Yes Xarelto added for new diagnosis of Afib  Blood/Blackness/Tarriness of stools: No:    Heart Failure Admission w/in past year:  No:    Heart Failure Admission w/in past 2 weeks: No:     NYHA Classification: IIB   Class II (Mild): Slight limitation of physical activity. Comfortable at rest, but ordinary physical activity results in fatigue, palpitation, or dyspnea. Angina Classification: 0   Class 0: No symptoms      Past Medical History:   Diagnosis Date    Aortic regurgitation     Aortic stenosis     Atrial fibrillation (HCC)     Colon polyps     Diabetes mellitus, type 2 (HCC)     GI bleed     HTN (hypertension)      Endocarditis: No:    Pulmonary HTN:  No: Greater than 2/3 systemic: No:   Immunocompromised/Steroids: No:   Liver Dz/Cirrhosis: No:    If yes, MELD score:  n/a  Last Dental Visit:  1 year   Any dental pain/concerns:  none    Past Surgical History:   Procedure Laterality Date    COLONOSCOPY N/A 3/16/2021    COLONOSCOPY performed by Annie Vázquez MD at [facility] END[facility]COPY    HX AFIB ABLATION  02/2021      Social History     Tobacco Use    Smoking status: Not on file   Substance Use Topics    Alcohol use: Not on file      History reviewed. No pertinent family history. Prior to Admission medications    Medication Sig Start Date End Date Taking? Authorizing Provider   amiodarone (CORDARONE) 200 mg tablet Take 200 mg by mouth.    Yes Provider, Historical   lisinopriL (PRINIVIL, ZESTRIL) 40 mg tablet Take 40 mg by mouth daily. Only took 10 mg today pt stated due to his B/P   Yes Provider, Historical   metFORMIN (GLUCOPHAGE) 500 mg tablet Take 500 mg by mouth daily (with breakfast). Yes Provider, Historical   multivitamin (ONE A DAY) tablet Take 1 Tab by mouth daily. Yes Provider, Historical   rivaroxaban (Xarelto) 20 mg tab tablet Take 20 mg by mouth daily (with dinner). Yes Provider, Historical   aspirin delayed-release 81 mg tablet Take 1 Tab by mouth daily. Indications: mild coronary artery disease 3/9/21  Yes Emily Ramires MD   pantoprazole (PROTONIX) 40 mg tablet Take 40 mg by mouth daily. Provider, Historical   rosuvastatin (CRESTOR) 10 mg tablet Take 1 Tab by mouth nightly.  Indications: hardening of the arteries due to plaque buildup 3/9/21   Emily Ramires MD       No Known Allergies    Current Medications:   Current Facility-Administered Medications   Medication Dose Route Frequency Provider Last Rate Last Admin    acetaminophen (TYLENOL) tablet 650 mg  650 mg Oral Q6H PRN Gladys Stafford NP   650 mg at 03/17/21 0701    sodium chloride (NS) flush 5-40 mL  5-40 mL IntraVENous Q8H Dianne Phoenix P., MD   10 mL at 03/17/21 0709    sodium chloride (NS) flush 5-40 mL  5-40 mL IntraVENous PRN Apoorva Mcdermott MD        iron sucrose (VENOFER) 200 mg in 0.9% sodium chloride 100 mL IVPB  200 mg IntraVENous Q24H Silvio Jackson  mL/hr at 03/17/21 0618 200 mg at 03/17/21 0618    0.9% sodium chloride infusion 250 mL  250 mL IntraVENous PRN Silvio Jackson MD        insulin lispro (HUMALOG) injection   SubCUTAneous AC&HS Michelle Worthy MD   Stopped at 03/16/21 2200    glucose chewable tablet 16 g  4 Tab Oral PRN Silvio Jackson MD        dextrose (D50W) injection syrg 12.5-25 g  12.5-25 g IntraVENous PRN Silvio Jackson MD        glucagon (GLUCAGEN) injection 1 mg  1 mg IntraMUSCular PRN Michelle Worthy MD       [facility] 0.9% sodium chloride infusion 250 mL  250 mL IntraVENous PRN Sarah Whyte MD        0.9% sodium chloride infusion 250 mL  250 mL IntraVENous PRN Sarah Whyte MD        0.9% sodium chloride infusion 250 mL  250 mL IntraVENous PRN Waldemar Perales MD        amiodarone (CORDARONE) tablet 200 mg  200 mg Oral DAILY Waldemar Perales MD   Stopped at 03/16/21 0900    rosuvastatin (CRESTOR) tablet 10 mg  10 mg Oral QHS Waldemar Perales MD   10 mg at 03/16/21 2145    sodium chloride (NS) flush 5-40 mL  5-40 mL IntraVENous Q8H Waldemar Perales MD   10 mL at 03/17/21 0709    sodium chloride (NS) flush 5-40 mL  5-40 mL IntraVENous PRN Waldemar Perales MD        pantoprazole (PROTONIX) 40 mg in 0.9% sodium chloride 10 mL injection  40 mg IntraVENous Q12H Waldemar Perales MD   40 mg at 03/16/21 2145       Vitals: Blood pressure 134/65, pulse 81, temperature 99.3 °F (37.4 °C), resp. rate 18, weight 176 lb 11.2 oz (80.2 kg), SpO2 95 %. Allergies: has No Known Allergies.     Review of Systems: Pertinent Positives per HPI   [] Unable to obtain  ROS due to  []mental status change  []sedated   []intubated   [x]Total of 13 systems reviewed as follows:  Constitutional: Negative fever, negative chills  Eyes:   Negative for amauroses fugax  ENT:   Negative sore throat,oral abscess   Endocrine Negative for thyroid replacement Rx; goiter; +DM  Respiratory:  Negative chronic cough,sputum production, +shortness of breath on exertion  Cards:   Negative for palpitations, varicosities, claudication, lower extremity edema  GI:   Negative for dysphagia, nausea, vomiting, diarrhea, and abdominal pain, +bleeding,   Genitourinary: Negative for frequency, dysuria, BPH   Integument:  Negative for rash and pruritus  Hematologic:  Negative for easy bruising; bleeding dyscrasia, +anemia  Musculoskel: Negative for muscle weakness inhibiting ambulation  Neurological:  Negative for stroke, TIA, syncope, dizziness  Behavl/Psych: Negative for feelings of anxiety, depression     Cardiovascular Testing:     EKG: 3/14/21  3/15/2021  1:58 PM - Benigno, Card Result In    Component Value Ref Range & Units Status   Ventricular Rate 99  BPM Final   Atrial Rate 99  BPM Final   P-R Interval 176  ms Final   QRS Duration 88  ms Final   Q-T Interval 376  ms Final   QTC Calculation (Bezet) 482  ms Final   Calculated P Axis 61  degrees Final   Calculated R Axis 58  degrees Final   Calculated T Axis 73  degrees Final   Diagnosis   Final   Normal sinus rhythm   Left ventricular hypertrophy with repolarization abnormality   Prolonged QT          TTE:  1/12/21 Completed at [facility] and scanned into media  Summary  1. Hypertrophied left ventricle with normal left ventricular systolic function. Mild left atrial enlargement  2. Severe aortic stenosis and moderate aortic regurgitation  3. Milt-to-moderate mitral regurgitation    DE: 2/15/21  -------------------------------------------------------------------  Conclusions     1. Left ventricle: Wall thickness was increased in a pattern of     moderate LVH. Systolic function was normal. The estimated     ejection fraction was 60-65%. 2. Aortic valve: The aortic valve is severely calcified and does     not open. This is suggestive of severe aortic stenosis. Recommend transthoracic study to measure gradient. There is     limited excursion or the valve. 3. Aorta: Aortic root is mildly enlarged at 3.9 cm transverse     diameter. 4. Mitral valve: There was mild regurgitation. 5. Left atrium: The LA is enlarged. There is no thrombi in the     atrial appendage. Echogenic spontaneious contrast or  quot;smoke quot;     seen in left atrium. No evidence of thrombus in the atrial     cavity or appendage. 6. Right atrium: The atrium was mildly dilated. 7. Atrial septum: No atrial septal defect was identified by color     flow Doppler.       Cardiac catheterization: 3/9/21  Conclusion       · Catheters used: 5 Fr Mt, JL 3.5 ([facility] would not engage the LM) diagnostic catheters  · Uncomplicated ultrasound-guided access of the RRA with successful hemostasis of the 5 Fr RRA access site using a TR band  · Heparin was used for procedural anticoagulation     1. Moderate 40-50% stenosis in the proximal/mid LAD just after the take-off of D1.     2. Minimal luminal irregularities otherwise in a right dominant coronary circulation with a small/moderate sized ramus intermedius artery.     3. Calcified aortic valve in keeping with known diagnosis of aortic stenosis.     4. Continue evaluation for TAVR.     5. Results discussed with the patient and the patient's wife.       Complications    Complications documented before study signed (3/9/2021  4:30 PM)     No complications were associated with this study. Documented by Josesito Garza MD - 3/9/2021  5:44 PM      Coronary Findings    Diagnostic  Dominance: Right  Left Main   The vessel was visualized by angiography. The vessel is angiographically normal.   Left Anterior Descending   Prox LAD to Mid LAD lesion, 45% stenosed. Ramus Intermedius   The vessel was visualized by angiography. The vessel is angiographically normal.   Left Circumflex   The vessel was visualized by angiography. The vessel exhibits minimal luminal irregularities. Right Coronary Artery   The vessel was visualized by angiography. The vessel exhibits minimal luminal irregularities. Intervention    No interventions have been documented. Carotid Dopplers: Ordered    PFTs: FEV1/Predicted:  None   Normal FEV1 > 1 Liter, Predicted = 100%, DLCO > 80%    Mild Lung Disease (60-70% Predicted)    Moderate Lung Disease (50-55% Predicted)    Severe Lung Disease (< 50% Predicted or PO2 < 60 or pCO2>50 on RA)    Gated C/A/P CTA: Completed 3/11/21    Physical Exam:  General: Well nourished well groomed Male appearing stated age  Neuro: A&OX3. ACEVEDO. PERRL. Steady unassisted gait  Head:Normocephalic. Atraumatic.  Symmetrical  Neck: Trachea Midline  Resp: CTA B. No Adv BS/cough/sputum/tachypnea with seated conversation  CV: S1S2 RRR. LESA III/VI. No JVD/carotid bruits. Pink/warm/dry extremities. No LE peripheral edema  GI:Benign ab. Soft. NT/ND. Active BS  : Voids  Integ: No obvious s/s of infection or breakdown  Musculo/Skeletal: FROM in all major joints. Good muscle tone    Clinic Evaluation:   KCCQ-12: scanned into EMR    5 meter gait: 6.2 seconds    Frality Survey:  May Lunch Index ADL - 6/6  scanned into EMR     STS 4.2 Risk Score / Predicted 30 day mortality: - calculations scanned into EMR  AVR  Mortality: 1.977%  Morbidity or Mortality: 14.184%    Assessment/Plan:     1. Aortic Stenosis severe and symptomatic with moderate AI: Low surgical risk. Plan for TAVR-work up pending. Carotids ordered. 2. GI Bleed/Anemia: On Venofer. Underwent EGD and Colonoscopy. GI following. On PPI. Received 4 units PRBCs. Continue to monitor. 3. Rectal mass found on Colonoscopy: Surgery consulted and will await recommendations. 4. Atrial fibrillation s/p Ablation in Feb 2021: Had been on Xarelto but now on hold due to GIB. On Amiodarone. Cardiology following. 5. HTN: Was on Lisinopril PTA. Not currently on antihypertensives. 6. HLD: On Statin    7. DM II: ON Metformin at home. On SSI here.       Further plan/care by Dr. Susan Pacheco and Dr. Nanette Zaragoza

## 2021-03-17 NOTE — PROGRESS NOTES
Yesica Vargas [address]  [address] [facility], [address]       GI PROGRESS NOTE  Zohaib Saeed, [facility] office  [phone] NP in-hospital cell phone M-F until 4:30  After 5pm or on weekends, please call  for physician on call      NAME: Kartik Holland   :  1945   MRN:  [id number]       Subjective:   He is feeling well, no complaints - no abdominal pain or nausea. Objective:     VITALS:   Last 24hrs VS reviewed since prior progress note. Most recent are:  Visit Vitals  /65 (BP 1 Location: Left upper arm, BP Patient Position: Supine)   Pulse 81   Temp 99.3 °F (37.4 °C)   Resp 18   Wt 80.2 kg (176 lb 11.2 oz)   SpO2 95%   BMI 23.96 kg/m²       PHYSICAL EXAM:  General: Cooperative, no acute distress    Neurologic:  Alert and oriented X 3. HEENT: EOMI, no scleral icterus   Lungs:  No increased WOB  Heart:  regular rate  Abdomen: Soft   Extremities: warm  Psych:   Good insight. Not anxious or agitated.     Lab Data Reviewed:     Recent Results (from the past 24 hour(s))   GLUCOSE, POC    Collection Time: 21  9:55 PM   Result Value Ref Range    Glucose (POC) 111 (H) 65 - 100 mg/dL    Performed by Buck Parker    HGB & HCT    Collection Time: 21  1:35 AM   Result Value Ref Range    HGB 7.6 (L) 12.1 - 17.0 g/dL    HCT 23.8 (L) 36.6 - 53.7 %   METABOLIC PANEL, BASIC    Collection Time: 21  1:35 AM   Result Value Ref Range    Sodium 137 136 - 145 mmol/L    Potassium 3.9 3.5 - 5.1 mmol/L    Chloride 109 (H) 97 - 108 mmol/L    CO2 21 21 - 32 mmol/L    Anion gap 7 5 - 15 mmol/L    Glucose 115 (H) 65 - 100 mg/dL    BUN 28 (H) 6 - 20 MG/DL    Creatinine 0.95 0.70 - 1.30 MG/DL    BUN/Creatinine ratio 29 (H) 12 - 20      GFR est AA >60 >60 ml/min/1.73m2    GFR est non-AA >60 >60 ml/min/1.73m2    Calcium 8.0 (L) 8.5 - 10.1 MG/DL   GLUCOSE, POC    Collection Time: 21  6:14 AM   Result Value Ref Range    Glucose (POC) 120 (H) 65 - 100 mg/dL    Performed by Chanelleobdulia Donniesilvia             Assessment:   · 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 Pathology pending. Appreciate Surgery team's assistance. We will be available as needed. Please call with any questions. Migue Truong MD

## 2021-03-17 NOTE — PROGRESS NOTES
Patient keen on discharge, I spoke to patient, was off [facility] for last few days, now that his HB is stable, will restart his [facility] 'based on cardiology recommendation- changes to Eliquis 2.5 BID'. Starting this evening, if his HB remains stable and doesn't get any more GI bleed by tomorrow midday will let him be discharged. Patient understands and agrees with plan. Got call from Cardiology, change of plan given colonoscopy findings, will dc ACs completely. No plans for further AC on dc. Patient already got a small dose 2.5mg of eliquis. Will dc further AC. If HB stable tomorrow morning will be ready for dc home off ACs.

## 2021-03-17 NOTE — PROGRESS NOTES
Bedside shift change report given to RACHELE Scott and Nahum SN (oncoming nurse) by Teetee Hale RN (offgoing nurse). Report included the following information SBAR, Kardex, Intake/Output, MAR and Recent Results.

## 2021-03-17 NOTE — WOUND CARE
Patient dressing for discharge. He reports being followed by podiatry for the wound on his right foot and has an appt in 2 days.   
RAVINDER Espino

## 2021-03-18 ENCOUNTER — TELEPHONE (OUTPATIENT)
Dept: CARDIOLOGY CLINIC | Age: 76
End: 2021-03-18

## 2021-03-18 VITALS
SYSTOLIC BLOOD PRESSURE: 110 MMHG | WEIGHT: 176.7 LBS | TEMPERATURE: 98.8 F | HEIGHT: 72 IN | BODY MASS INDEX: 23.93 KG/M2 | OXYGEN SATURATION: 95 % | HEART RATE: 99 BPM | DIASTOLIC BLOOD PRESSURE: 43 MMHG | RESPIRATION RATE: 20 BRPM

## 2021-03-18 LAB
CEA SERPL-MCNC: 1.8 NG/ML
GLUCOSE BLD STRIP.AUTO-MCNC: 101 MG/DL (ref 65–100)
GLUCOSE BLD STRIP.AUTO-MCNC: 169 MG/DL (ref 65–100)
HCT VFR BLD AUTO: 23.1 % (ref 36.6–50.3)
HCT VFR BLD AUTO: 25.8 % (ref 36.6–50.3)
HGB BLD-MCNC: 7.4 G/DL (ref 12.1–17)
HGB BLD-MCNC: 8.1 G/DL (ref 12.1–17)
SARS-COV-2, COV2: NORMAL
SERVICE CMNT-IMP: ABNORMAL
SERVICE CMNT-IMP: ABNORMAL

## 2021-03-18 PROCEDURE — 74011250637 HC RX REV CODE- 250/637: Performed by: NURSE PRACTITIONER

## 2021-03-18 PROCEDURE — 82962 GLUCOSE BLOOD TEST: CPT

## 2021-03-18 PROCEDURE — 74011250637 HC RX REV CODE- 250/637: Performed by: FAMILY MEDICINE

## 2021-03-18 PROCEDURE — 36415 COLL VENOUS BLD VENIPUNCTURE: CPT

## 2021-03-18 PROCEDURE — U0005 INFEC AGEN DETEC AMPLI PROBE: HCPCS

## 2021-03-18 PROCEDURE — 82378 CARCINOEMBRYONIC ANTIGEN: CPT

## 2021-03-18 PROCEDURE — 74011250636 HC RX REV CODE- 250/636: Performed by: INTERNAL MEDICINE

## 2021-03-18 PROCEDURE — 85014 HEMATOCRIT: CPT

## 2021-03-18 PROCEDURE — 74011250637 HC RX REV CODE- 250/637: Performed by: INTERNAL MEDICINE

## 2021-03-18 PROCEDURE — 74011000258 HC RX REV CODE- 258: Performed by: INTERNAL MEDICINE

## 2021-03-18 RX ORDER — TRAMADOL HYDROCHLORIDE 50 MG/1
50 TABLET ORAL
Status: DISCONTINUED | OUTPATIENT
Start: 2021-03-18 | End: 2021-03-18 | Stop reason: HOSPADM

## 2021-03-18 RX ORDER — TRAMADOL HYDROCHLORIDE 50 MG/1
50 TABLET ORAL
Qty: 12 TAB | Refills: 0 | Status: SHIPPED | OUTPATIENT
Start: 2021-03-18 | End: 2021-03-21

## 2021-03-18 RX ADMIN — Medication 10 ML: at 06:14

## 2021-03-18 RX ADMIN — PANTOPRAZOLE SODIUM 40 MG: 40 TABLET, DELAYED RELEASE ORAL at 06:23

## 2021-03-18 RX ADMIN — ACETAMINOPHEN 650 MG: 325 TABLET ORAL at 09:33

## 2021-03-18 RX ADMIN — ACETAMINOPHEN 650 MG: 325 TABLET ORAL at 01:24

## 2021-03-18 RX ADMIN — IRON SUCROSE 200 MG: 20 INJECTION, SOLUTION INTRAVENOUS at 06:14

## 2021-03-18 RX ADMIN — AMIODARONE HYDROCHLORIDE 200 MG: 200 TABLET ORAL at 09:33

## 2021-03-18 NOTE — PROGRESS NOTES
Clinical Update:    [facility]    Tentative plan for TAVR with Sebastian Carrero on 3/24/21. Will get Preoperative Screening COVID testing today then patient to quarantine at home between discharge and surgery.      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 (02/02/20 1130)    OBJECTIVE:  Precautions  Other Precautions: . (02/02/20 1130)  Precautions Comments: abdominal precautions (01/30/20 0638)    See below for current functional status overview. See OT flowsheet for full details regarding the OT therapy provided. ASSESSMENT:   Emphasis of session was on functional transfers/ambulation without assistive device and item retrieval/transport from various surfaces with education on fall prevention strategies. Pt with fair tolerance of session secondary to fatigue. Overall performance is at supervision to [address] [place] [address] due to decreased activity tolerance, balance deficits, weakness and safety. Continue OT per POC to maximize functional performance. OT Identified Barriers to Discharge: safety, balance, weakness, activity tolerance      This patient participated in all scheduled occupational therapy time with this therapist today. EDUCATION:   On this date, education was provided to patient regarding  household mobility and fall prevention  The response to education was/were: Needs reinforcement    PLAN:   Continue skilled OT, including the following Treatment Interventions: ADL retraining;Functional transfer training;UE strengthening/ROM; Endurance training;Cognitive reorientation;Patient/Family training;Equipment eval/education; Neuro muscular reeducation;Continued evaluation (02/02/20 1200)   OT Frequency: 7 days/week (02/02/20 1200), Frequency Comments: Modified program (02/02/20 1200)    Treatment Plan for Next Session: AM: ADLs including shower, progress standing tolerance, energy conservation education  Additional Plan Considerations: PM: standing balance/tolerance with reaching tasks, general activity tolerance        RECOMMENDATIONS FOR DISCHARGE:  Recommendations for Discharge: OT WI: 24 Hour assist    PT/OT Mobility Equipment for Discharge: order for 2ww placed 1/29 (02/02/20 1200)  PT/OT ADL Equipment for Discharge: discussed Mayra Bustaamnte (spouse) getting tub transfer bench, hand held shower head, safety frame around toilet, and taking off shower doors on tub shower. walk in shower not large enough to support shower chair (02/02/20 1200)      FUNCTIONAL DATA OVERVIEW LAST 24 HOURS  ADLs        Household mobility  Household Mobility  Sit to Stand: Modified Independent (02/02/20 1200)  Stand to Sit: Modified Independent (02/02/20 1200)  Transfer Equipment: gait belt, no assistive device  (02/02/20 1200)  Sitting - Static: Independent (02/02/20 1200)  Sitting - Dynamic: Modified Independent (02/02/20 1200)  Standing - Static: Supervision (02/02/20 1200)  Standing - Dynamic: Supervision (02/02/20 1200)  Household Mobility Comments #1: Pt completed functional ambulation in room and ADL apt with close suprv to light steadying assist at end of session due to fatigue, no LOB. No assistive device used. (02/02/20 1200)    Home Management       Tolerance  OT Activity Tolerance  Activity Tolerance: 1:1 Activity to rest (02/02/20 1130)  Activity Tolerance Comments: fair  (02/02/20 1130)    Cognition  Communication/Cognition  Communication: Clear speech (02/02/20 1200)  Overall Cognitive Status: Impaired (02/02/20 1200)  Arousal/Alertness: Appropriate responses to stimuli (02/02/20 1200)  Attention: Impaired divided attention; Impaired alternating attention (02/02/20 1200)  Executive Functions: Impaired self monitoring/self awareness (02/02/20 1200)  Following Verbal Directions: Follows multistep directions with increased time (02/02/20 1200)  Following Demonstrated Directions:  Follows multistep directions with increased time (02/02/20 1200)    Interventions  Other Interventions 1: Pt completed functional transfers in ADL apt with close suprv to light steadying assist with rocking chair. No formal LOB. Rest breaks needed due to weakness and decreased activity tolerance. (02/02/20 1200)  Other Interventions 2: Pt completed item retrieval from various surfaces in kitchen environment and item transport with education on fall prevention strategies. Suprv for safety with item retrieval, light steadying assist with ambulation while carrying items.   (02/02/20 1200)

## 2021-03-18 NOTE — PROGRESS NOTES
[facility] Cardiology Progress Note    Date of Service: 3/17/2021    Subjective:  Discussed results of colonoscopy with Dr. Stephania Barrett (GI). Hgb stable.       Objective:    Visit Vitals  /68 (BP 1 Location: Left upper arm, BP Patient Position: Sitting)   Pulse 81   Temp 98.9 °F (37.2 °C)   Resp 16   Ht 6' 0.05\" (1.83 m)   Wt 80.2 kg (176 lb 11.2 oz)   SpO2 99%   BMI 23.93 kg/m²         Intake/Output Summary (Last 24 hours) at 3/17/2021 2015  Last data filed at 3/17/2021 0900  Gross per 24 hour   Intake    Output 200 ml   Net -200 ml        Physical Exam  GEN: NAD, appears stated age  HEENT: EOMI, MMM, OP clear  NECK: Normal JVP  CV: RRR, normal S1, soft S2, harsh III/VI late-peaking systolic murmur at LUSB, no rubs or gallops  LUNGS: CTAB, no W/R/R  ABD: NABS, soft, NT/ND  EXT: No edema, 2+ distal pulses  PSYCH: Mood and affect normal  NEURO: AAO, MAEW, face symmetrical, speech intact    Current Facility-Administered Medications   Medication Dose Route Frequency    acetaminophen (TYLENOL) tablet 650 mg  650 mg Oral Q6H PRN    [START ON 3/18/2021] pantoprazole (PROTONIX) tablet 40 mg  40 mg Oral ACB    sodium chloride (NS) flush 5-40 mL  5-40 mL IntraVENous Q8H    sodium chloride (NS) flush 5-40 mL  5-40 mL IntraVENous PRN    iron sucrose (VENOFER) 200 mg in 0.9% sodium chloride 100 mL IVPB  200 mg IntraVENous Q24H    0.9% sodium chloride infusion 250 mL  250 mL IntraVENous PRN    insulin lispro (HUMALOG) injection   SubCUTAneous AC&HS    glucose chewable tablet 16 g  4 Tab Oral PRN    dextrose (D50W) injection syrg 12.5-25 g  12.5-25 g IntraVENous PRN    glucagon (GLUCAGEN) injection 1 mg  1 mg IntraMUSCular PRN    0.9% sodium chloride infusion 250 mL  250 mL IntraVENous PRN    0.9% sodium chloride infusion 250 mL  250 mL IntraVENous PRN    0.9% sodium chloride infusion 250 mL  250 mL IntraVENous PRN    amiodarone (CORDARONE) tablet 200 mg  200 mg Oral DAILY    rosuvastatin (CRESTOR) tablet 10 mg  10 mg Oral QHS    sodium chloride (NS) flush 5-40 mL  5-40 mL IntraVENous Q8H    sodium chloride (NS) flush 5-40 mL  5-40 mL IntraVENous PRN       Data Reviewed:  Recent Labs     03/17/21  0135 03/16/21  0516 03/15/21  0940    141 139   K 3.9 3.9 3.8   * 112* 112*   CO2 21 19* 22   * 117* 125*   BUN 28* 31* 37*   CREA 0.95 0.92 0.91   CA 8.0* 8.3* 8.1*   MG  --  2.2  --    PHOS  --  3.2  --    ALB  --   --  2.8*   ALT  --   --  18   INR  --   --  1.2*     Recent Labs     03/17/21  1347 03/17/21  0135 03/16/21  0516 03/16/21  0008 03/15/21  0940 03/15/21  0940   WBC  --   --  6.6 7.3  --  4.1   HGB 8.2* 7.6* 7.0* 7.7*   < > 6.0*   HCT 25.3* 23.8* 21.8* 24.2*  --  19.1*   PLT  --   --  101* 108*  --  94*    < > = values in this interval not displayed. Lab Results   Component Value Date/Time    Specimen Description: NARES 07/18/2009 08:10 PM     Lab Results   Component Value Date/Time    Culture result:  07/18/2009 08:10 PM     MRSA NOT PRESENT      Screening of patient nares for MRSA is for surveillance purposes and, if positive, to facilitate isolation considerations in high risk settings. It is not intended for automatic decolonization interventions per se as regimens are not sufficiently effective to warrant routine use. All Cardiac Markers in the last 24 hours:    No results found for: CPK, CK, CKMMB, CKMB, RCK3, CKMBT, CKMBPOC, CKNDX, CKND1, ARTI, TROPT, TROIQ, RAMÍREZ, TROPT, TNIPOC, BNP, BNPP, BNPNT    Telemetry (personally reviewed): normal sinus rhythm    Assessment:  1. 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 NAFLD; TAVR CT with findings suggestive of possible cirrhosis, however, RUQ U/S shows no evidence of cirrhosis  3. Symptomatic, severe AS  4. HTN  5. AF/AFL s/p recent ablation by Dr. Daily Chou; maintaining sinus rhythm  6. Rectosigmoid mass s/p biopsy  7.  Multiple colonic polyps s/p polypectomy    Plan:  - Hgb improved after PRBC transfusion; goal Hgb > 8 given symptomatic severe aortic stenosis  - Continue amiodarone until TAVR and planned laparascopic sigmoid colectomy/LAR are completed  - Agree with PPI; will defer potential need for oral PPI to GI  - Continue rosuvastatin  - BP normal off of lisinopril; hold on discharge  - Given rectosigmoid mass and recent polypectomy; do NOT give any antiplatelet or anticoagulants; will not restart anticoagulation at this time (has maintained sinus rhythm and CHADS-VASc score is low and risks of bleeding with rectosigmoid mass and after polypectomy outweigh benefits)  - Continue plans for possible TAVR next Wednesday, 3/24/21  - Discussed with Dr. Terri Holt (GI), Dr. Magaly Gonzalez (hospitalist) and Dr. Eh Damon (surgery)    Agree with discharge tomorrow if Hgb is stable. Thank you for the opportunity to participate in the care of Tana Patches and please do not hesitate to contact us should you have any questions. Signed:  Melissa Patel.  [facility]o VillanuevaM [facility] / [facility]  03/17/21

## 2021-03-18 NOTE — TELEPHONE ENCOUNTER
Will this pt be discharge? I wanted to get them on the books for H&P for tomorrow if going home. Can you call me when you can.  Thanks

## 2021-03-18 NOTE — DISCHARGE SUMMARY
Discharge Summary       PATIENT ID: Aroldo Claire  MRN: [id number]   YOB: 1945    DATE OF ADMISSION: 3/14/2021  6:18 PM    DATE OF DISCHARGE: 3/18/2021   PRIMARY CARE PROVIDER: Sandra Barlow MD     ATTENDING PHYSICIAN: Shani Martinez MD  DISCHARGING PROVIDER: Shani Martinez MD    To contact this individual call [phone] and ask the  to page. If unavailable ask to be transferred the Adult Hospitalist Department. CONSULTATIONS: IP CONSULT TO GASTROENTEROLOGY  IP CONSULT TO CARDIOLOGY  IP CONSULT TO GENERAL SURGERY    PROCEDURES/SURGERIES: Procedure(s):  COLONOSCOPY  INJECTION  ENDOSCOPIC POLYPECTOMY  COLON BIOPSY  RESOLUTION CLIP    ADMITTING DIAGNOSES & HOSPITAL COURSE:   Evaluated and treated for GI bleeding, hx of Afib was on Anticoagulation- stopped on dc. Diagnosed with rectal mass, colon polyps removed. F/u with surgery for rectal mass treatment. And f/u with cardiology/cardiac surgery for planned TAVR for severe AS. Risk of Re-Admission: mod  DISCHARGE DIAGNOSES / PLAN:      #. Dyspnea on exertion; caused by acute anemia due to GI bleeding.  - initial Hb 4.2; s/p 2 unit of PRBC. Monitor hb and transfuse for <8 given severe AS   - GI following, s/p EGD 3/16: no clear bleeding source. - colonoscopy 3/16: polypoid mass in rectum 'biopsies pending'. Multiple colon polyps. - Surgery evaluated- plans for op close follow up for resection of rectosigmoid.     2. Atrial fibrillation; s/p ablation. continue amiodarone. Dc xarelto and aspirin. 3. Aortic stenosis; [facility] 3/9/2021. Plan for TAVR on 3/24.  Cardiology following. 4. History of 2 cm colonic lesion; GI evaluating. F/u op. #. R knee pain: some effusion, says he had OA, and gets flare ups with effusion, offered ortho eval, he prefers to f/u with his ortho doctor op. He scheduled appt for tomorrow.      FOLLOW UP APPOINTMENTS:    Follow-up Information     Follow up With Specialties Details Why Contact Nav Rider MD ANITRA General Surgery, Surgical Oncology, Colon and Rectal Surgery Schedule an appointment as soon as possible for a visit in 2 weeks To discuss colon surgery [address] [facility]  [address] [facility]      Elliot Langford MD Cardiothoracic Surgery In 6 days  [address] [facility], [address]   [address] [address]  [phone]           ADDITIONAL CARE RECOMMENDATIONS:  Follow up with PCP , cardiology and general surgery    DIET: Resume previous diet    ACTIVITY: Activity as tolerated    DISCHARGE MEDICATIONS:  Current Discharge Medication List      START taking these medications    Details   Ferrous Fumarate 325 mg (106 mg iron) tab Take 1 Tab by mouth two (2) times a day for 30 days. Qty: 60 Tab, Refills: 0      traMADoL (ULTRAM) 50 mg tablet Take 1 Tab by mouth every six (6) hours as needed for Pain for up to 3 days. Max Daily Amount: 200 mg. Qty: 12 Tab, Refills: 0    Associated Diagnoses: Acute pain of right knee         CONTINUE these medications which have NOT CHANGED    Details   amiodarone (CORDARONE) 200 mg tablet Take 200 mg by mouth.      metFORMIN (GLUCOPHAGE) 500 mg tablet Take 500 mg by mouth daily (with breakfast). multivitamin (ONE A DAY) tablet Take 1 Tab by mouth daily. pantoprazole (PROTONIX) 40 mg tablet Take 40 mg by mouth daily. rosuvastatin (CRESTOR) 10 mg tablet Take 1 Tab by mouth nightly. Indications: hardening of the arteries due to plaque buildup  Qty: 90 Tab, Refills: 4         STOP taking these medications       lisinopriL (PRINIVIL, ZESTRIL) 40 mg tablet Comments:   Reason for Stopping:         rivaroxaban (Xarelto) 20 mg tab tablet Comments:   Reason for Stopping:         aspirin delayed-release 81 mg tablet Comments:   Reason for Stopping:             NOTIFY YOUR PHYSICIAN FOR ANY OF THE FOLLOWING:   Fever over 101 degrees for 24 hours.    Chest pain, shortness of breath, fever, chills, nausea, vomiting, diarrhea, change in mentation, falling, weakness, bleeding. Severe pain or pain not relieved by medications. Or, any other signs or symptoms that you may have questions about. DISPOSITION:    Home With:   OT  PT  HH  RN       Long term SNF/Inpatient Rehab   x Independent/assisted living    Hospice    Other:     PATIENT CONDITION AT DISCHARGE:     Functional status    Poor     Deconditioned     Independent      Cognition     Lucid     Forgetful     Dementia      Catheters/lines (plus indication)    [place]     PICC     PEG     None      Code status     Full code     DNR      PHYSICAL EXAMINATION AT DISCHARGE:  Patient seen and examined at bedside, Condition stable, explained discharge and follow up plans. BP (!) 110/43 (BP 1 Location: Left upper arm, BP Patient Position: Sitting)   Pulse 99   Temp 98.8 °F (37.1 °C)   Resp 20   Ht 6' 0.05\" (1.83 m)   Wt 80.2 kg (176 lb 11.2 oz)   SpO2 95%   BMI 23.93 kg/m²   General:  Alert, oriented, No acute distress. Keen on dc. Resp:  No accessory muscle use, Good AE. Neuro:  Grossly normal, no focal neuro deficits, follows commands     CHRONIC MEDICAL DIAGNOSES:  Problem List as of 3/18/2021 Never Reviewed          Codes Class Noted - Resolved    GIB (gastrointestinal bleeding) ICD-10-CM: K92.2  ICD-9-CM: 578.9  3/14/2021 - Present              37 minutes were spent with the patient on counseling and coordination of care.     Signed:   Christophe Schroeder MD  3/18/2021  7:32 AM

## 2021-03-18 NOTE — DISCHARGE INSTRUCTIONS
Discharge Instructions       PATIENT ID: Taye Mendoza  MRN: [id number]   YOB: 1945    DATE OF ADMISSION: 3/14/2021  6:18 PM    DATE OF DISCHARGE: 3/18/2021    PRIMARY CARE PROVIDER: Ray Mancia MD     ATTENDING PHYSICIAN: Theodore Maharaj MD  DISCHARGING PROVIDER: Annabelle Fernandez MD    To contact this individual call [phone] and ask the  to page. If unavailable ask to be transferred the Adult Hospitalist Department. DISCHARGE DIAGNOSES GI bleed, rectal mass, colonic polyps, Severe Aortic stenosis    CONSULTATIONS: IP CONSULT TO GASTROENTEROLOGY  IP CONSULT TO CARDIOLOGY  IP CONSULT TO GENERAL SURGERY    PROCEDURES/SURGERIES: Procedure(s):  COLONOSCOPY  INJECTION  ENDOSCOPIC POLYPECTOMY  COLON BIOPSY  RESOLUTION CLIP    FOLLOW UP APPOINTMENTS:   Follow-up Information     Follow up With Specialties Details Why Contact Info    Get To MD General Surgery, Surgical Oncology, Colon and Rectal Surgery Schedule an appointment as soon as possible for a visit in 2 weeks To discuss colon surgery [address] [place] [address]      Deepthi Samuel MD Cardiothoracic Surgery In 6 days  [address] [facility] [phone]      Ray Mancia, [address] [address]  [phone]             ADDITIONAL CARE RECOMMENDATIONS: follow up with PCP and cardiology/Cardiac surgery. surgery    DIET: Resume previous diet    DISCHARGE MEDICATIONS:  Iron tablets. Dc xarelto/aspirin/lisinopril    · It is important that you take the medication exactly as they are prescribed. · Keep your medication in the bottles provided by the pharmacist and keep a list of the medication names, dosages, and times to be taken in your wallet. · Do not take other medications without consulting your doctor. NOTIFY YOUR PHYSICIAN FOR ANY OF THE FOLLOWING:   Fever over 101 degrees for 24 hours.    Chest pain, shortness of breath, fever, chills, nausea, vomiting, diarrhea, change in mentation, falling, weakness, bleeding. Severe pain or pain not relieved by medications. Or, any other signs or symptoms that you may have questions about. It was our pleasure to help take care of you and we hope you get well very soon.     DISPOSITION:    Home With:   OT  PT  HH  RN       SNF/Inpatient Rehab/LTAC   x Independent/assisted living    Hospice    Other:     CDMP Checked:   Yes x     PROBLEM LIST Updated:  Yes x     Signed:   Oziel Bentley MD  3/18/2021  7:57 AM

## 2021-03-18 NOTE — PROGRESS NOTES
Bedside shift change report given to linnea (oncoming nurse) by Victor Hugo Webster (offgoing nurse). Report included the following information SBAR, Kardex and Intake/Output.

## 2021-03-18 NOTE — PROGRESS NOTES
NÉSTOR:  1. RUR-18%  2. Admitted from home, plan to return on discharge. 3. His wife will transport home.        Yves Primrose, [facility]

## 2021-03-18 NOTE — PROGRESS NOTES
Bedside shift change report given to Freddy Ramírez RN (oncoming nurse) by Elio Baumann RN (offgoing nurse). Report included the following information SBAR, Kardex, MAR and Recent Results.

## 2021-03-19 ENCOUNTER — PATIENT OUTREACH (OUTPATIENT)
Dept: CASE MANAGEMENT | Age: 76
End: 2021-03-19

## 2021-03-19 LAB
SARS-COV-2, XPLCVT: NOT DETECTED
SOURCE, COVRS: NORMAL

## 2021-03-19 NOTE — PROGRESS NOTES
Outreach made within 2 business days of discharge: Yes    Care Transition Nurse contacted the patient by telephone to perform post discharge assessment. Verified name and  with patient as identifiers. ([address] hosp follow up). Advance Care Planning:   Does patient have an Advance Directive: [employer] on file    START taking these medications     Details   Ferrous Fumarate 325 mg (106 mg iron) tab Take 1 Tab by mouth two (2) times a day for 30 days. Qty: 60 Tab, Refills: 0       traMADoL (ULTRAM) 50 mg tablet Take 1 Tab by mouth every six (6) hours as needed for Pain for up to 3 days. Max Daily Amount: 200 mg.   Qty: 12 Tab, Refills: 0     Associated Diagnoses: Acute pain of right knee     STOP taking these medications         lisinopriL (PRINIVIL, ZESTRIL) 40 mg tablet Comments:   Reason for Stopping:            rivaroxaban (Xarelto) 20 mg tab tablet Comments:   Reason for Stopping:            aspirin delayed-release 81 mg tablet Comments:   Reason for Stopping:       FOLLOW UPS:  Chidi Watson MD    [phone] General Surgery, Surgical Oncology, Colon and Rectal Surgery Schedule an appointment as soon as possible for a visit in 2 weeks     Abe David MD Cardiothoracic Surgery  [phone] In 6 days     Jackson TinajeroCHRISTUS St[facility], [address]    [phone]

## 2021-03-19 NOTE — TELEPHONE ENCOUNTER
Pt's wife would like a call back  [phone]. She wants a HAND written letter on what he should stop taking medication.

## 2021-03-19 NOTE — ADT AUTH CERT NOTES
Gastrointestinal Bleeding, Lower - Care Day 4 (3/17/2021) by Florrie Bernheim, RN 
 
  
Review Status Review Entered Completed 3/18/2021 10:48  
  
Criteria Review Care Day: 4 Care Date: 3/17/2021 Level of Care: Telemetry Guideline Day 3 Level Of Care (X) Floor to discharge 3/18/2021 10:48:01 EDT by Shasha Marks   
  ip telemetry Clinical Status   
(X) * Hemodynamic stability 3/18/2021 10:48:01 EDT by Zeny Bingham   
  99.3 81 125/55 18 97% room air   
(X) * Stable Hgb/Hct   
3/18/2021 10:48:01 EDT by Zeny Villagran   
  HGB: 7.6 (L) HCT: 23.8 (L) 
transfuse venofer iv   
(X) * Bleeding absent or minimal   
3/18/2021 10:48:01 EDT by Zeny Bingham   
  stable   
(X) * Surgical and other acute interventions not needed 3/18/2021 10:48:01 EDT by Quarles Hives awaiting pathology report on rectal mass will need colon resection   
(X) * Pain absent or managed 3/18/2021 10:48:01 EDT by Robina Villar absent   
( ) * Discharge plans and education understood Activity   
(X) * Ambulatory or acceptable for next level of care 3/18/2021 10:48:01 EDT by Shasha Marks   
  ad xu Routes   
(X) * Oral hydration, medications, and diet 3/18/2021 10:48:01 EDT by Shasha Marks   
  cardiac diet * Milestone Additional Notes 3/17/2021 LOC IP TELEMETRY  
VS  99.3 81 125/55 18 97% room air LABS     
HGB: 7.6 (L) HCT: 23.8 (L) Sodium: 137 Potassium: 3.9 Chloride: 109 (H)  
CO2: 21 Anion gap: 7 Glucose: 115 (H) BUN: 28 (H) Creatinine: 0.95  
BUN/Creatinine ratio: 29 (H) Calcium: 8.0 (L)  
3/17/2021 13:47 HGB: 8.2 (L) HCT: 25.3 (L) BILATERAL CAROTID DOPPLER  Right Carotid The right CCA is patent. There is mild stenosis in the right ICA (<50%) and at the proximal segment. The right ICA has mild and heterogeneous plaque. There is mild stenosis in the right ECA.  The right ECA has mild and calcific plaque. The right vertebral is antegrade. Left Carotid The left CCA is patent. There is intimal thickening present in the left CCA. There is minimal stenosis in the left ICA. The left ICA has mixed density plaque. The left ECA is patent. The left vertebral is antegrade. MEDS Tylenol 650mg po q6 prn x3 Cordarone 200mg po qday Crestor 10mg po qhs  
Venofer 200mg iv q24 Eliquis 2.5mg po bid Protonix 40mg iv q12 ATTENDING NOTE Interval history / Subjective:  
Patient seen and examined at bedside, feels ok, got R knee effusion, causing little pain, improving with tylenol. Not keen on ortho eval now. Had same swelling/pain before, f/u op with ortho. Got 1 unit of RBC last night, HB 7.6 this am, he feels no sob walking to bathroom. Getting iron iv today. Edel Minor on dc if possible.  
   
Assessment & Plan:  
   
#. Dyspnea on exertion; acute anemia due to GI bleeding.  
- initial Hb 4.2; s/p 2 unit of PRBC. Monitor hb and transfuse for <8 given severe AS   
- GI following, s/p EGD 3/16: no clear bleeding source. - colonoscopy 3/16: polypoid mass in rectum 'biopsies pending'. Multiple colon polyps. - Surgery consult pending.  
   
2. Atrial fibrillation; s/p ablation. Hold Xarelto continue amiodarone 3. Aortic stenosis; [facility] 3/9/2021. Plan for TAVR on 3/24.  Cardiology following. 4. History of 2 cm colonic lesion; GI evaluating. F/u op.  
   
General:  Alert, oriented, No acute distress, pale. Pleasant WM Card:  S1, S2 systolic murmur, good peripheral perfusion Resp:  No accessory muscle use, no wheezes, no crepitations Abd:  Soft, non-tender, non-distended Extremities:  No cyanosis or clubbing, no significant edema Neuro:  Grossly normal, no focal neuro deficits, follows commands Psych:  Good insight, not agitated. CARDIOTHORACIC SURGERY NOTE  
70 y.o.   male with PMHx of Aortic Stenosis, Atrial Fibrillation with recent Ablation on Xarelto, GI Bleed, HTN, Colon polyps, DM II, that is referred to the [address] [place] [address] by Dr. Donis Pastor for interventional evaluation of  Aortic Stenosis.  
   
The patient is currently admitted for treatment and work up of GI Bleed with Heme + stool and with Hgb of 4.2 on admit. He received 4 units of PRBCs on this admission. His Xarelto was placed on hold and has underwent an EGD and Colonoscopy. On Colonoscopy he was found to have a 4-5 cm mass which was biopsied-results pending and multiple colon polyps.  General Surgery has consulted for rectal mass and awaiting surgical recommendations from Dr. Mian Mina.  
   
Prior to this acute episode, the patient reports that he used to go to the gym routinely for exercise. Since COVID, however, he has remained at home and felt that his decreased activity was a product of his reduction in exercise. 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 He takes his dog for walks of about a mile and has found that he has gotten more tired with over the last several months. He denies worsening fatigue, palpitation, chest pain, syncope or falls, orthopnea, PND, LE edema. He did note some dizziness but only recently. He started Xarelto over the last month due to new diagnosis of atrial fibrillation and subsequent ablation. He said that he has never noticed blood in his stool.  
   
Still working full time as a Banker. Lives with his wife in a house. His wife is able to assist following surgery. Assessment/Plan:   
   
1. Aortic Stenosis severe and symptomatic with moderate AI: Low surgical risk. Plan for TAVR-work up pending. Carotids ordered.  
   
2. GI Bleed/Anemia: On Venofer. Underwent EGD and Colonoscopy. GI following. On PPI. Received 4 units PRBCs. Continue to monitor.  
   
3. Rectal mass found on Colonoscopy: Surgery consulted and will await recommendations.  
   
4. Atrial fibrillation s/p Ablation in Feb 2021: Had been on Xarelto but now on hold due to GIB.  On Amiodarone. Cardiology following.  
   
5. HTN: Was on Lisinopril PTA. Not currently on antihypertensives.  
   
6. HLD: On Statin  
   
7. DM II: ON Metformin at home. On SSI here. IMPRESSION:  
1.  Severe symptomatic aortic stenosis. 2.  Chronic systolic congestive heart failure. 3.  Acute blood loss anemia, probably related to a combination of anticoagulation and gastrointestinal polyps.  
   
 PLAN:  I discussed with him the followup needed.  As long as he continues to be stable with no overt blood loss, I think it is appropriate to proceed with his transcatheter aortic valve replacement.  He is aware of the indications and risks. GASTROENTEROLOGY NOTE Subjective: He is feeling well, no complaints - no abdominal pain or nausea.  
 Assessment:  
· Rectosigmoid mass 4-5cm mass biopsied and tattooed · Anemia: EGD 3/15 mild patchy erythema antrum and duodenal bulb; colonoscopy 3/16/21: rectal polypoid mass, multiple colon polyps; hgb 7.6 · Atrial fibrillation/flutter: recent ablation, on Xarelto Plan:  
· Hold anticoagulation as able · Appreciate surgery consult · Will follow peripherally, please call with any questions or concerns Pathology pending. Appreciate Surgery team's assistance GENERAL SURGERY NOTE Reason for Consultation: GI bleeding/rectal mass  
 Cont BID PPI  
 have independently examined the patient and have reviewed the chart.  I agree with the above plan.  Patient with recto-sigmoid mass noted on virtual colonoscopy. Lauraine Holter has been in the hospital due to severe anemia but notes no signs of gross blood that he has noted in his stool.  EGD unremarkable.  Colonoscopy showed a 4-5 cm polypoid mass in the recto-sigmoid junction at 19 cm from anal verge.  This was biposied and tattooed distally.  Additional polyps were removed with snare polypectomy.  Pathology pending.  We discussed that it would be safer to have his TAVR prior to undergoing colectomy but this will result in a delay of doing his colon surgery due to needing the valve to incorporate to reduce risk of infection.  If his pathology shows malignancy, he will need a CT C/A/P for staging and CEA checked.  I will plan to see him back as outpatient but am ok with him proceeding with TAVR as planned and we can address colon afterwards. Aubrey Sheets discuss with Cardiology/Cardiac Surgery team regarding when he would be ok to undergo colon surgery afterwards. [facility] would need a laparoscopic sigmoid colectomy/LAR.    
  
  
UPDATED ATTENDING PROGRESS NOTE 1700  
 have independently examined the patient and have reviewed the chart.  I agree with the above plan.  Patient with recto-sigmoid mass noted on virtual colonoscopy. [facility] has been in the hospital due to severe anemia but notes no signs of gross blood that he has noted in his stool.  EGD unremarkable.  Colonoscopy showed a 4-5 cm polypoid mass in the recto-sigmoid junction at 19 cm from anal verge.  This was biposied and tattooed distally.  Additional polyps were removed with snare polypectomy.  Pathology pending.  We discussed that it would be safer to have his TAVR prior to undergoing colectomy but this will result in a delay of doing his colon surgery due to needing the valve to incorporate to reduce risk of infection.  If his pathology shows malignancy, he will need a CT C/A/P for staging and CEA checked.  I will plan to see him back as outpatient but am ok with him proceeding with TAVR as planned and we can address colon afterwards. Aubrey Sheets discuss with Cardiology/Cardiac Surgery team regarding when he would be ok to undergo colon surgery afterwards. [facility] would need a laparoscopic sigmoid colectomy/LAR.    
  
  
Gastrointestinal Bleeding, Lower - Care Day 3 (3/16/2021) by Arden Cabezas, RN 
 
  
Review Status Review Entered Completed 3/18/2021 10:36  
  
Criteria Review Care Day: 3 Care Date: 3/16/2021 Level of Care: Telemetry Guideline Day 2 Level Of Care (X) Floor 3/18/2021 10:36:48 EDT by Estelle Farias Clinical Status   
(X) * Hypotension absent 3/18/2021 10:36:48 EDT by Zeny Simmons   
  /57   
(X) * Bleeding absent or reduced 3/18/2021 10:36:48 EDT by Zeny Simmons   
  HGB: 7.0 (L) HCT: 21.8 (L) 
improving transfuse 1 unit PRBC today Activity (X) Activity as tolerated 3/18/2021 10:36:48 EDT by Abraham Morris as tolerated Routes   
(X) * Oral hydration and diet tolerated 3/18/2021 10:36:48 EDT by Joan Gregg   
  cardiac diet after procedure Interventions   
(X) Hgb/Hct * Milestone Additional Notes 3/16/2021 LOC IP TELEMETRY  
VS  98.7 100 117/57 18 99% ROOM AIR  
LABS    
RBC: 2.43 (L) HGB: 7.0 (L) HCT: 21.8 (L) MCV: 89.7 MCH: 28.8 MCHC: 32.1 RDW: 13.8 PLATELET: 843 (L) Chloride: 112 (H)  
CO2: 19 (L) Anion gap: 10  
Glucose: 117 (H) BUN: 31 (H) Creatinine: 0.92  
BUN/Creatinine ratio: 34 (H) Calcium: 8.3 (L) MEDS Crestor 10mg po qhs  
Protonix 40mg iv q12 ATTENDING NOTE Patient seen and examined at bedside, feels well, looks pale, had bowel prep overnight. Ready for colonoscopy today. Will transfuse another unit of RBC today. And give some iron iv starting tomorrow  
   
Assessment & Plan:  
   
#. Dyspnea on exertion; acute anemia due to GI bleeding.  
- initial hemoglobin 4.2; s/p 2 unit of PRBC.  Monitor hemoglobin every 8 hours.    
- GI following, s/p EGD 3/16: no clear bleeding source. Plans for C-scope  
   
2. Atrial fibrillation; s/p ablation. Hold Xarelto continue amiodarone 3. Aortic stenosis; [facility] 3/9/2021. Plan for TAVR on 3/24.  Cardiology 4. History of 2 cm colonic lesion; GI evaluating. F/u op.  
   
Code status: Full DVT prophylaxis: SCDs Care Plan discussed with: Patient/Family and Nurse General:  Alert, oriented, No acute distress, pale. Pleasant WM Card:  S1, S2 without murmur, good peripheral perfusion Resp:  No accessory muscle use, no wheezes, no crepitations Abd:  Soft, non-tender, non-distended Extremities:  No cyanosis or clubbing, no significant edema Neuro:  Grossly normal, no focal neuro deficits, follows commands Psych:  Good insight, not agitated.  
   
  
CARDIOLOGY NOTE Subjective: No acute events overnight.  Hgb came up appropriately after PRBC but trended back down this morning to 7.0.  
   
Denies dyspnea, CP, LH.  Feels back to normal.  Anxious to go home.  
   
GI plans colonoscopy and possible pill endoscopy today. Physical Exam  
GEN: NAD, appears stated age HEENT: EOMI, MMM, OP clear NECK: Normal JVP  
CV: RRR, normal S1, soft S2, harsh III/VI late-peaking systolic murmur at LUSB, no rubs or gallops LUNGS: CTAB, no W/R/R  
ABD: NABS, soft, NT/ND  
EXT: No edema, 2+ distal pulses PSYCH: Mood and affect normal  
NEURO: AAO, MAEW, face symmetrical, speech intact Assessment: 1. 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 NAFLD; TAVR CT with findings suggestive of possible cirrhosis, however, RUQ U/S shows no evidence of cirrhosis 3. Symptomatic, severe AS 4. HTN  
5. AF/AFL s/p recent ablation by Dr. oDnna Mckinleyg: - EGD 3/16/21 unremarkable, GI plans colonoscopy and possible capsule endoscopy today - Agree with transfusion; goal Hgb > 8 given symptomatic severe aortic stenosis - Continue amiodarone - Agree with PPI; will defer potential need for oral PPI to GI  
- Continue rosuvastatin - If becomes hypertensive, can restart outpatient lisinopril at lower dose; hold for now given normal / at times low BP  
- Hold aspirin and rivaroxaban; discussed with Dr. Ori Flores and will put on ONLY apixaban 2.5 mg q12h once anemia is stable - Continue plans for possible TAVR next Wednesday, 3/24/21  
- Discussed with Dr. Jose Roberto Gordon (GI) COLONOSCOPY PROCEDURE NOTE  
Pre-procedure Diagnoses Iron deficiency anemia, unspecified iron deficiency anemia type [D50.9] Melena [K92.1] Post-procedure Diagnoses Polyp of colon, unspecified part of colon, unspecified type [K63.5] Diverticulosis large intestine w/o perforation or abscess w/o bleeding [K57.30] Internal hemorrhoids [K64.8] Procedures Yuliet Damon [[id number]] Montserrat Shows [[id number]] COLONOSCPY,FLEX,W/ [address] INJECT [[id number]]  
  
  Findings:   
Rectum: in the proximal rectum/rectosigmoid at 19 cm, a non-obstructing 4-5 cm polypoid mass was seen. This was cold biopsied. [place] ink tattoo was placed distally. Sigmoid: a 2.5 cm pedunculated polyp was removed with hot snare. Single Resolution 360 clip was placed to close polypectomy site. Moderate sigmoid diverticulosis Descending Colon: there were two semi-sessile polyps 8-9 mm each, both removed with hot snare and single Resolution 360 clip placed at each polypectomy site Transverse Colon:  there were two semi-sessile polyps 10-11 mm each, both removed with hot snare and single Resolution 360 clip placed at each polypectomy site Ascending Colon: single 6-7 mm semi-sessile polyp, removed with hot snare Cecum: two 2-3 mm sessile polyps - removed with cold biopsy forceps Terminal Ileum: normal  
Impression:    
1. Rectal polypoid mass - biopsied and tattooed 2. Multiple colon polyps - removed as above  
   
Recommendations:  
1. Cardiac regular diet 2. Would continue to hold anticoagulation as able 3. Follow up surgical pathology 4. Surgical consultation for rectal polypoid mass 5. Will follow

## 2021-03-19 NOTE — TELEPHONE ENCOUNTER
I spoke with the patient's wife and let her know that the only medication that we would stop would be his Metformin and we will stop that 24 hours prior to his procedure. He will not take any medications the morning of his surgery.

## 2021-03-21 LAB
LEFT CCA DIST DIAS: 15 CM/S
LEFT CCA DIST SYS: 83.7 CM/S
LEFT CCA PROX DIAS: 18.9 CM/S
LEFT CCA PROX SYS: 120.8 CM/S
LEFT ECA DIAS: 0 CM/S
LEFT ECA SYS: 100.9 CM/S
LEFT ICA DIST DIAS: 25.5 CM/S
LEFT ICA DIST SYS: 81.1 CM/S
LEFT ICA MID DIAS: 25.5 CM/S
LEFT ICA MID SYS: 93 CM/S
LEFT ICA PROX DIAS: 22.9 CM/S
LEFT ICA PROX SYS: 70.5 CM/S
LEFT ICA/CCA SYS: 1.11
LEFT VERTEBRAL DIAS: 24.21 CM/S
LEFT VERTEBRAL SYS: 86.4 CM/S
RIGHT CCA DIST DIAS: 13.6 CM/S
RIGHT CCA DIST SYS: 74.5 CM/S
RIGHT CCA PROX DIAS: 11.3 CM/S
RIGHT CCA PROX SYS: 71.8 CM/S
RIGHT ECA DIAS: 10.98 CM/S
RIGHT ECA SYS: 128.7 CM/S
RIGHT ICA DIST DIAS: 24.2 CM/S
RIGHT ICA DIST SYS: 94.3 CM/S
RIGHT ICA MID DIAS: 21.6 CM/S
RIGHT ICA MID SYS: 106.2 CM/S
RIGHT ICA PROX DIAS: 15 CM/S
RIGHT ICA PROX SYS: 108.9 CM/S
RIGHT ICA/CCA SYS: 1.5
RIGHT VERTEBRAL DIAS: 5.69 CM/S
RIGHT VERTEBRAL SYS: 66.5 CM/S

## 2021-03-22 ENCOUNTER — TELEPHONE (OUTPATIENT)
Dept: CARDIOLOGY CLINIC | Age: 76
End: 2021-03-22

## 2021-03-22 ENCOUNTER — HOSPITAL ENCOUNTER (OUTPATIENT)
Dept: PREADMISSION TESTING | Age: 76
Discharge: HOME | End: 2021-03-22
Payer: COMMERCIAL

## 2021-03-22 VITALS
OXYGEN SATURATION: 100 % | BODY MASS INDEX: 24.66 KG/M2 | HEART RATE: 71 BPM | TEMPERATURE: 97.7 F | SYSTOLIC BLOOD PRESSURE: 148 MMHG | DIASTOLIC BLOOD PRESSURE: 65 MMHG | HEIGHT: 72 IN | RESPIRATION RATE: 18 BRPM | WEIGHT: 182.1 LBS

## 2021-03-22 PROBLEM — I35.0 SEVERE AORTIC STENOSIS: Status: ACTIVE | Noted: 2021-03-22

## 2021-03-22 LAB
ALBUMIN SERPL-MCNC: 3.2 G/DL (ref 3.5–5)
ALBUMIN/GLOB SERPL: 0.8 {RATIO} (ref 1.1–2.2)
ALP SERPL-CCNC: 110 U/L (ref 45–117)
ALT SERPL-CCNC: 31 U/L (ref 12–78)
ANION GAP SERPL CALC-SCNC: 4 MMOL/L (ref 5–15)
APPEARANCE UR: CLEAR
APTT PPP: 26.3 SEC (ref 22.1–31)
ARTERIAL PATENCY WRIST A: YES
AST SERPL-CCNC: 29 U/L (ref 15–37)
BACTERIA URNS QL MICRO: NEGATIVE /HPF
BASE EXCESS BLD CALC-SCNC: 0 MMOL/L
BASOPHILS # BLD: 0 K/UL (ref 0–0.1)
BASOPHILS NFR BLD: 0 % (ref 0–1)
BDY SITE: ABNORMAL
BILIRUB SERPL-MCNC: 0.8 MG/DL (ref 0.2–1)
BILIRUB UR QL: NEGATIVE
BNP SERPL-MCNC: 1087 PG/ML
BUN SERPL-MCNC: 19 MG/DL (ref 6–20)
BUN/CREAT SERPL: 19 (ref 12–20)
CA-I BLD-SCNC: 1.25 MMOL/L (ref 1.12–1.32)
CALCIUM SERPL-MCNC: 9 MG/DL (ref 8.5–10.1)
CHLORIDE SERPL-SCNC: 107 MMOL/L (ref 97–108)
CO2 SERPL-SCNC: 25 MMOL/L (ref 21–32)
COLOR UR: NORMAL
CREAT SERPL-MCNC: 0.99 MG/DL (ref 0.7–1.3)
DIFFERENTIAL METHOD BLD: ABNORMAL
EOSINOPHIL # BLD: 0.1 K/UL (ref 0–0.4)
EOSINOPHIL NFR BLD: 1 % (ref 0–7)
EPITH CASTS URNS QL MICRO: NORMAL /LPF
ERYTHROCYTE [DISTWIDTH] IN BLOOD BY AUTOMATED COUNT: 15.7 % (ref 11.5–14.5)
EST. AVERAGE GLUCOSE BLD GHB EST-MCNC: 108 MG/DL
GAS FLOW.O2 O2 DELIVERY SYS: ABNORMAL L/MIN
GLOBULIN SER CALC-MCNC: 3.9 G/DL (ref 2–4)
GLUCOSE SERPL-MCNC: 124 MG/DL (ref 65–100)
GLUCOSE UR STRIP.AUTO-MCNC: NEGATIVE MG/DL
HBA1C MFR BLD: 5.4 % (ref 4–5.6)
HCO3 BLD-SCNC: 23.4 MMOL/L (ref 22–26)
HCT VFR BLD AUTO: 29.6 % (ref 36.6–50.3)
HGB BLD-MCNC: 9.2 G/DL (ref 12.1–17)
HGB UR QL STRIP: NEGATIVE
HISTORY CHECKED?,CKHIST: NORMAL
HYALINE CASTS URNS QL MICRO: NORMAL /LPF (ref 0–5)
IMM GRANULOCYTES # BLD AUTO: 0.1 K/UL (ref 0–0.04)
IMM GRANULOCYTES NFR BLD AUTO: 1 % (ref 0–0.5)
INR PPP: 1.1 (ref 0.9–1.1)
KETONES UR QL STRIP.AUTO: NEGATIVE MG/DL
LEUKOCYTE ESTERASE UR QL STRIP.AUTO: NEGATIVE
LYMPHOCYTES # BLD: 0.6 K/UL (ref 0.8–3.5)
LYMPHOCYTES NFR BLD: 11 % (ref 12–49)
MAGNESIUM SERPL-MCNC: 2.3 MG/DL (ref 1.6–2.4)
MCH RBC QN AUTO: 27.8 PG (ref 26–34)
MCHC RBC AUTO-ENTMCNC: 31.1 G/DL (ref 30–36.5)
MCV RBC AUTO: 89.4 FL (ref 80–99)
MONOCYTES # BLD: 0.5 K/UL (ref 0–1)
MONOCYTES NFR BLD: 8 % (ref 5–13)
NEUTS SEG # BLD: 4.5 K/UL (ref 1.8–8)
NEUTS SEG NFR BLD: 79 % (ref 32–75)
NITRITE UR QL STRIP.AUTO: NEGATIVE
NRBC # BLD: 0 K/UL (ref 0–0.01)
NRBC BLD-RTO: 0 PER 100 WBC
O2/TOTAL GAS SETTING VFR VENT: 21 %
PCO2 BLD: 31.2 MMHG (ref 35–45)
PH BLD: 7.48 [PH] (ref 7.35–7.45)
PH UR STRIP: 5.5 [PH] (ref 5–8)
PLATELET # BLD AUTO: 123 K/UL (ref 150–400)
PMV BLD AUTO: 9.2 FL (ref 8.9–12.9)
PO2 BLD: 113 MMHG (ref 80–100)
POTASSIUM SERPL-SCNC: 4 MMOL/L (ref 3.5–5.1)
PROT SERPL-MCNC: 7.1 G/DL (ref 6.4–8.2)
PROT UR STRIP-MCNC: NEGATIVE MG/DL
PROTHROMBIN TIME: 11.4 SEC (ref 9–11.1)
RBC # BLD AUTO: 3.31 M/UL (ref 4.1–5.7)
RBC #/AREA URNS HPF: NORMAL /HPF (ref 0–5)
RBC MORPH BLD: ABNORMAL
SAO2 % BLD: 99 % (ref 92–97)
SODIUM SERPL-SCNC: 136 MMOL/L (ref 136–145)
SP GR UR REFRACTOMETRY: 1.02 (ref 1–1.03)
SPECIMEN TYPE: ABNORMAL
THERAPEUTIC RANGE,PTTT: NORMAL SECS (ref 58–77)
TOTAL RESP. RATE, ITRR: 12
TSH SERPL DL<=0.05 MIU/L-ACNC: 4.33 UIU/ML (ref 0.36–3.74)
UA: UC IF INDICATED,UAUC: NORMAL
UR CULT HOLD, URHOLD: NORMAL
UROBILINOGEN UR QL STRIP.AUTO: 0.2 EU/DL (ref 0.2–1)
WBC # BLD AUTO: 5.8 K/UL (ref 4.1–11.1)
WBC URNS QL MICRO: NORMAL /HPF (ref 0–4)

## 2021-03-22 PROCEDURE — 36415 COLL VENOUS BLD VENIPUNCTURE: CPT

## 2021-03-22 PROCEDURE — 82803 BLOOD GASES ANY COMBINATION: CPT

## 2021-03-22 PROCEDURE — 36600 WITHDRAWAL OF ARTERIAL BLOOD: CPT

## 2021-03-22 PROCEDURE — 81001 URINALYSIS AUTO W/SCOPE: CPT

## 2021-03-22 PROCEDURE — 83735 ASSAY OF MAGNESIUM: CPT

## 2021-03-22 PROCEDURE — 80053 COMPREHEN METABOLIC PANEL: CPT

## 2021-03-22 PROCEDURE — 86923 COMPATIBILITY TEST ELECTRIC: CPT

## 2021-03-22 PROCEDURE — 83036 HEMOGLOBIN GLYCOSYLATED A1C: CPT

## 2021-03-22 PROCEDURE — 84443 ASSAY THYROID STIM HORMONE: CPT

## 2021-03-22 PROCEDURE — 86901 BLOOD TYPING SEROLOGIC RH(D): CPT

## 2021-03-22 PROCEDURE — 83880 ASSAY OF NATRIURETIC PEPTIDE: CPT

## 2021-03-22 PROCEDURE — 85730 THROMBOPLASTIN TIME PARTIAL: CPT

## 2021-03-22 PROCEDURE — 85025 COMPLETE CBC W/AUTO DIFF WBC: CPT

## 2021-03-22 PROCEDURE — 85610 PROTHROMBIN TIME: CPT

## 2021-03-22 NOTE — PERIOP NOTES
Patient preferred not to wait for Anesthesia. Plan for him to come by after ABGS if he would like, otherwise going to surgeon after.

## 2021-03-22 NOTE — H&P
CSS   History and Physical    Subjective:   ** The following info was taken from prior office consult note. Salina Bucio is a 70 y.o. male  with PMHx of Aortic Stenosis, Atrial Fibrillation with recent Ablation on Xarelto, GI Bleed, HTN, Colon polyps, DM II, that is referred to the [address] [place] [address] by Dr. Abran Lennox for interventional evaluation of  Aortic Stenosis.     The patient is currently admitted for treatment and work up of GI Bleed with Heme + stool and with Hgb of 4.2 on admit. He received 4 units of PRBCs on this admission. His Xarelto was placed on hold and has underwent an EGD and Colonoscopy. On Colonoscopy he was found to have a 4-5 cm mass which was biopsied-results pending and multiple colon polyps. General Surgery has consulted for rectal mass and awaiting surgical recommendations from Dr. Dmitriy Garibay.     Prior to this acute episode, the patient reports that he used to go to the [employer] routinely for exercise. Since COVID, however, he has remained at home and felt that his decreased activity was a product of his reduction in exercise. 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 He takes his dog for walks of about a mile and has found that he has gotten more tired with over the last several months. He denies worsening fatigue, palpitation, chest pain, syncope or falls, orthopnea, PND, LE edema. He did note some dizziness but only recently. He started Xarelto over the last month due to new diagnosis of atrial fibrillation and subsequent ablation. He said that he has never noticed blood in his stool.     Still working full time as a Banker. Lives with his wife in a house. His wife is able to assist following surgery.     NYHA Classification: IIB              Class II (Mild): Slight limitation of physical activity.  Comfortable at rest, but ordinary physical activity results in fatigue, palpitation, or dyspnea.        Angina Classification: 0              Class 0: No symptoms    Cardiac Testing  EKG: 3/14/21  3/15/2021  1:58 PM - Benigno, Card Result In     Component Value Ref Range & Units Status   Ventricular Rate 99  BPM Final   Atrial Rate 99  BPM Final   P-R Interval 176  ms Final   QRS Duration 88  ms Final   Q-T Interval 376  ms Final   QTC Calculation (Bezet) 482  ms Final   Calculated P Axis 61  degrees Final   Calculated R Axis 58  degrees Final   Calculated T Axis 73  degrees Final   Diagnosis     Final   Normal sinus rhythm   Left ventricular hypertrophy with repolarization abnormality   Prolonged QT             TTE:  1/12/21 Completed at [facility] and scanned into media  Summary  1. Hypertrophied left ventricle with normal left ventricular systolic function. Mild left atrial enlargement  2. Severe aortic stenosis and moderate aortic regurgitation  3. Milt-to-moderate mitral regurgitation     DE: 2/15/21  -------------------------------------------------------------------  Conclusions     1. Left ventricle: Wall thickness was increased in a pattern of     moderate LVH. Systolic function was normal. The estimated     ejection fraction was 60-65%. 2. Aortic valve: The aortic valve is severely calcified and does     not open. This is suggestive of severe aortic stenosis. Recommend transthoracic study to measure gradient. There is     limited excursion or the valve. 3. Aorta: Aortic root is mildly enlarged at 3.9 cm transverse     diameter. 4. Mitral valve: There was mild regurgitation. 5. Left atrium: The LA is enlarged. There is no thrombi in the     atrial appendage. Echogenic spontaneious contrast or  quot;smoke quot;     seen in left atrium. No evidence of thrombus in the atrial     cavity or appendage. 6. Right atrium: The atrium was mildly dilated.   7. Atrial septum: No atrial septal defect was identified by color     flow Doppler.        Cardiac catheterization: 3/9/21  Conclusion        · Catheters used: 5 Fr Mt, JL 3.5 (Clearance Dame would not engage the LM) diagnostic catheters  · Uncomplicated ultrasound-guided access of the RRA with successful hemostasis of the 5 Fr RRA access site using a TR band  · Heparin was used for procedural anticoagulation     1. Moderate 40-50% stenosis in the proximal/mid LAD just after the take-off of D1.     2. Minimal luminal irregularities otherwise in a right dominant coronary circulation with a small/moderate sized ramus intermedius artery.     3. Calcified aortic valve in keeping with known diagnosis of aortic stenosis.     4. Continue evaluation for TAVR.     5. Results discussed with the patient and the patient's wife.       Complications     Complications documented before study signed (3/9/2021  5:52 PM)      No complications were associated with this study. Documented by Bushra Marte MD - 3/9/2021  5:44 PM      Coronary Findings     Diagnostic  Dominance: Right  Left Main   The vessel was visualized by angiography. The vessel is angiographically normal.   Left Anterior Descending   Prox LAD to Mid LAD lesion, 45% stenosed. Ramus Intermedius   The vessel was visualized by angiography. The vessel is angiographically normal.   Left Circumflex   The vessel was visualized by angiography. The vessel exhibits minimal luminal irregularities. Right Coronary Artery   The vessel was visualized by angiography. The vessel exhibits minimal luminal irregularities. Intervention     No interventions have been documented.      Carotid Dopplers: Consistent with less than 50% stenosis of the right internal carotid and less than 50% stenosis (low end) of the left internal carotid.   Vertebrals are patent with antegrade flow.     PFTs: FEV1/Predicted:  N/a    Gated C/A/P CTA: Completed 3/11/21    Past Medical History:   Diagnosis Date    Aortic regurgitation     Aortic stenosis     Atrial fibrillation (HCC)     Colon polyps     Diabetes mellitus, type 2 (HCC)     GI bleed     HTN (hypertension)      Past Surgical History: Procedure Laterality Date    COLONOSCOPY N/A 3/16/2021    COLONOSCOPY performed by Martha Hall MD at [facility] END[facility]COPY    HX AFIB ABLATION  02/2021      Social History     Tobacco Use    Smoking status: Not on file   Substance Use Topics    Alcohol use: Not on file      No family history on file. Prior to Admission medications    Medication Sig Start Date End Date Taking? Authorizing Provider   Ferrous Fumarate 325 mg (106 mg iron) tab Take 1 Tab by mouth two (2) times a day for 30 days. 3/18/21 4/17/21  Adan Jackson MD   traMADoL (ULTRAM) 50 mg tablet Take 1 Tab by mouth every six (6) hours as needed for Pain for up to 3 days. Max Daily Amount: 200 mg. 3/18/21 3/21/21  Phyllis Vasquez MD   amiodarone (CORDARONE) 200 mg tablet Take 200 mg by mouth. Provider, Historical   metFORMIN (GLUCOPHAGE) 500 mg tablet Take 500 mg by mouth daily (with breakfast). Provider, Historical   multivitamin (ONE A DAY) tablet Take 1 Tab by mouth daily. Provider, Historical   pantoprazole (PROTONIX) 40 mg tablet Take 40 mg by mouth daily. Provider, Historical   rosuvastatin (CRESTOR) 10 mg tablet Take 1 Tab by mouth nightly. Indications: hardening of the arteries due to plaque buildup 3/9/21   Janelle Gomez MD       No Known Allergies      Review of Systems:   Consititutional: Denies fever or chills. Eyes:  Denies use of glasses or vision problems(cataracts). ENT:  Denies hearing or swallowing difficulty. CV: Denies CP, claudication, HTN. Resp: Denies dyspnea, productive cough. : Denies dialysis or kidney problems. GI: Denies ulcers, esophageal strictures, liver problems. M/S: Denies joint or bone problems, or implanted artificial hardware. Skin: Denies varicose veins, edema. Neuro: Denies strokes, or TIAs. Psych: Denies anxiety or depression. Endocrine: Denies thyroid problems or diabetes. Heme/Lymphatic: Denies easy bruising or lymphedema.      Objective:     VS: There were no vitals taken for this visit. Physical Exam:    General: Well nourished well groomed Male appearing stated age  Neuro: A&OX3. ACEVEDO. PERRL. Steady unassisted gait  Head:Normocephalic. Atraumatic. Symmetrical  Neck: Trachea Midline  Resp: CTA B. No Adv BS/cough/sputum/tachypnea with seated conversation  CV: S1S2 RRR. LESA III/VI. No JVD/carotid bruits. Pink/warm/dry extremities. No LE peripheral edema  GI:Benign ab. Soft. NT/ND. Active BS  : Voids  Integ: No obvious s/s of infection or breakdown  Musculo/Skeletal: FROM in all major joints. Good muscle tone       Labs:   Recent Labs     03/22/21  1033   WBC 5.8   HGB 9.2*   HCT 29.6*   *      K 4.0   BUN 19   CREA 0.99   *   INR 1.1       Diagnostics:   PA and lateral:   CXR Results  (Last 48 hours)    None        Clinic Evaluation:   KCCQ-12: scanned into EMR     5 meter gait: 6.2 seconds     Frality Survey:  Liao Index ADL - 6/6  scanned into EMR      STS 4.2 Risk Score / Predicted 30 day mortality: - calculations scanned into EMR  AVR  Mortality: 1.977%  Morbidity or Mortality: 14.184%         Assessment:     Active Problems:    Severe aortic stenosis (3/22/2021)        Plan:   The risk and benefit of surgery were reviewed with patient and family and all questions answered and the patient wishes to proceed. Risk include infection, bleeding, stroke, heart attack, irregular heart rhythm, kidney failure and death. The patient was given instructions. The patient was instructed to stop metformin 24 hrs prior to surgery. Surgery is scheduled for 3/24/21    Treatment Plan:    1. Aortic Stenosis severe and symptomatic with moderate AI: Low surgical risk. Plan for TAVR 3/24/21 w/ Dr. Mary Ann Chavez and Dr. Mannie Rico      2. GI Bleed/Anemia: Underwent EGD-no acute bleeding noted, and Colonoscopy +  Rectal mass. On PPI, oral iron. Received 4 units PRBCs while inpt.      3. Rectal mass found on Colonoscopy: Surgery consulted -- paths all report tubular adenoma.   Needs outpt surgical FU for resection plans.      4. Atrial fibrillation s/p Ablation in Feb 2021: Had been on Xarelto but now on hold due to GIB. On Amiodarone.     5. HTN:  Not currently on antihypertensives.     6. HLD: On Statin     7. DM II: ON Metformin -hold 24 hrs prior to surgery. Pt's wife reports a healing diabetic ulcer on R great toe. 8. Anesthesia history: Pt's wife reports during recent colonscopy w/ sedation, there was concern for ISABEL.   She asked this please be shared with anesthesia staff.          Signed By: Shasha Agosto NP     March 22, 2021

## 2021-03-23 RX ORDER — SODIUM CHLORIDE 9 MG/ML
1.5-3 INJECTION, SOLUTION INTRAVENOUS
Status: DISCONTINUED | OUTPATIENT
Start: 2021-03-24 | End: 2021-03-24

## 2021-03-23 RX ORDER — DEXMEDETOMIDINE HYDROCHLORIDE 4 UG/ML
.1-1.5 INJECTION, SOLUTION INTRAVENOUS
Status: DISCONTINUED | OUTPATIENT
Start: 2021-03-24 | End: 2021-03-24

## 2021-03-24 ENCOUNTER — APPOINTMENT (OUTPATIENT)
Dept: GENERAL RADIOLOGY | Age: 76
DRG: 267 | End: 2021-03-24
Attending: NURSE PRACTITIONER
Payer: COMMERCIAL

## 2021-03-24 ENCOUNTER — ANESTHESIA (OUTPATIENT)
Dept: CARDIOTHORACIC SURGERY | Age: 76
DRG: 267 | End: 2021-03-24
Payer: COMMERCIAL

## 2021-03-24 ENCOUNTER — APPOINTMENT (OUTPATIENT)
Dept: GENERAL RADIOLOGY | Age: 76
DRG: 267 | End: 2021-03-24
Attending: THORACIC SURGERY (CARDIOTHORACIC VASCULAR SURGERY)
Payer: COMMERCIAL

## 2021-03-24 ENCOUNTER — ANESTHESIA EVENT (OUTPATIENT)
Dept: CARDIOTHORACIC SURGERY | Age: 76
DRG: 267 | End: 2021-03-24
Payer: COMMERCIAL

## 2021-03-24 ENCOUNTER — HOSPITAL ENCOUNTER (INPATIENT)
Age: 76
LOS: 1 days | Discharge: HOME | DRG: 267 | End: 2021-03-25
Attending: THORACIC SURGERY (CARDIOTHORACIC VASCULAR SURGERY) | Admitting: THORACIC SURGERY (CARDIOTHORACIC VASCULAR SURGERY)
Payer: COMMERCIAL

## 2021-03-24 ENCOUNTER — APPOINTMENT (OUTPATIENT)
Dept: NON INVASIVE DIAGNOSTICS | Age: 76
DRG: 267 | End: 2021-03-24
Attending: THORACIC SURGERY (CARDIOTHORACIC VASCULAR SURGERY)
Payer: COMMERCIAL

## 2021-03-24 PROBLEM — I35.0 AORTIC STENOSIS: Status: ACTIVE | Noted: 2021-03-24

## 2021-03-24 LAB
ABO + RH BLD: NORMAL
ALBUMIN SERPL-MCNC: 2.8 G/DL (ref 3.5–5)
ALBUMIN/GLOB SERPL: 0.9 {RATIO} (ref 1.1–2.2)
ALP SERPL-CCNC: 82 U/L (ref 45–117)
ALT SERPL-CCNC: 27 U/L (ref 12–78)
ANION GAP SERPL CALC-SCNC: 3 MMOL/L (ref 5–15)
APTT PPP: 26.2 SEC (ref 22.1–31)
AST SERPL-CCNC: 29 U/L (ref 15–37)
BASOPHILS # BLD: 0 K/UL (ref 0–0.1)
BASOPHILS NFR BLD: 0 % (ref 0–1)
BILIRUB SERPL-MCNC: 0.5 MG/DL (ref 0.2–1)
BLD PROD TYP BPU: NORMAL
BLD PROD TYP BPU: NORMAL
BLOOD GROUP ANTIBODIES SERPL: NORMAL
BPU ID: NORMAL
BPU ID: NORMAL
BUN SERPL-MCNC: 16 MG/DL (ref 6–20)
BUN/CREAT SERPL: 20 (ref 12–20)
CALCIUM SERPL-MCNC: 8.4 MG/DL (ref 8.5–10.1)
CHLORIDE SERPL-SCNC: 110 MMOL/L (ref 97–108)
CO2 SERPL-SCNC: 25 MMOL/L (ref 21–32)
CREAT SERPL-MCNC: 0.81 MG/DL (ref 0.7–1.3)
CROSSMATCH RESULT,%XM: NORMAL
CROSSMATCH RESULT,%XM: NORMAL
DIFFERENTIAL METHOD BLD: ABNORMAL
EOSINOPHIL # BLD: 0.1 K/UL (ref 0–0.4)
EOSINOPHIL NFR BLD: 1 % (ref 0–7)
ERYTHROCYTE [DISTWIDTH] IN BLOOD BY AUTOMATED COUNT: 15.9 % (ref 11.5–14.5)
GLOBULIN SER CALC-MCNC: 3.1 G/DL (ref 2–4)
GLUCOSE BLD STRIP.AUTO-MCNC: 105 MG/DL (ref 65–100)
GLUCOSE SERPL-MCNC: 77 MG/DL (ref 65–100)
HCT VFR BLD AUTO: 26.5 % (ref 36.6–50.3)
HGB BLD-MCNC: 8.3 G/DL (ref 12.1–17)
IMM GRANULOCYTES # BLD AUTO: 0.1 K/UL (ref 0–0.04)
IMM GRANULOCYTES NFR BLD AUTO: 1 % (ref 0–0.5)
INR PPP: 1.1 (ref 0.9–1.1)
LYMPHOCYTES # BLD: 0.7 K/UL (ref 0.8–3.5)
LYMPHOCYTES NFR BLD: 12 % (ref 12–49)
MAGNESIUM SERPL-MCNC: 2.2 MG/DL (ref 1.6–2.4)
MCH RBC QN AUTO: 28 PG (ref 26–34)
MCHC RBC AUTO-ENTMCNC: 31.3 G/DL (ref 30–36.5)
MCV RBC AUTO: 89.5 FL (ref 80–99)
MONOCYTES # BLD: 0.5 K/UL (ref 0–1)
MONOCYTES NFR BLD: 9 % (ref 5–13)
NEUTS SEG # BLD: 4.5 K/UL (ref 1.8–8)
NEUTS SEG NFR BLD: 77 % (ref 32–75)
NRBC # BLD: 0 K/UL (ref 0–0.01)
NRBC BLD-RTO: 0 PER 100 WBC
PLATELET # BLD AUTO: 100 K/UL (ref 150–400)
PMV BLD AUTO: 9.1 FL (ref 8.9–12.9)
POTASSIUM SERPL-SCNC: 4.4 MMOL/L (ref 3.5–5.1)
PROT SERPL-MCNC: 5.9 G/DL (ref 6.4–8.2)
PROTHROMBIN TIME: 11.9 SEC (ref 9–11.1)
RBC # BLD AUTO: 2.96 M/UL (ref 4.1–5.7)
RBC MORPH BLD: ABNORMAL
SERVICE CMNT-IMP: ABNORMAL
SODIUM SERPL-SCNC: 138 MMOL/L (ref 136–145)
SPECIMEN EXP DATE BLD: NORMAL
STATUS OF UNIT,%ST: NORMAL
STATUS OF UNIT,%ST: NORMAL
THERAPEUTIC RANGE,PTTT: NORMAL SECS (ref 58–77)
UNIT DIVISION, %UDIV: 0
UNIT DIVISION, %UDIV: 0
WBC # BLD AUTO: 5.9 K/UL (ref 4.1–11.1)

## 2021-03-24 PROCEDURE — 74011250636 HC RX REV CODE- 250/636: Performed by: ANESTHESIOLOGY

## 2021-03-24 PROCEDURE — 85610 PROTHROMBIN TIME: CPT

## 2021-03-24 PROCEDURE — 74011250636 HC RX REV CODE- 250/636: Performed by: NURSE PRACTITIONER

## 2021-03-24 PROCEDURE — C1760 CLOSURE DEV, VASC: HCPCS | Performed by: INTERNAL MEDICINE

## 2021-03-24 PROCEDURE — 65610000003 HC RM ICU SURGICAL

## 2021-03-24 PROCEDURE — 77030019702 HC WRP THER MENM -C: Performed by: INTERNAL MEDICINE

## 2021-03-24 PROCEDURE — 85025 COMPLETE CBC W/AUTO DIFF WBC: CPT

## 2021-03-24 PROCEDURE — 74011250637 HC RX REV CODE- 250/637: Performed by: NURSE PRACTITIONER

## 2021-03-24 PROCEDURE — 76060000038 HC ANESTHESIA 3.5 TO 4 HR: Performed by: INTERNAL MEDICINE

## 2021-03-24 PROCEDURE — 93308 TTE F-UP OR LMTD: CPT

## 2021-03-24 PROCEDURE — 74011000258 HC RX REV CODE- 258: Performed by: NURSE ANESTHETIST, CERTIFIED REGISTERED

## 2021-03-24 PROCEDURE — 74011000250 HC RX REV CODE- 250: Performed by: NURSE ANESTHETIST, CERTIFIED REGISTERED

## 2021-03-24 PROCEDURE — 2709999900 HC NON-CHARGEABLE SUPPLY: Performed by: INTERNAL MEDICINE

## 2021-03-24 PROCEDURE — B246YZZ ULTRASONOGRAPHY OF RIGHT AND LEFT HEART USING OTHER CONTRAST: ICD-10-PCS | Performed by: INTERNAL MEDICINE

## 2021-03-24 PROCEDURE — 5A1223Z PERFORMANCE OF CARDIAC PACING, CONTINUOUS: ICD-10-PCS | Performed by: INTERNAL MEDICINE

## 2021-03-24 PROCEDURE — C1894 INTRO/SHEATH, NON-LASER: HCPCS | Performed by: INTERNAL MEDICINE

## 2021-03-24 PROCEDURE — 02RF38Z REPLACEMENT OF AORTIC VALVE WITH ZOOPLASTIC TISSUE, PERCUTANEOUS APPROACH: ICD-10-PCS | Performed by: INTERNAL MEDICINE

## 2021-03-24 PROCEDURE — 80053 COMPREHEN METABOLIC PANEL: CPT

## 2021-03-24 PROCEDURE — 74011000250 HC RX REV CODE- 250: Performed by: NURSE PRACTITIONER

## 2021-03-24 PROCEDURE — 93306 TTE W/DOPPLER COMPLETE: CPT

## 2021-03-24 PROCEDURE — 77030040922 HC BLNKT HYPOTHRM STRY -A

## 2021-03-24 PROCEDURE — C1769 GUIDE WIRE: HCPCS | Performed by: INTERNAL MEDICINE

## 2021-03-24 PROCEDURE — 93355 ECHO TRANSESOPHAGEAL (TEE): CPT | Performed by: INTERNAL MEDICINE

## 2021-03-24 PROCEDURE — 74011250636 HC RX REV CODE- 250/636: Performed by: NURSE ANESTHETIST, CERTIFIED REGISTERED

## 2021-03-24 PROCEDURE — 77030018729 HC ELECTRD DEFIB PAD CARD -B

## 2021-03-24 PROCEDURE — 77030041244 HC CBL PACE EXT TEMP REMG -B: Performed by: INTERNAL MEDICINE

## 2021-03-24 PROCEDURE — 74011250637 HC RX REV CODE- 250/637: Performed by: INTERNAL MEDICINE

## 2021-03-24 PROCEDURE — 76010000110 HC CV SURG 3 TO 3.5 HR: Performed by: INTERNAL MEDICINE

## 2021-03-24 PROCEDURE — 74011000250 HC RX REV CODE- 250: Performed by: ANESTHESIOLOGY

## 2021-03-24 PROCEDURE — 85730 THROMBOPLASTIN TIME PARTIAL: CPT

## 2021-03-24 PROCEDURE — 77030005402 HC CATH RAD ART LN KT TELE -B

## 2021-03-24 PROCEDURE — 36415 COLL VENOUS BLD VENIPUNCTURE: CPT

## 2021-03-24 PROCEDURE — 93005 ELECTROCARDIOGRAM TRACING: CPT

## 2021-03-24 PROCEDURE — 77030037468 HC VLV AORT EDWRD -L: Performed by: INTERNAL MEDICINE

## 2021-03-24 PROCEDURE — 77030004532 HC CATH ANGI DX IMP BSC -A: Performed by: INTERNAL MEDICINE

## 2021-03-24 PROCEDURE — B4101ZZ FLUOROSCOPY OF ABDOMINAL AORTA USING LOW OSMOLAR CONTRAST: ICD-10-PCS | Performed by: INTERNAL MEDICINE

## 2021-03-24 PROCEDURE — B41C1ZZ FLUOROSCOPY OF PELVIC ARTERIES USING LOW OSMOLAR CONTRAST: ICD-10-PCS | Performed by: INTERNAL MEDICINE

## 2021-03-24 PROCEDURE — 74011250636 HC RX REV CODE- 250/636: Performed by: THORACIC SURGERY (CARDIOTHORACIC VASCULAR SURGERY)

## 2021-03-24 PROCEDURE — 77030037877 HC DRSG MEPILEX >48IN BORD MOLN -A

## 2021-03-24 PROCEDURE — C1892 INTRO/SHEATH,FIXED,PEEL-AWAY: HCPCS | Performed by: INTERNAL MEDICINE

## 2021-03-24 PROCEDURE — 82962 GLUCOSE BLOOD TEST: CPT

## 2021-03-24 PROCEDURE — 33361 REPLACE AORTIC VALVE PERQ: CPT | Performed by: THORACIC SURGERY (CARDIOTHORACIC VASCULAR SURGERY)

## 2021-03-24 PROCEDURE — 71045 X-RAY EXAM CHEST 1 VIEW: CPT

## 2021-03-24 PROCEDURE — 83735 ASSAY OF MAGNESIUM: CPT

## 2021-03-24 DEVICE — SYSTEM CARD 29MM BOV WITH COMMAND DEL SYTEM ESHEATH INTRO SET: Type: IMPLANTABLE DEVICE | Site: AORTIC VALVE | Status: FUNCTIONAL

## 2021-03-24 RX ORDER — SODIUM CHLORIDE 0.9 % (FLUSH) 0.9 %
5-40 SYRINGE (ML) INJECTION EVERY 8 HOURS
Status: DISCONTINUED | OUTPATIENT
Start: 2021-03-24 | End: 2021-03-24 | Stop reason: HOSPADM

## 2021-03-24 RX ORDER — LOSARTAN POTASSIUM 25 MG/1
25 TABLET ORAL DAILY
Status: DISCONTINUED | OUTPATIENT
Start: 2021-03-24 | End: 2021-03-25 | Stop reason: HOSPADM

## 2021-03-24 RX ORDER — SODIUM CHLORIDE, SODIUM LACTATE, POTASSIUM CHLORIDE, CALCIUM CHLORIDE 600; 310; 30; 20 MG/100ML; MG/100ML; MG/100ML; MG/100ML
25 INJECTION, SOLUTION INTRAVENOUS CONTINUOUS
Status: DISCONTINUED | OUTPATIENT
Start: 2021-03-24 | End: 2021-03-24 | Stop reason: HOSPADM

## 2021-03-24 RX ORDER — PROPOFOL 10 MG/ML
INJECTION, EMULSION INTRAVENOUS AS NEEDED
Status: DISCONTINUED | OUTPATIENT
Start: 2021-03-24 | End: 2021-03-24 | Stop reason: HOSPADM

## 2021-03-24 RX ORDER — PROTAMINE SULFATE 10 MG/ML
INJECTION, SOLUTION INTRAVENOUS AS NEEDED
Status: DISCONTINUED | OUTPATIENT
Start: 2021-03-24 | End: 2021-03-24 | Stop reason: HOSPADM

## 2021-03-24 RX ORDER — ACETAMINOPHEN 325 MG/1
650 TABLET ORAL ONCE
Status: DISCONTINUED | OUTPATIENT
Start: 2021-03-24 | End: 2021-03-24 | Stop reason: HOSPADM

## 2021-03-24 RX ORDER — SODIUM CHLORIDE, SODIUM LACTATE, POTASSIUM CHLORIDE, CALCIUM CHLORIDE 600; 310; 30; 20 MG/100ML; MG/100ML; MG/100ML; MG/100ML
INJECTION, SOLUTION INTRAVENOUS
Status: DISCONTINUED | OUTPATIENT
Start: 2021-03-24 | End: 2021-03-24 | Stop reason: HOSPADM

## 2021-03-24 RX ORDER — FACIAL-BODY WIPES
10 EACH TOPICAL DAILY PRN
Status: DISCONTINUED | OUTPATIENT
Start: 2021-03-24 | End: 2021-03-25 | Stop reason: HOSPADM

## 2021-03-24 RX ORDER — LANOLIN ALCOHOL/MO/W.PET/CERES
400 CREAM (GRAM) TOPICAL 2 TIMES DAILY
Status: DISCONTINUED | OUTPATIENT
Start: 2021-03-25 | End: 2021-03-25 | Stop reason: HOSPADM

## 2021-03-24 RX ORDER — OXYCODONE AND ACETAMINOPHEN 5; 325 MG/1; MG/1
1-2 TABLET ORAL
Status: DISCONTINUED | OUTPATIENT
Start: 2021-03-24 | End: 2021-03-25 | Stop reason: HOSPADM

## 2021-03-24 RX ORDER — SODIUM CHLORIDE 0.9 % (FLUSH) 0.9 %
5-40 SYRINGE (ML) INJECTION AS NEEDED
Status: DISCONTINUED | OUTPATIENT
Start: 2021-03-24 | End: 2021-03-24 | Stop reason: HOSPADM

## 2021-03-24 RX ORDER — FENTANYL CITRATE 50 UG/ML
25 INJECTION, SOLUTION INTRAMUSCULAR; INTRAVENOUS
Status: DISCONTINUED | OUTPATIENT
Start: 2021-03-24 | End: 2021-03-24 | Stop reason: HOSPADM

## 2021-03-24 RX ORDER — SODIUM CHLORIDE 0.9 % (FLUSH) 0.9 %
5-40 SYRINGE (ML) INJECTION EVERY 8 HOURS
Status: DISCONTINUED | OUTPATIENT
Start: 2021-03-24 | End: 2021-03-25 | Stop reason: HOSPADM

## 2021-03-24 RX ORDER — ALBUTEROL SULFATE 0.83 MG/ML
2.5 SOLUTION RESPIRATORY (INHALATION)
Status: DISCONTINUED | OUTPATIENT
Start: 2021-03-24 | End: 2021-03-25 | Stop reason: HOSPADM

## 2021-03-24 RX ORDER — FENTANYL CITRATE 50 UG/ML
INJECTION, SOLUTION INTRAMUSCULAR; INTRAVENOUS AS NEEDED
Status: DISCONTINUED | OUTPATIENT
Start: 2021-03-24 | End: 2021-03-24 | Stop reason: HOSPADM

## 2021-03-24 RX ORDER — PANTOPRAZOLE SODIUM 40 MG/1
40 TABLET, DELAYED RELEASE ORAL DAILY
Status: DISCONTINUED | OUTPATIENT
Start: 2021-03-25 | End: 2021-03-25 | Stop reason: HOSPADM

## 2021-03-24 RX ORDER — PROPOFOL 10 MG/ML
INJECTION, EMULSION INTRAVENOUS
Status: DISCONTINUED | OUTPATIENT
Start: 2021-03-24 | End: 2021-03-24 | Stop reason: HOSPADM

## 2021-03-24 RX ORDER — HYDROMORPHONE HYDROCHLORIDE 1 MG/ML
0.2 INJECTION, SOLUTION INTRAMUSCULAR; INTRAVENOUS; SUBCUTANEOUS
Status: DISCONTINUED | OUTPATIENT
Start: 2021-03-24 | End: 2021-03-24 | Stop reason: HOSPADM

## 2021-03-24 RX ORDER — SODIUM CHLORIDE 9 MG/ML
INJECTION, SOLUTION INTRAVENOUS
Status: DISCONTINUED | OUTPATIENT
Start: 2021-03-24 | End: 2021-03-24 | Stop reason: HOSPADM

## 2021-03-24 RX ORDER — ROSUVASTATIN CALCIUM 10 MG/1
10 TABLET, COATED ORAL
Status: DISCONTINUED | OUTPATIENT
Start: 2021-03-24 | End: 2021-03-25 | Stop reason: HOSPADM

## 2021-03-24 RX ORDER — AMIODARONE HYDROCHLORIDE 200 MG/1
200 TABLET ORAL DAILY
Status: DISCONTINUED | OUTPATIENT
Start: 2021-03-25 | End: 2021-03-25 | Stop reason: HOSPADM

## 2021-03-24 RX ORDER — SODIUM CHLORIDE, SODIUM LACTATE, POTASSIUM CHLORIDE, CALCIUM CHLORIDE 600; 310; 30; 20 MG/100ML; MG/100ML; MG/100ML; MG/100ML
125 INJECTION, SOLUTION INTRAVENOUS CONTINUOUS
Status: DISCONTINUED | OUTPATIENT
Start: 2021-03-24 | End: 2021-03-24 | Stop reason: HOSPADM

## 2021-03-24 RX ORDER — TRAMADOL HYDROCHLORIDE 50 MG/1
50-100 TABLET ORAL
Status: DISCONTINUED | OUTPATIENT
Start: 2021-03-24 | End: 2021-03-25 | Stop reason: HOSPADM

## 2021-03-24 RX ORDER — SODIUM CHLORIDE 450 MG/100ML
10 INJECTION, SOLUTION INTRAVENOUS CONTINUOUS
Status: DISCONTINUED | OUTPATIENT
Start: 2021-03-24 | End: 2021-03-25 | Stop reason: HOSPADM

## 2021-03-24 RX ORDER — MIDAZOLAM HYDROCHLORIDE 1 MG/ML
INJECTION, SOLUTION INTRAMUSCULAR; INTRAVENOUS AS NEEDED
Status: DISCONTINUED | OUTPATIENT
Start: 2021-03-24 | End: 2021-03-24 | Stop reason: HOSPADM

## 2021-03-24 RX ORDER — HEPARIN SODIUM 1000 [USP'U]/ML
INJECTION, SOLUTION INTRAVENOUS; SUBCUTANEOUS AS NEEDED
Status: DISCONTINUED | OUTPATIENT
Start: 2021-03-24 | End: 2021-03-24 | Stop reason: HOSPADM

## 2021-03-24 RX ORDER — DIPHENHYDRAMINE HYDROCHLORIDE 50 MG/ML
12.5 INJECTION, SOLUTION INTRAMUSCULAR; INTRAVENOUS AS NEEDED
Status: DISCONTINUED | OUTPATIENT
Start: 2021-03-24 | End: 2021-03-24 | Stop reason: HOSPADM

## 2021-03-24 RX ORDER — MIDAZOLAM HYDROCHLORIDE 1 MG/ML
1 INJECTION, SOLUTION INTRAMUSCULAR; INTRAVENOUS AS NEEDED
Status: DISCONTINUED | OUTPATIENT
Start: 2021-03-24 | End: 2021-03-24 | Stop reason: HOSPADM

## 2021-03-24 RX ORDER — MIDAZOLAM HYDROCHLORIDE 1 MG/ML
0.5 INJECTION, SOLUTION INTRAMUSCULAR; INTRAVENOUS
Status: DISCONTINUED | OUTPATIENT
Start: 2021-03-24 | End: 2021-03-24 | Stop reason: HOSPADM

## 2021-03-24 RX ORDER — ACETAMINOPHEN 325 MG/1
650 TABLET ORAL
Status: DISCONTINUED | OUTPATIENT
Start: 2021-03-24 | End: 2021-03-25 | Stop reason: HOSPADM

## 2021-03-24 RX ORDER — AMOXICILLIN 250 MG
1 CAPSULE ORAL 2 TIMES DAILY
Status: DISCONTINUED | OUTPATIENT
Start: 2021-03-25 | End: 2021-03-25 | Stop reason: HOSPADM

## 2021-03-24 RX ORDER — OXYCODONE HYDROCHLORIDE 5 MG/1
5 TABLET ORAL AS NEEDED
Status: DISCONTINUED | OUTPATIENT
Start: 2021-03-24 | End: 2021-03-24 | Stop reason: HOSPADM

## 2021-03-24 RX ORDER — LIDOCAINE HYDROCHLORIDE 10 MG/ML
0.1 INJECTION, SOLUTION EPIDURAL; INFILTRATION; INTRACAUDAL; PERINEURAL AS NEEDED
Status: DISCONTINUED | OUTPATIENT
Start: 2021-03-24 | End: 2021-03-24 | Stop reason: HOSPADM

## 2021-03-24 RX ORDER — FENTANYL CITRATE 50 UG/ML
50 INJECTION, SOLUTION INTRAMUSCULAR; INTRAVENOUS AS NEEDED
Status: DISCONTINUED | OUTPATIENT
Start: 2021-03-24 | End: 2021-03-24 | Stop reason: HOSPADM

## 2021-03-24 RX ORDER — ONDANSETRON 2 MG/ML
4 INJECTION INTRAMUSCULAR; INTRAVENOUS AS NEEDED
Status: DISCONTINUED | OUTPATIENT
Start: 2021-03-24 | End: 2021-03-24 | Stop reason: HOSPADM

## 2021-03-24 RX ORDER — GUAIFENESIN 100 MG/5ML
81 LIQUID (ML) ORAL DAILY
Status: DISCONTINUED | OUTPATIENT
Start: 2021-03-25 | End: 2021-03-25 | Stop reason: HOSPADM

## 2021-03-24 RX ORDER — SODIUM CHLORIDE 9 MG/ML
9 INJECTION, SOLUTION INTRAVENOUS CONTINUOUS
Status: DISCONTINUED | OUTPATIENT
Start: 2021-03-24 | End: 2021-03-25 | Stop reason: HOSPADM

## 2021-03-24 RX ORDER — SODIUM CHLORIDE 0.9 % (FLUSH) 0.9 %
5-40 SYRINGE (ML) INJECTION AS NEEDED
Status: DISCONTINUED | OUTPATIENT
Start: 2021-03-24 | End: 2021-03-25 | Stop reason: HOSPADM

## 2021-03-24 RX ORDER — SODIUM CHLORIDE 9 MG/ML
25 INJECTION, SOLUTION INTRAVENOUS CONTINUOUS
Status: DISCONTINUED | OUTPATIENT
Start: 2021-03-24 | End: 2021-03-24 | Stop reason: HOSPADM

## 2021-03-24 RX ORDER — MORPHINE SULFATE 2 MG/ML
2 INJECTION, SOLUTION INTRAMUSCULAR; INTRAVENOUS
Status: DISCONTINUED | OUTPATIENT
Start: 2021-03-24 | End: 2021-03-25 | Stop reason: HOSPADM

## 2021-03-24 RX ORDER — NALOXONE HYDROCHLORIDE 0.4 MG/ML
0.4 INJECTION, SOLUTION INTRAMUSCULAR; INTRAVENOUS; SUBCUTANEOUS AS NEEDED
Status: DISCONTINUED | OUTPATIENT
Start: 2021-03-24 | End: 2021-03-25 | Stop reason: HOSPADM

## 2021-03-24 RX ORDER — GUAIFENESIN 100 MG/5ML
81 LIQUID (ML) ORAL DAILY
Status: DISCONTINUED | OUTPATIENT
Start: 2021-03-24 | End: 2021-03-24 | Stop reason: SDUPTHER

## 2021-03-24 RX ORDER — HYDRALAZINE HYDROCHLORIDE 20 MG/ML
10 INJECTION INTRAMUSCULAR; INTRAVENOUS
Status: DISCONTINUED | OUTPATIENT
Start: 2021-03-24 | End: 2021-03-25 | Stop reason: HOSPADM

## 2021-03-24 RX ORDER — ONDANSETRON 2 MG/ML
4 INJECTION INTRAMUSCULAR; INTRAVENOUS
Status: DISCONTINUED | OUTPATIENT
Start: 2021-03-24 | End: 2021-03-25 | Stop reason: HOSPADM

## 2021-03-24 RX ORDER — MORPHINE SULFATE 2 MG/ML
2 INJECTION, SOLUTION INTRAMUSCULAR; INTRAVENOUS
Status: DISCONTINUED | OUTPATIENT
Start: 2021-03-24 | End: 2021-03-24 | Stop reason: HOSPADM

## 2021-03-24 RX ADMIN — ROSUVASTATIN 10 MG: 10 TABLET, FILM COATED ORAL at 21:16

## 2021-03-24 RX ADMIN — Medication 10 ML: at 21:16

## 2021-03-24 RX ADMIN — SODIUM CHLORIDE 3 ML/KG/HR: 9 INJECTION, SOLUTION INTRAVENOUS at 09:38

## 2021-03-24 RX ADMIN — PROPOFOL 50 MG: 10 INJECTION, EMULSION INTRAVENOUS at 10:22

## 2021-03-24 RX ADMIN — DEXMEDETOMIDINE HYDROCHLORIDE 10 MCG: 100 INJECTION, SOLUTION, CONCENTRATE INTRAVENOUS at 10:40

## 2021-03-24 RX ADMIN — WATER 2 G: 1 INJECTION INTRAMUSCULAR; INTRAVENOUS; SUBCUTANEOUS at 10:30

## 2021-03-24 RX ADMIN — MIDAZOLAM 2 MG: 1 INJECTION INTRAMUSCULAR; INTRAVENOUS at 10:13

## 2021-03-24 RX ADMIN — SODIUM CHLORIDE: 900 INJECTION, SOLUTION INTRAVENOUS at 10:24

## 2021-03-24 RX ADMIN — PROTAMINE SULFATE 100 MG: 10 INJECTION, SOLUTION INTRAVENOUS at 12:11

## 2021-03-24 RX ADMIN — DEXMEDETOMIDINE HYDROCHLORIDE 10 MCG: 100 INJECTION, SOLUTION, CONCENTRATE INTRAVENOUS at 10:23

## 2021-03-24 RX ADMIN — FENTANYL CITRATE 25 MCG: 50 INJECTION, SOLUTION INTRAMUSCULAR; INTRAVENOUS at 12:31

## 2021-03-24 RX ADMIN — FENTANYL CITRATE 25 MCG: 50 INJECTION, SOLUTION INTRAMUSCULAR; INTRAVENOUS at 10:22

## 2021-03-24 RX ADMIN — PHENYLEPHRINE HYDROCHLORIDE 10 MCG/MIN: 10 INJECTION INTRAVENOUS at 10:30

## 2021-03-24 RX ADMIN — SODIUM CHLORIDE, SODIUM LACTATE, POTASSIUM CHLORIDE, AND CALCIUM CHLORIDE: 600; 310; 30; 20 INJECTION, SOLUTION INTRAVENOUS at 10:19

## 2021-03-24 RX ADMIN — WATER 2 G: 1 INJECTION INTRAMUSCULAR; INTRAVENOUS; SUBCUTANEOUS at 19:00

## 2021-03-24 RX ADMIN — LIDOCAINE HYDROCHLORIDE 0.1 ML: 10 INJECTION, SOLUTION EPIDURAL; INFILTRATION; INTRACAUDAL; PERINEURAL at 09:36

## 2021-03-24 RX ADMIN — PROPOFOL 50 MCG/KG/MIN: 10 INJECTION, EMULSION INTRAVENOUS at 10:22

## 2021-03-24 RX ADMIN — LOSARTAN POTASSIUM 25 MG: 25 TABLET, FILM COATED ORAL at 19:40

## 2021-03-24 RX ADMIN — FENTANYL CITRATE 25 MCG: 50 INJECTION, SOLUTION INTRAMUSCULAR; INTRAVENOUS at 10:32

## 2021-03-24 RX ADMIN — SODIUM CHLORIDE, POTASSIUM CHLORIDE, SODIUM LACTATE AND CALCIUM CHLORIDE 25 ML/HR: 600; 310; 30; 20 INJECTION, SOLUTION INTRAVENOUS at 09:44

## 2021-03-24 RX ADMIN — HEPARIN SODIUM 10000 UNITS: 1000 INJECTION, SOLUTION INTRAVENOUS; SUBCUTANEOUS at 11:08

## 2021-03-24 RX ADMIN — DEXMEDETOMIDINE HYDROCHLORIDE 10 MCG: 100 INJECTION, SOLUTION, CONCENTRATE INTRAVENOUS at 10:30

## 2021-03-24 RX ADMIN — FENTANYL CITRATE 25 MCG: 50 INJECTION, SOLUTION INTRAMUSCULAR; INTRAVENOUS at 10:26

## 2021-03-24 NOTE — Clinical Note
Right femoral artery. Accessed successfully. using fluoro and ultrasound guidance.  6FR X 10 CM INSERTED RFA USING 4FR Teresa Hancock MD

## 2021-03-24 NOTE — Clinical Note
Temporary pacemaker tested. Inserted through the right groin. Temporary Pacer pacing in the right ventricle. Device secured using: tegaderm. Threshold = 1 mA.

## 2021-03-24 NOTE — OP NOTES
PREOPERATIVE DIAGNOSIS:  Symptomatic severe aortic valve stenosis    POSTOPERATIVE DIAGNOSIS:  Symptomatic severe aortic valve stenosis    PROCEDURES PERFORMED:   1. Implantation of catheter-delivered prosthetic aortic heart valve (29 mm Giles 3 with 3 mL extra volume); percutaneous left femoral artery approach (CPT 23418). 2. Percutaneous left femoral artery access under fluoroscopic and ultrasound guidance. 3. Percutaneous right femoral artery access (CPT L7780994) under fluoroscopic and ultrasound guidance (CPT 67267). 4. Introduction of catheter aorta, bilateral (CPT 31840-16). 5. Aortoiliac arteriogram (CPT 15686). 6. Percutaneous right femoral venous access under fluoroscopic guidance. 7. Insertion of temporary transvenous pacemaker (CPT 69628)     CASE SUMMARY:  1. Successful percutaneous left transfemoral TAVR using a 29 mm Giles 3 THV with 3 mL extra volume  2. Brief widening of QRS immediately after valve deployment and immediately after post-dilatation with quick return to narrow QRS and no final change in native conduction immediately post-TAVR    CO-SURGEONS:   Dr. Satya Garcia. Viet Weiner Che    ASSISTING:  None    ANESTHESIA:   MAC    CLINICAL HISTORY:   Hector Gold a 70 y. o. male with severe, symptomatic aortic stenosis. He was evaluated and determined to be low risk for conventional aortic valve replacement surgery. As such, he has been consented for FDA-approved commercial TAVR use, and is now taken to the operating room for endovascular transcatheter aortic valve replacement. The risks of the procedure including death, stroke, vascular injury, the need for conversion to open surgery, transfusion and the need for permanent pacemaker were discussed with the patient and his family in detail. [facility] informed consent. Surgical priority is elective.      DESCRIPTION OF PROCEDURE:   After informed consent was obtained, the patient was brought to the operating room and positioned supine on the hybrid OR table. Prophylactic antibiotics were administered preoperatively. Invasive monitoring lines were placed by the cardiothoracic anesthesia team. The patient was prepped and draped from the chin to mid-thighs. Surgical time-out was performed. Under ultrasound guidance, the left common femoral artery is accessed percutaneously and a #6-Macanese sheath is placed. A J-tipped wire is placed in the descending thoracic aorta. Two separate Perclose devices are deployed for pre-closure. Over an Amplatz super stiff wire with a hand-fashioned J-tip the #6-Macanese sheath is exchanged for a #16-Macanese E-sheath. The sheath is flushed and then sewn into place. The right common femoral artery is accessed via micropuncture technique with radiographic and ultrasound guidance and a #6-Macanese sheath is placed there. The right femoral vein is accessed percutaneously, and a #6-Macanese venous sheath is placed under fluoroscopic guidance there. The patient is systemically heparinized with 100 units per kg of IV heparin to a goal ACT greater than 250. A transvenous pacing lead is advanced from the right femoral venous sheath up to the right ventricle. The pacemaker is tested and has good pacing capture at <1 mA. A pigtail catheter is advanced via the right femoral artery sheath over a J-tipped wire and positioned in the right-coronary sinus of Valsalva. A thoracic aortogram is obtained to determine the coplanar angle for valve implantation. Calcification of the aortic valve leaflets is evident. The aortic valve is then crossed using an AL-1 catheter and an 0.035\" straight tip guidewire. Simultaneous LV and ascending aortic pressures are recorded. A Confida 0.035\" guidewire is positioned in the LV apex. Care is taken to avoid contact with the ventricular wall by the transition point of the wire to avoid LV perforation.      A balloon aortic valvuloplasty is performed using an MycooN 25 mm x 4 mm balloon. Next, a 29 mm Giles 3 device is advanced via the #16-Japanese E-sheath and positioned in the descending aorta. The valve is mounted onto the balloon. The camera is then placed Belarusian and the valve is carefully advanced across the aortic arch while applying flexion to the delivery system to prevent trauma to the aortic arch. The camera is then placed back into the coplanar deployment angle and the valve is positioned across the aortic valve. A thoracic aortogram is obtained to correctly position the valve across the native aortic valve. Under rapid ventricular pacing at 200 beats per minute, the valve is carefully and deliberately deployed with 2 mL extra volume (35 mL total volume) and seats in a good position at a depth of 95 aortic / 5 ventricular on the non-coronary cusp side and 85 aortic / 15 ventricular on the left coronary cusp side. Transthoracic echocardiography shows excellent valve position with no significant paravalvular and trace wire-related valvular aortic insufficiency. After deployment, transvalvular gradient is measured and is minimal. The left ventricular end-diastolic pressure is similar from predeployment, with a well preserved end-diastolic pressure gradient comparing the central aortic diastolic pressure simultaneously with the LVEDP, suggesting physiologically insignificant aortic insufficiency. Thoracic aortography is performed and shows mild paravalvular aortic insufficiency, originating mainly near the non-coronary cusp.  Due to the patient's young age and robust baseline functional status, the decision is made to post-dilate the valve by adding 1 more mL to the deployment balloon (36 mL total volume).      Under rapid ventricular pacing at 200 beats per minute, the valve is carefully post-dilated with 3 mL extra volume (36 mL total volume) and the valve visually expands and foreshortens a small amount.  Repeat thoracic aortography shows a final depth of 98 aortic / 2 ventricular on the non-coronary cusp side and 85 aortic / 15 ventricular on the left coronary cusp side; excellent valve position with now trivial paravalvular aortic insufficiency.  Repeat transthoracic echocardiography shows no paravalvular or valvular aortic insufficiency. Satisfied with the valve position and function, we turn our attention to the access sites. The #16-Gibraltarian E-sheath on the left is removed and the two Perclose devices deployed. Next, the pigtail catheter is pulled down into the abdominal aorta and an iliofemoral pelvic arteriogram is taken. This demonstrates good flow bilaterally without dissection. There is an excellent pulse in the artery after repair completion. The #6-Gibraltarian right femoral arterial sheath is removed and successful hemostasis is obtained with a #6-Gibraltarian AngioSeal.    The #6-Gibraltarian right femoral venous sheath is removed and successful hemostasis is obtained with manual compression in the hybrid OR per standard protocol.     Protamine is administered and hemostasis is obtained. The intraoperative ECG monitoring at the end of the case shows NSR with a narrow QRS. No high degree AV block is present. Estimated blood loss: < 51 cc     Complications: None    Disposition: CVICU    All sponge, needle, and instrument counts are reported correct at the end of the case. The patient is taken to the CVICU in stable condition at the end of procedure.

## 2021-03-24 NOTE — CARDIO/PULMONARY
Cardiac Rehab: Trans-catheter education folder to the bedside of Prasanth Hill. Patient currently unavailable. Will follow for discussion about cardiac rehab enrollment.  Alaina Terrazas RN

## 2021-03-24 NOTE — PROGRESS NOTES
Occupational Therapy \  03/24/21    Orders received, chart review completed. Note patient POD #0 s/p TAVR. OT will follow up tomorrow for evaluation. Recommend OOB to chair three times a day for meals, self-completion of ADLs as able and medically stable.      Thank you,   Paolo Jackson, OTD, OTR/L

## 2021-03-24 NOTE — ANESTHESIA POSTPROCEDURE EVALUATION
Post-Anesthesia Evaluation and Assessment    Patient: Gaurav Love MRN: [id number]  SSN: [id number]    YOB: 1945  Age: 70 y.o. Sex: male       Cardiovascular Function/Vital Signs  Visit Vitals  BP (!) 113/56 (BP Patient Position: At rest;Supine)   Pulse 68   Temp 36.9 °C (98.4 °F)   Resp 20   Ht 6' (1.829 m)   Wt 82.6 kg (182 lb 1.6 oz)   SpO2 99%   BMI 24.70 kg/m²       Patient is status post Con-Sed anesthesia for Procedure(s):  TRANSCATHETER AORTIC VALVE REPLACEMENT LEFT TRANSFEMORAL 29 S3, TRANSTHORACIC ECHO  Transcatheter Aortic Valve Replacement. Nausea/Vomiting: None    Postoperative hydration reviewed and adequate. Pain:  Pain Scale 1: Numeric (0 - 10) (03/24/21 0906)  Pain Intensity 1: 0 (03/24/21 0906)   Managed    Neurological Status:   Neuro (WDL): Within Defined Limits (03/24/21 0921)   At baseline    Mental Status and Level of Consciousness: Alert and oriented to person, place, and time    Pulmonary Status:   O2 Device: Room air (03/24/21 0906)   Adequate oxygenation and airway patent    Complications related to anesthesia: None    Post-anesthesia assessment completed. No concerns    Signed By: Anthony Moore MD     March 24, 2021              Procedure(s):  TRANSCATHETER AORTIC VALVE REPLACEMENT LEFT TRANSFEMORAL 29 S3, TRANSTHORACIC ECHO  Transcatheter Aortic Valve Replacement.     MAC    <BSHSIANPOST>    INITIAL Post-op Vital signs:   Vitals Value Taken Time   /56 03/24/21 1345   Temp     Pulse 68 03/24/21 1345   Resp 20 03/24/21 1345   SpO2 99 % 03/24/21 1345

## 2021-03-24 NOTE — Clinical Note
Balloon removed. Balloon inflated using single inflation technique.    SERNA DELIVERY DEVICE BALLOON REMOVED OVER THE CONFIDA WIRE

## 2021-03-24 NOTE — PROGRESS NOTES
1400: Patient arrived on unit and hooked up to monitor. Groin sites clean, dry and intact without bleeding or hematoma. Patient oriented x4. RA. Sinus with 1st degree. Arterial Line present in right wrist.     1615: Removed arterial line per cardiac surgery NP. Will continue to monitor with BP cuff. Patient denies pain. Groin sites remain clean, dry and intact. 1700: Dr. Aditya Ibrahim at bedside. 1830: Started to sit patient up slowly. Groin sites remain clean, dry and intact without bleeding or hematoma. VSS. Patient eating dinner tray without difficulty. Bedside shift change report given to Tonnie Hodgkin, RN (oncoming nurse) by Tye Soares RN (offgoing nurse). Report included the following information SBAR, Kardex, ED Summary, Intake/Output, MAR and Recent Results.

## 2021-03-24 NOTE — Clinical Note
Balloon inserted. Balloon inflated using single inflation technique.    SERNA BALLOON INSERTED OVER THE CONFIDA WIRE FOR BAV

## 2021-03-24 NOTE — PROGRESS NOTES
Clinical Update:      Jeffrey Fragoso arrived to CCU from OR s/p transcatheter aortic valve replacement with 29 mm Giles 3 with 3 ml extra volume by Sebastian Mims. his  procedure was uncomplicated     Patient is groggy but wakes to speech. Bilateral groin sites soft with gauze dressing-clean, dry, intact. PPP. Anticoagulation plan for ASA only. Plan for postoperative TTE and EKG.     Visit Vitals  /75   Pulse 64   Temp 97.9 °F (36.6 °C)   Resp 16   Ht 6' (1.829 m)   Wt 182 lb 1.6 oz (82.6 kg)   SpO2 100%   BMI 24.70 kg/m²         Signed:  Juliet Hodge NP  3/24/2021  3:06 PM

## 2021-03-24 NOTE — Clinical Note
Left femoral artery. Accessed successfully. using fluoro and ultrasound guidance.  6FR X 10 CM INSERTED LFA USING 4FR Karin Joy MD

## 2021-03-24 NOTE — ANESTHESIA PROCEDURE NOTES
Arterial Line Placement    Start time: 3/24/2021 10:09 AM  End time: 3/24/2021 10:14 AM  Performed by: Jannet Escalante MD  Authorized by: Jannet Escalante MD     Pre-Procedure  Indications:  Arterial pressure monitoring and blood sampling  Preanesthetic Checklist: patient identified, risks and benefits discussed, anesthesia consent, patient being monitored, timeout performed and patient being monitored      Procedure:   Prep:  Chlorhexidine  Seldinger Technique?: Yes    Location:  Radial artery  Catheter size:  20 G  Number of attempts:  1  Cont Cardiac Output Sensor: No      Assessment:   Post-procedure:  Line secured and sterile dressing applied  Patient Tolerance:  Patient tolerated the procedure well with no immediate complications

## 2021-03-24 NOTE — ANESTHESIA PREPROCEDURE EVALUATION
Relevant Problems   No relevant active problems       Anesthetic History   No history of anesthetic complications            Review of Systems / Medical History  Patient summary reviewed, nursing notes reviewed and pertinent labs reviewed    Pulmonary  Within defined limits                 Neuro/Psych   Within defined limits           Cardiovascular      Valvular problems/murmurs: aortic stenosis      Dysrhythmias         Comments: Hx of Afib/flutter s/p ablation this year   Severe Aortic Stenosis 50% LAD     GI/Hepatic/Renal  Within defined limits              Endo/Other    Diabetes: type 2    Anemia     Other Findings   Comments: Hgb 7.0           Physical Exam    Airway  Mallampati: II  TM Distance: > 6 cm  Neck ROM: normal range of motion   Mouth opening: Normal     Cardiovascular    Rhythm: regular  Rate: normal    Murmur, Aortic area     Dental  No notable dental hx       Pulmonary  Breath sounds clear to auscultation               Abdominal  GI exam deferred       Other Findings            Anesthetic Plan    ASA: 4  Anesthesia type: MAC    Monitoring Plan: Arterial line        Anesthetic plan and risks discussed with: Patient

## 2021-03-24 NOTE — PROGRESS NOTES
Cardiac Surgery Care Coordinator- Met with Des Amor in pre-op holding. Reviewed role of the Cardiac Surgery Co- Nurse. Reviewed plan of care and discussed day of surgery expectations. Encouraged Nunesjerad Amor to verbalize and emotional support given. Will continue to follow for educational and emotional needs. Will update family throughout surgery. 1000- Met with Mrs Morenita Lopez in the main surgical waiting area, reviewed plan of care and day of surgery expectations. Will continue to follow and update. 1045- Met with Mrs Morenita Lopez and her sister, provided update  from the [facility] [address].      1245- Met with Mathieu Bradley Markley's family and Dr. Shukri Ramirez. Update given, Encouraged family to verbalize and offered emotional support. [facility] Surgical waiting room instructions, family to wait in the main surgical waiting room until contacted by the nursing staff. 1330- Escorted Mrs Morenita Lopez to the bedside in CVICU, reviewed plan of care and exchanged contact information. She is without questions at this time.  Trina Whitley RN

## 2021-03-24 NOTE — PROGRESS NOTES
Physical Therapy:     Orders received, chart review completed. Note patient POD #0 s/p TAVR. Will follow up tomorrow for PT evaluation as able and medically appropriate.      Anna Vallecillo, PT, DPT

## 2021-03-25 ENCOUNTER — APPOINTMENT (OUTPATIENT)
Dept: GENERAL RADIOLOGY | Age: 76
DRG: 267 | End: 2021-03-25
Attending: NURSE PRACTITIONER
Payer: COMMERCIAL

## 2021-03-25 ENCOUNTER — APPOINTMENT (OUTPATIENT)
Dept: NON INVASIVE DIAGNOSTICS | Age: 76
DRG: 267 | End: 2021-03-25
Attending: NURSE PRACTITIONER
Payer: COMMERCIAL

## 2021-03-25 ENCOUNTER — TRANSCRIBE ORDER (OUTPATIENT)
Dept: CARDIAC REHAB | Age: 76
End: 2021-03-25

## 2021-03-25 VITALS
BODY MASS INDEX: 23.7 KG/M2 | DIASTOLIC BLOOD PRESSURE: 82 MMHG | RESPIRATION RATE: 22 BRPM | HEIGHT: 72 IN | TEMPERATURE: 99.5 F | WEIGHT: 175 LBS | SYSTOLIC BLOOD PRESSURE: 158 MMHG | OXYGEN SATURATION: 95 % | HEART RATE: 84 BPM

## 2021-03-25 DIAGNOSIS — Z95.4 HEART VALVE REPLACED BY OTHER MEANS: Primary | ICD-10-CM

## 2021-03-25 LAB
ALBUMIN SERPL-MCNC: 3 G/DL (ref 3.5–5)
ALBUMIN/GLOB SERPL: 0.9 {RATIO} (ref 1.1–2.2)
ALP SERPL-CCNC: 87 U/L (ref 45–117)
ALT SERPL-CCNC: 26 U/L (ref 12–78)
ANION GAP SERPL CALC-SCNC: 8 MMOL/L (ref 5–15)
AST SERPL-CCNC: 31 U/L (ref 15–37)
ATRIAL RATE: 62 BPM
ATRIAL RATE: 73 BPM
BILIRUB SERPL-MCNC: 0.6 MG/DL (ref 0.2–1)
BUN SERPL-MCNC: 16 MG/DL (ref 6–20)
BUN/CREAT SERPL: 20 (ref 12–20)
CALCIUM SERPL-MCNC: 8.4 MG/DL (ref 8.5–10.1)
CALCULATED P AXIS, ECG09: 43 DEGREES
CALCULATED P AXIS, ECG09: 55 DEGREES
CALCULATED R AXIS, ECG10: 34 DEGREES
CALCULATED R AXIS, ECG10: 50 DEGREES
CALCULATED T AXIS, ECG11: 65 DEGREES
CALCULATED T AXIS, ECG11: 81 DEGREES
CHLORIDE SERPL-SCNC: 106 MMOL/L (ref 97–108)
CO2 SERPL-SCNC: 24 MMOL/L (ref 21–32)
CREAT SERPL-MCNC: 0.81 MG/DL (ref 0.7–1.3)
DIAGNOSIS, 93000: NORMAL
DIAGNOSIS, 93000: NORMAL
ECHO AO ROOT DIAM: 3.53 CM
ECHO AV AREA PEAK VELOCITY: 2.44 CM2
ECHO AV AREA/BSA PEAK VELOCITY: 1.2 CM2/M2
ECHO AV PEAK GRADIENT: 13.85 MMHG
ECHO AV PEAK VELOCITY: 186.1 CM/S
ECHO LA AREA 4C: 21.78 CM2
ECHO LA MAJOR AXIS: 4.26 CM
ECHO LA MINOR AXIS: 2.12 CM
ECHO LA VOL 2C: 83.54 ML (ref 18–58)
ECHO LA VOL 4C: 69.62 ML (ref 18–58)
ECHO LA VOL BP: 85.4 ML (ref 18–58)
ECHO LA VOL/BSA BIPLANE: 42.43 ML/M2 (ref 16–28)
ECHO LA VOLUME INDEX A2C: 41.5 ML/M2 (ref 16–28)
ECHO LA VOLUME INDEX A4C: 34.59 ML/M2 (ref 16–28)
ECHO LV E' LATERAL VELOCITY: 9.37 CM/S
ECHO LV E' SEPTAL VELOCITY: 5.46 CM/S
ECHO LV INTERNAL DIMENSION DIASTOLIC: 4.87 CM (ref 4.2–5.9)
ECHO LV INTERNAL DIMENSION SYSTOLIC: 3.32 CM
ECHO LV IVSD: 1.19 CM (ref 0.6–1)
ECHO LV MASS 2D: 237.6 G (ref 88–224)
ECHO LV MASS INDEX 2D: 118 G/M2 (ref 49–115)
ECHO LV POSTERIOR WALL DIASTOLIC: 1.31 CM (ref 0.6–1)
ECHO LVOT DIAM: 2.19 CM
ECHO LVOT PEAK GRADIENT: 5.81 MMHG
ECHO LVOT PEAK VELOCITY: 120.57 CM/S
ECHO MV A VELOCITY: 86.55 CM/S
ECHO MV AREA PHT: 2.65 CM2
ECHO MV E DECELERATION TIME (DT): 285.99 MS
ECHO MV E VELOCITY: 98.3 CM/S
ECHO MV E/A RATIO: 1.14
ECHO MV E/E' LATERAL: 10.49
ECHO MV E/E' RATIO (AVERAGED): 14.25
ECHO MV E/E' SEPTAL: 18
ECHO MV MAX VELOCITY: 90.04 CM/S
ECHO MV MEAN GRADIENT: 1.23 MMHG
ECHO MV PEAK GRADIENT: 3.24 MMHG
ECHO MV PRESSURE HALF TIME (PHT): 82.94 MS
ECHO MV VTI: 23.27 CM
ECHO PV MAX VELOCITY: 129.18 CM/S
ECHO PV PEAK INSTANTANEOUS GRADIENT SYSTOLIC: 6.68 MMHG
ECHO RV INTERNAL DIMENSION: 4.56 CM
ECHO RV TAPSE: 2.98 CM (ref 1.5–2)
ECHO TV REGURGITANT MAX VELOCITY: 286.53 CM/S
ECHO TV REGURGITANT PEAK GRADIENT: 32.84 MMHG
ERYTHROCYTE [DISTWIDTH] IN BLOOD BY AUTOMATED COUNT: 15.8 % (ref 11.5–14.5)
GLOBULIN SER CALC-MCNC: 3.3 G/DL (ref 2–4)
GLUCOSE SERPL-MCNC: 80 MG/DL (ref 65–100)
HCT VFR BLD AUTO: 28.7 % (ref 36.6–50.3)
HGB BLD-MCNC: 8.8 G/DL (ref 12.1–17)
MAGNESIUM SERPL-MCNC: 2.2 MG/DL (ref 1.6–2.4)
MCH RBC QN AUTO: 27.3 PG (ref 26–34)
MCHC RBC AUTO-ENTMCNC: 30.7 G/DL (ref 30–36.5)
MCV RBC AUTO: 89.1 FL (ref 80–99)
NRBC # BLD: 0 K/UL (ref 0–0.01)
NRBC BLD-RTO: 0 PER 100 WBC
P-R INTERVAL, ECG05: 204 MS
P-R INTERVAL, ECG05: 212 MS
PLATELET # BLD AUTO: 97 K/UL (ref 150–400)
PMV BLD AUTO: 9.9 FL (ref 8.9–12.9)
POTASSIUM SERPL-SCNC: 4.2 MMOL/L (ref 3.5–5.1)
PROT SERPL-MCNC: 6.3 G/DL (ref 6.4–8.2)
Q-T INTERVAL, ECG07: 434 MS
Q-T INTERVAL, ECG07: 474 MS
QRS DURATION, ECG06: 88 MS
QRS DURATION, ECG06: 92 MS
QTC CALCULATION (BEZET), ECG08: 478 MS
QTC CALCULATION (BEZET), ECG08: 481 MS
RBC # BLD AUTO: 3.22 M/UL (ref 4.1–5.7)
SODIUM SERPL-SCNC: 138 MMOL/L (ref 136–145)
VENTRICULAR RATE, ECG03: 62 BPM
VENTRICULAR RATE, ECG03: 73 BPM
WBC # BLD AUTO: 4.8 K/UL (ref 4.1–11.1)

## 2021-03-25 PROCEDURE — 74011250636 HC RX REV CODE- 250/636: Performed by: NURSE PRACTITIONER

## 2021-03-25 PROCEDURE — 99231 SBSQ HOSP IP/OBS SF/LOW 25: CPT | Performed by: THORACIC SURGERY (CARDIOTHORACIC VASCULAR SURGERY)

## 2021-03-25 PROCEDURE — 93306 TTE W/DOPPLER COMPLETE: CPT

## 2021-03-25 PROCEDURE — 74011250637 HC RX REV CODE- 250/637: Performed by: INTERNAL MEDICINE

## 2021-03-25 PROCEDURE — 83735 ASSAY OF MAGNESIUM: CPT

## 2021-03-25 PROCEDURE — 36415 COLL VENOUS BLD VENIPUNCTURE: CPT

## 2021-03-25 PROCEDURE — 71045 X-RAY EXAM CHEST 1 VIEW: CPT

## 2021-03-25 PROCEDURE — 97535 SELF CARE MNGMENT TRAINING: CPT

## 2021-03-25 PROCEDURE — 97161 PT EVAL LOW COMPLEX 20 MIN: CPT

## 2021-03-25 PROCEDURE — 80053 COMPREHEN METABOLIC PANEL: CPT

## 2021-03-25 PROCEDURE — 74011250637 HC RX REV CODE- 250/637: Performed by: NURSE PRACTITIONER

## 2021-03-25 PROCEDURE — 97165 OT EVAL LOW COMPLEX 30 MIN: CPT

## 2021-03-25 PROCEDURE — 97116 GAIT TRAINING THERAPY: CPT

## 2021-03-25 PROCEDURE — 85027 COMPLETE CBC AUTOMATED: CPT

## 2021-03-25 PROCEDURE — 93005 ELECTROCARDIOGRAM TRACING: CPT

## 2021-03-25 PROCEDURE — 74011000250 HC RX REV CODE- 250: Performed by: NURSE PRACTITIONER

## 2021-03-25 RX ORDER — LOSARTAN POTASSIUM 25 MG/1
25 TABLET ORAL DAILY
Qty: 90 TAB | Refills: 0 | Status: SHIPPED | OUTPATIENT
Start: 2021-03-26 | End: 2021-06-24

## 2021-03-25 RX ORDER — ASPIRIN 81 MG/1
81 TABLET ORAL DAILY
Status: SHIPPED | COMMUNITY
Start: 2021-03-26

## 2021-03-25 RX ORDER — AMOXICILLIN 250 MG
1 CAPSULE ORAL
Status: SHIPPED | COMMUNITY
Start: 2021-03-25 | End: 2021-03-29 | Stop reason: ALTCHOICE

## 2021-03-25 RX ADMIN — ASPIRIN 81 MG: 81 TABLET, CHEWABLE ORAL at 08:26

## 2021-03-25 RX ADMIN — WATER 2 G: 1 INJECTION INTRAMUSCULAR; INTRAVENOUS; SUBCUTANEOUS at 02:41

## 2021-03-25 RX ADMIN — Medication 10 ML: at 08:28

## 2021-03-25 RX ADMIN — LOSARTAN POTASSIUM 25 MG: 25 TABLET, FILM COATED ORAL at 08:26

## 2021-03-25 RX ADMIN — DOCUSATE SODIUM 50 MG AND SENNOSIDES 8.6 MG 1 TABLET: 8.6; 5 TABLET, FILM COATED ORAL at 08:26

## 2021-03-25 RX ADMIN — AMIODARONE HYDROCHLORIDE 200 MG: 200 TABLET ORAL at 08:26

## 2021-03-25 RX ADMIN — PANTOPRAZOLE SODIUM 40 MG: 40 TABLET, DELAYED RELEASE ORAL at 08:26

## 2021-03-25 RX ADMIN — Medication 400 MG: at 08:26

## 2021-03-25 NOTE — PROGRESS NOTES
0800: Bedside report received from [facility], assumed care of pt.  1115: I have reviewed discharge instructions with the patient and spouse. The patient and spouse verbalized understanding. Discharge medications reviewed with patient and spouse and appropriate educational materials and side effects teaching were provided. Pt discharged via wheelchair with all personal belongings.

## 2021-03-25 NOTE — DISCHARGE SUMMARY
[facility] Discharge Summary     Patient ID:  Des Amor  [id number]  41 y.o.  1945    Admit date: 3/24/2021    Discharge date: 3/25/2021     Admitting Physician: Lawrence Davies MD     Referring Cardiologist:  Dr. Shukri Ramirez    PCP:  Stephy Escalante MD     Admitting Diagnoses: Aortic Stenosis    Discharge Diagnoses: AS s/p TAVR    Hospital Problems  Date Reviewed: 3/24/2021          Codes Class Noted POA    Aortic stenosis ICD-10-CM: I35.0  ICD-9-CM: 424.1  3/24/2021 Unknown        Severe aortic stenosis ICD-10-CM: I35.0  ICD-9-CM: 424.1  3/22/2021 Unknown              Discharged Condition: stable    Disposition: home, see patient instructions for treatment and plan    Procedures for this admission:  Procedure(s):  TRANSCATHETER AORTIC VALVE REPLACEMENT LEFT TRANSFEMORAL 29 S3, TRANSTHORACIC ECHO  Transcatheter Aortic Valve Replacement    Discharge Medications:      My Medications      START taking these medications      Instructions Each Dose to Equal Morning Noon Evening Bedtime   aspirin 81 mg chewable tablet  Start taking on: March 26, 2021    Your last dose was: Your next dose is: Take 1 Tab by mouth daily. 81 mg                 losartan 25 mg tablet  Commonly known as: COZAAR  Start taking on: March 26, 2021    Your last dose was: Your next dose is: Take 1 Tab by mouth daily for 90 days. 25 mg                 senna-docusate 8.6-50 mg per tablet  Commonly known as: PERICOLACE    Your last dose was: Your next dose is: Take 1 Tab by mouth daily as needed for Constipation. 1 Tab                    CONTINUE taking these medications      Instructions Each Dose to Equal Morning Noon Evening Bedtime   amiodarone 200 mg tablet  Commonly known as: CORDARONE    Your last dose was: Your next dose is: Take 200 mg by mouth. 200 mg                 Ferrous Fumarate 325 mg (106 mg iron) Tab    Your last dose was: Your next dose is:          Take 1 Tab by mouth two (2) times a day for 30 days. 1 Tab                 metFORMIN 500 mg tablet  Commonly known as: GLUCOPHAGE    Your last dose was: Your next dose is: Take 500 mg by mouth daily (with breakfast). 500 mg                 multivitamin tablet  Commonly known as: ONE A DAY    Your last dose was: Your next dose is: Take 1 Tab by mouth daily. 1 Tab                 pantoprazole 40 mg tablet  Commonly known as: PROTONIX    Your last dose was: Your next dose is: Take 40 mg by mouth daily. 40 mg                 rosuvastatin 10 mg tablet  Commonly known as: CRESTOR    Your last dose was: Your next dose is: Take 1 Tab by mouth nightly. Indications: hardening of the arteries due to plaque buildup   10 mg                       Where to Get Your Medications      These medications were sent to [facility]/pharmacy #[id number]- [place], [address]  [facility]    Phone: [phone]   · losartan 25 mg tablet       HPI: ** The following info was taken from prior office consult note.       Mayra Jackson is a 70 y.o. male  with PMHx of Aortic Stenosis, Atrial Fibrillation with recent Ablation on Xarelto, GI Bleed, HTN, Colon polyps, DM II, that is referred to the [address] [place] [address] by Dr. Anita Garza interventional evaluation of  Aortic Stenosis.     The patient is currently admitted for treatment and work up [address] [place] [address] with Heme + stool and with Hgb of 4.2 on admit. He received 4 units of PRBCs on this admission. His Xarelto was placed on hold and has underwent an EGD and Colonoscopy. On Colonoscopy he was found to have a 4-5 cm mass which was biopsied-results pending and multiple colon polyps.  General Surgery has consulted for rectal mass and awaiting surgical recommendations from Dr. Susan Wooten.     Prior to this acute episode, the patient reports that he used to go to the [employer] routinely for exercise. Since COVID, however, he has remained at home and felt that his decreased activity was a product of his reduction in exercise. 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 He takes his dog for walks of about a mile and has found that he has gotten more tired with over the last several months. He denies worsening fatigue, palpitation, chest pain, syncope or falls, orthopnea, PND, LE edema. He did note some dizziness but only recently. He started Xarelto over the last month due to new diagnosis of atrial fibrillation and subsequent ablation. He said that he has never noticed blood in his stool.       Hospital Course: Jeffrey Fragoso was taken to the OR on 3/24/21 for a transcatheter aortic valve replacement with 29 mm Giles 3 with 3 ml extra volume by Sebastian Mims. His procedure was uncomplicated. He was monitored overnight. He had a postoperative EKG and Echo completed and reviewed by Dr. Ellen Daugherty. He was felt stable for discharge on POD #1 with the following assessment and plan:      1. Aortic Stenosis severe and symptomatic with moderate AI: s/p TAVR 3/24/21 w/ Dr. Gigi Colvin and Dr. Ellen Daugherty. ASA only. EKG with new 1st degree HB but HI decreased this morning from immediate postoperative EKG. Echo this morning then plan for discharge.     2. Anemia due to acute blood loss POA from previous GI bleeding: Underwent EGD on previous admission-no acute bleeding noted, and Colonoscopy +  Rectal mass. On PPI, oral iron. Received 4 units PRBCs during previous admission and Xarelto stopped. Hgb has remained stable on this admission. Continue to monitor.      3. Rectal mass found on Colonoscopy: Surgery consulted -- paths all report tubular adenoma.  Needs outpt surgical FU for resection plans.      4. Atrial fibrillation s/p Ablation in Feb 2021: Had been on Xarelto but was discontinued due to GIB. On Amiodarone-restarted following TAVR.     5. HTN:  Started on Losartan per Cardiology.     6.  HLD: On Statin     7. DM II: On Metformin PTA -will restart on DC.  Pt's wife reports a healing diabetic ulcer on R great toe.      8. Anesthesia history: Pt's wife reports during recent colonscopy w/ sedation, there was concern for ISABEL.  She asked this please be shared with anesthesia staff-and they were informed. Referral to outpatient cardiac rehab made. Discharge Vital Signs:   Visit Vitals  BP (!) 158/82   Pulse 84   Temp 99.5 °F (37.5 °C)   Resp 22   Ht 6' (1.829 m)   Wt 175 lb (79.4 kg)   SpO2 (!) 71%   BMI 23.73 kg/m²       Labs:   Recent Labs     03/25/21  0252 03/24/21  1356 03/24/21  0946   WBC 4.8 5.9  --    HGB 8.8* 8.3*  --    HCT 28.7* 26.5*  --    PLT 97* 100*  --     138  --    K 4.2 4.4  --    BUN 16 16  --    CREA 0.81 0.81  --    GLU 80 77  --    GLUCPOC  --   --  105*   INR  --  1.1  --        Diagnostics:   CXR Results  (Last 48 hours)               03/25/21 0605  XR CHEST PORT Final result    Impression:  No evidence of acute cardiopulmonary process. Narrative:  INDICATION: Postop heart surgery. COMPARISON: 3/24/2021       FINDINGS: AP portable imaging of the chest performed at 5:19 AM demonstrates a   stable cardiomediastinal silhouette. The lungs appear clear bilaterally,   although the costophrenic angles are excluded bilaterally. No significant   osseous abnormalities are seen. 03/24/21 1414  XR CHEST PORT Final result    Impression:  1. The lungs are clear       Narrative:  EXAM: XR CHEST PORT       INDICATION: postop heart surgery, aortic valve replacement       COMPARISON: 3/14/2021       FINDINGS: A portable AP radiograph of the chest was obtained at 1409 hours. The   patient is on a cardiac monitor. The patient is status post transcatheter aortic   valve replacement. Heart size is normal. Lungs are clear. No pneumothorax. Patient Instructions/Follow Up Care:  Discharge instructions were reviewed with the patient and family present. Questions were also answered at this time. Prescriptions and medications were reviewed. The patient has a follow up appointment with the Nurse Practitioner or [de-identified] Assistant on 3/29/21 at 21 [phone] by telephone and with Dr. Dawson Lo on 4/22/21 at 11am in the clinic. The patient was also instructed to follow up with his primary care physician as needed. The patient and family were encouraged to call with any questions or concerns.        Signed:  Lita Maria NP  3/25/2021  9:42 AM

## 2021-03-25 NOTE — PROGRESS NOTES
Pt seen and examined post TAVR  NSR with 1st degree block  Up in chair comfortable with no complaints  Groins clean and dry  Awaiting echo   Resumed amio   Dc later today

## 2021-03-25 NOTE — PROGRESS NOTES
OCCUPATIONAL THERAPY EVALUATION/DISCHARGE  Patient: Jeffrey Fragoso (29 y.o. male)  Date: 3/25/2021  Primary Diagnosis: Aortic stenosis [I35.0]  Procedure(s) (LRB):  TRANSCATHETER AORTIC VALVE REPLACEMENT LEFT TRANSFEMORAL 29 S3, TRANSTHORACIC ECHO (Bilateral)  Transcatheter Aortic Valve Replacement (N/A) 1 Day Post-Op   Precautions:  Fall    ASSESSMENT  Based on the objective data described below, the patient presents with generalized weakness, impaired LUE ROM (baseline), and decreased activity tolerance s/p TAVR POD 1. PTA, patient was highly independent with ADL and IADL tasks and works for [employer] in [place], [place]. Patient demonstrated good understanding of compensatory training for LB ADLs post-op and verbalized understanding of feeling groin incision site for lumps/bumps (hematomas). Patient asking questions appropriately related to endurance training and functional activity after discharge. Patient safe to discharge home with wife and no further OT needs. Current Level of Function (ADLs/self-care): independent-supervision     Functional Outcome Measure: The patient scored 95/100 on the Barthel Index outcome measure which is indicative of 5% ADL impairment. Other factors to consider for discharge: home support available     PLAN :  Recommend with staff: OOB x 3 to chair    Recommendation for discharge: (in order for the patient to meet his/her long term goals)  No skilled occupational therapy/ follow up rehabilitation needs identified at this time. This discharge recommendation:  Has been made in collaboration with the attending provider and/or case management    IF patient discharges home will need the following DME: none       SUBJECTIVE:   Patient stated I am impressed with my doctors.     OBJECTIVE DATA SUMMARY:   HISTORY:   Past Medical History:   Diagnosis Date    Aortic regurgitation     Aortic stenosis     Atrial fibrillation (HCC)     Colon polyps     Diabetes mellitus, type 2 ([facility] [address]     GI bleed     HTN (hypertension)      Past Surgical History:   Procedure Laterality Date    COLONOSCOPY N/A 3/16/2021    COLONOSCOPY [facility] Yadira Pryor [facility] at [facility] ENDOSCOPY    HX AFIB ABLATION  02/2021       Prior Level of Function/Environment/Context: PTA, patient was independent with ADL and IADL tasks. Expanded or extensive additional review of patient history:   Home Situation  Home Environment: Private residence  # Steps to Enter: 4  Rails to Enter: Yes  Hand Rails : Bilateral  One/Two Story Residence: Two story  # of Interior Steps: 12  Interior Rails: Left  Living Alone: No  Support Systems: Spouse/Significant Other/Partner  Patient Expects to be Discharged to[de-identified] Private residence  Current DME Used/Available at Home: None  Tub or Shower Type: Tub/Shower combination    EXAMINATION OF PERFORMANCE DEFICITS:  Cognitive/Behavioral Status:  Neurologic State: Alert  Orientation Level: Oriented X4  Cognition: Appropriate for age attention/concentration  Perception: Appears intact  Perseveration: No perseveration noted  Safety/Judgement: Insight into deficits    Skin: exposed areas grossly intact    Edema: slightly BLE    Hearing: Auditory  Auditory Impairment: None    Vision/Perceptual:                                Corrective Lenses: Glasses    Range of Motion:  BUE  AROM: Generally decreased, functional(limited in LUE to 90 degrees shoulder flexion)  PROM: Generally decreased, functional                      Strength:  BUE  Strength: Generally decreased, functional                Coordination:  Coordination: Generally decreased, functional  Fine Motor Skills-Upper: Left Intact; Right Intact    Gross Motor Skills-Upper: Left Intact; Right Intact    Tone & Sensation:  BUE  Tone: Normal  Sensation: Intact                      Balance:  Sitting: Intact; Without support  Standing: Impaired; Without support  Standing - Static: Good  Standing - Dynamic : Fair    Functional Mobility and Transfers for ADLs:  Bed Mobility:  Sit to Supine: Supervision    Transfers:  Sit to Stand: Supervision  Stand to Sit: Supervision    ADL Assessment:  Feeding: Independent    Oral Facial Hygiene/Grooming: Independent    Bathing: Supervision(infer 2* standing balance/groin incision care)    Upper Body Dressing: Independent    Lower Body Dressing: Supervision(cues for seated LB dressing )    Toileting: Independent                ADL Intervention and task modifications:                           Lower Body Dressing Assistance  Socks: Supervision; Compensatory technique training         Cognitive Retraining  Safety/Judgement: Insight into deficits    Functional Measure:  Barthel Index:    Bathin  Bladder: 10  Bowels: 10  Groomin  Dressing: 10  Feeding: 10  Mobility: 15  Stairs: 10  Toilet Use: 10  Transfer (Bed to Chair and Back): 15  Total: 95/100        The Barthel ADL Index: Guidelines  1. The index should be used as a record of what a patient does, not as a record of what a patient could do. 2. The main aim is to establish degree of independence from any help, physical or verbal, however minor and for whatever reason. 3. The need for supervision renders the patient not independent. 4. A patient's performance should be established using the best available evidence. Asking the patient, friends/relatives and nurses are the usual sources, but direct observation and common sense are also important. However direct testing is not needed. 5. Usually the patient's performance over the preceding 24-48 hours is important, but occasionally longer periods will be relevant. 6. Middle categories imply that the patient supplies over 50 per cent of the effort. 7. Use of aids to be independent is allowed. Ruperto Ramos., Barthel, D.W. (6495). Functional evaluation: the Barthel Index. [address] [facility] (14)2. NNEKA Avelar, Suzy Felton., [facility] Prisca.[facility], [address] [place] [address] ().  Measuring the change indisability after inpatient rehabilitation; comparison of the responsiveness of the Barthel Index and Functional Delia Measure. Journal of Neurology, Neurosurgery, and Psychiatry, 66(4), 673-251. DENNY San.ANITRA, MICHAEL Palomo, & Rosa Izquierdo M.A. (2004.) Assessment of post-stroke quality of life in cost-effectiveness studies: The usefulness of the Barthel Index and the EuroQoL-5D. Quality of Life Research, 15, 750-10       Occupational Therapy Evaluation Charge Determination   History Examination Decision-Making   LOW Complexity : Brief history review  LOW Complexity : 1-3 performance deficits relating to physical, cognitive , or psychosocial skils that result in activity limitations and / or participation restrictions  LOW Complexity : No comorbidities that affect functional and no verbal or physical assistance needed to complete eval tasks       Based on the above components, the patient evaluation is determined to be of the following complexity level: LOW   Pain Rating:  None reported. Activity Tolerance:   Good and requires increased time for functional mobility    After treatment patient left in no apparent distress:    Supine in bed, Heels elevated for pressure relief, Call bell within reach and Side rails x 3    COMMUNICATION/EDUCATION:   The patients plan of care was discussed with: Physical therapist and Registered nurse.      Thank you for this referral.  Hieu Zurita  Time Calculation: 16 mins

## 2021-03-25 NOTE — PROGRESS NOTES
Cardiac Surgery Care Coordinator-  Met with Naina To, reviewed plan of care and discharge instruction Discussed groin site care and reviewed signs and symptoms of infection. Red reminder bracelet on right wrist, reviewed purpose of the bracelet and when to call the MD. Provided Mrs Tari Nguyen with the valve ID card yesterday and provided Mr Tari Nguyen with the dental prophylaxis card. Reminded pt of appts and encouraged participation in the Cardiac Wellness and rehab program after discharge. Encouraged Naina To to verbalize and emotional support given. Naina To is without questions or concerns at this time.  Titi Blackmon RN

## 2021-03-25 NOTE — CARDIO/PULMONARY
Cardiac Rehab: Trans-catheter education folder is at the bedside of Diana Cunningham. Patient is preparing for discharge. Teach back used for education. Reviewed signs and symptoms of infection, daily temperature monitoring, and when to call the physician. Red reminder bracelet is on the right wrist and reviewed purpose of bracelet. Emphasized the value of cardiac rehab. Discussed Cardiac Rehab Program format, benefits, and encouraged enrollment to assist with risk modification and management. Patient is a member of [employer] and [employer]" so discussed options for abbreviated program to build confidence and help ease back into gym routine as he \"has not been exercising regularly for some time now, since covid began\". Reviewed cardiac rehab flyer and contact number. Patient requested a follow-up call the beginning of next week to discuss enrollment. Will follow the week of 3/29/2021. Diana Cunningham verbalized understanding with questions answered.  Allen Holstein, RN

## 2021-03-25 NOTE — DISCHARGE INSTRUCTIONS
Cardiac Surgery Specialist    [address] [place] [address]                                       [address] [place], [place] [address] Road, [address] [facility]  Office- [phone]  Fax- [phone]       Office- [phone]  Fax- [phone]  _____________________________________________________________  Dr. Dion Tate, FNP       Michel Anna, NP  Dr. Guzman Snow, NP          Sadie Flores, NP  Dr. Pau Guzman, NP          Macey Betancur, [address] [place] [address], PACARMELA Pardo, PACARMELA Allen PA-C  ____________________________________________________________     Name:Bryce Stephen     Surgery & Date: Procedure(s):  TRANSCATHETER AORTIC VALVE REPLACEMENT LEFT TRANSFEMORAL 29 S3, TRANSTHORACIC ECHO  Transcatheter Aortic Valve Replacement    Discharge Date: 03/25/21     MEDICATIONS:  Please refer to your After Visit Summary for your medication list.     DO NOT TAKE ANY MEDICATIONS THAT ARE NOT ON THIS LIST    INSTRUCTIONS:  1. NO SMOKING OR TOBACCO PRODUCTS  2. Do not follow the activity/exercise instructions in your discharge book given to you as an inpatient. You have no activity restrictions. 3. You may shower. Wash all incisions twice daily with mild soap and water. No lotions, ointments or powder. 4. Call the office immediately for any redness, swelling, or drainage from your incision. 5. Take your temperature daily and call for a temperature of 101 degrees or higher or for any symptoms that make you think you have and infection. 6. Weigh yourself each morning. Call if you gain more than 5 pounds in 48 hours. 7. Use the incentive spirometer 6-8 times a day-10 breaths each time. 8. Walk several hundred feet several times daily. DIET  Eat an American Heart Association diet.   If you are having trouble with your appetite, eat what you can.  Try eating small, frequent meals throughout the day. ACTIVITY  1. You have no activity restrictions. You may resume your daily activities at home, based on your comfort level. You may also drive. FOLLOW UP  1. Your first follow up appointment will be on 3/29/21 at 1030am by telephone. Our office is located in [address] [place] [address]. Your second follow up appointment will be in four weeks, on 4/22/21 at 11am in the clinic. You will need to have an ECHO prior to your appointment time. Our office will set that up for you. Please call our office at [phone] if you are unable to make either one of these appointments. 2. You will be receiving a call before your 3 day follow up appointment to begin cardiac rehab. They are programs located at the [address] [place] [address].  The contact information is located in your Cardiac Surgery booklet. Please call if you have not been contacted 2-3 weeks after discharge from the hospital.  3. We will make an appointment for you with your cardiologist in 4-5 weeks. 4. Consult you primary care physician regarding your influenza &   pneumovax vaccines. 5.   Please bring all medications with you to your appointment.     Signature:___________________________________________________

## 2021-03-25 NOTE — PROGRESS NOTES
Orders received, chart reviewed and patient evaluated by occupational therapy. Pending progression with skilled acute occupational therapy, recommend:  No skilled occupational therapy/ follow up rehabilitation needs identified at this time. Recommend with nursing ADLs with supervision/setup, OOB to chair 3x/day and toileting via functional mobility to and from bathroom 1 assist. Thank you for completing as able in order to maintain patient strength, endurance and independence. Full evaluation to follow.

## 2021-03-25 NOTE — PROGRESS NOTES
Problem: Patient Education: Go to Patient Education Activity  Goal: Patient/Family Education  Outcome: Progressing Towards Goal     Problem: Post-procedure,Day of TAVR  Goal: *Stable Cardiac Rhythm  Outcome: Progressing Towards Goal  Goal: *Hemodynamically stable  Outcome: Progressing Towards Goal  Goal: *Optimal pain control at patient's stated goal  Outcome: Progressing Towards Goal  Goal: *Lungs clear or at baseline  Outcome: Progressing Towards Goal  Goal: *Demonstrates progressive activity  Outcome: Progressing Towards Goal  Goal: *Procedure site is without bleeding and signs of infection six hours post sheath removal  Outcome: Progressing Towards Goal  Goal: *Pulses palpable, skin color within defined limits, skin temperature warm  Outcome: Progressing Towards Goal  Goal: *Bilateral lower extremities neurovascularly intact  Outcome: Progressing Towards Goal  Goal: Activity/Safety  Outcome: Progressing Towards Goal  Goal: Consults  Outcome: Progressing Towards Goal  Goal: Diagnostic Tests/Procedures  Outcome: Progressing Towards Goal  Goal: Nutrition/Diet  Outcome: Progressing Towards Goal  Goal: Discharge Planning  Outcome: Progressing Towards Goal  Goal: Medications  Outcome: Progressing Towards Goal  Goal: Respiratory  Outcome: Progressing Towards Goal  Goal: Treatments/Interventions/Procedures  Outcome: Progressing Towards Goal  Goal: Psychosocial Needs  Outcome: Progressing Towards Goal

## 2021-03-25 NOTE — PROGRESS NOTES
PHYSICAL THERAPY EVALUATION/DISCHARGE  Patient: Jeannette Foreman (77 y.o. male)  Date: 3/25/2021  Primary Diagnosis: Aortic stenosis [I35.0]  Procedure(s) (LRB):  TRANSCATHETER AORTIC VALVE REPLACEMENT LEFT TRANSFEMORAL 29 S3, TRANSTHORACIC ECHO (Bilateral)  Transcatheter Aortic Valve Replacement (N/A) 1 Day Post-Op   Precautions:   Fall      ASSESSMENT  Based on the objective data described below, the patient presents with baseline mobility demonstrating good strength, balance, and endurance following admission for a TAVR. He was independent and active prior to admission. During evaluation, he was able to independently ambulate 150' without DME demonstrating a good alexander and no LOB. He safely ascended/descended 11 stairs modified independently with 1 rail. Discussed activity progression at home and encouraged progressive ambulation to mild/moderate MANCIA. Further skilled acute physical therapy is not indicated at this time. PLAN :  Recommendation for discharge: (in order for the patient to meet his/her long term goals)  No skilled physical therapy/ follow up rehabilitation needs identified at this time. This discharge recommendation:  Has been made in collaboration with the attending provider and/or case management    IF patient discharges home will need the following DME: none       SUBJECTIVE:   Patient stated I feel pretty good.     OBJECTIVE DATA SUMMARY:   HISTORY:    Past Medical History:   Diagnosis Date    Aortic regurgitation     Aortic stenosis     Atrial fibrillation (HCC)     Colon polyps     Diabetes mellitus, type 2 (HCC)     GI bleed     HTN (hypertension)      Past Surgical History:   Procedure Laterality Date    COLONOSCOPY N/A 3/16/2021    COLONOSCOPY performed by Xenia Pearce [facility] at [facility] ENDOSCOPY    HX AFIB ABLATION  02/2021       Prior level of function:     Personal factors and/or comorbidities impacting plan of care:     Home Situation  Home Environment: Private residence  # Steps to Enter: 4  Rails to Enter: Yes  Hand Rails : Bilateral  One/Two Story Residence: Two story  # of Interior Steps: 15  Interior Rails: Left  Living Alone: No  Support Systems: Spouse/Significant Other/Partner  Patient Expects to be Discharged to[de-identified] Private residence  Current DME Used/Available at Home: None  Tub or Shower Type: Tub/Shower combination    EXAMINATION/PRESENTATION/DECISION MAKING:   Critical Behavior:  Neurologic State: Alert  Orientation Level: Oriented X4  Cognition: Appropriate for age attention/concentration  Safety/Judgement: Insight into deficits  Hearing: Auditory  Auditory Impairment: None  Skin:    Edema:   Range Of Motion:  AROM: Generally decreased, functional(limited in LUE to 90 degrees shoulder flexion)           PROM: Generally decreased, functional           Strength:    Strength: Generally decreased, functional                    Tone & Sensation:   Tone: Normal              Sensation: Intact               Coordination:  Coordination: Generally decreased, functional  Vision:   Corrective Lenses: Glasses  Functional Mobility:  Bed Mobility:        Sit to Supine: Supervision     Transfers:  Sit to Stand: Supervision  Stand to Sit: Supervision                       Balance:   Sitting: Intact; Without support  Standing: Impaired; Without support  Standing - Static: Good  Standing - Dynamic : Fair  Ambulation/Gait Training:  Distance (ft): 150 Feet (ft)  Assistive Device: Gait belt  Ambulation - Level of Assistance: Independent     Gait Description (WDL): Exceptions to WDL                 Speed/Lesley: Fluctuations                        Stairs:  Number of Stairs Trained: 11  Stairs - Level of Assistance: Modified independent   Rail Use: Right     Therapeutic Exercises:       Functional Measure:         Physical Therapy Evaluation Charge Determination   History Examination Presentation Decision-Making   LOW Complexity : Zero comorbidities / personal factors that will impact the outcome / POC LOW Complexity : 1-2 Standardized tests and measures addressing body structure, function, activity limitation and / or participation in recreation  LOW Complexity : Stable, uncomplicated  LOW Complexity : FOTO score of       Based on the above components, the patient evaluation is determined to be of the following complexity level: LOW     Pain Ratin/10    Activity Tolerance:   Good      After treatment patient left in no apparent distress:   Supine in bed and Call bell within reach    COMMUNICATION/EDUCATION:   The patients plan of care was discussed with: Registered nurse. Fall prevention education was provided and the patient/caregiver indicated understanding., Patient/family have participated as able in goal setting and plan of care. and Patient/family agree to work toward stated goals and plan of care.     Thank you for this referral.  Rosa Richardson, PT, DPT   Time Calculation: 19 mins

## 2021-03-25 NOTE — PROGRESS NOTES
Reason for Admission:  Patient is post-op day 1 s/p TAVR                     RUR Score:   12% risk of re-admission                PCP: First and Last name:   Kiera Huertas MD     Name of Practice: [facility]   Are you a current patient: Yes/No: Yes   Approximate date of last visit: Within the past year   Can you participate in a virtual visit if needed: N/A    Do you (patient/family) have any concerns for transition/discharge? None identified, the patient denied any concerns for transition home               Plan for utilizing home health: The patient has no home health needs     Current Advanced Directive/Advance Care Plan:  Full Code      Healthcare Decision Maker:   Click here to complete [employer] including selection of the Healthcare Decision Maker Relationship (ie \"Primary\") Spouse is legal NOK              Transition of Care Plan:  Home    The CM is familiar with patient from previous admission, 3/14-3/18/2021- this is a planned re-admission for TAVR. The patient lives at home with his wife- verified address and insurance information. The patient is independent with ADLs and mobility, denied any concerns for transition home today- no DME utilized in the home. The patient utilizes [facility] pharmacy for prescriptions. The patient's spouse will transport home today. PT/OT endorse no needs upon discharge- no home health needs identified. Agata Torres MSW    Care Management Interventions  PCP Verified by CM: Yes(PCP is Dr. Vernon Tong )  Mode of Transport at Discharge:  Other (see comment)(Spouse will transport )  Transition of Care Consult (CM Consult): Discharge Planning  MyChart Signup: Yes  Physical Therapy Consult: Yes  Occupational Therapy Consult: Yes  Current Support Network: Lives with Spouse, Own Home  Confirm Follow Up Transport: Family  Discharge Location  Discharge Placement: Home

## 2021-03-25 NOTE — PROGRESS NOTES
2300: Report received from [facility]. Patient care assumed. 0000: Bilateral groins CDI, no hematoma or bleeding. 0400: No change to groin sites. CDI.     0800: Bedside and Verbal shift change report given to Dino Burger RN (oncoming nurse) by Brandan Hendrickson RN (offgoing nurse). Report included the following information SBAR, Kardex, Procedure Summary, Intake/Output, MAR, Recent Results, Cardiac Rhythm 1st degree AVB and Alarm Parameters .

## 2021-03-25 NOTE — PROGRESS NOTES
[facility] Cardiology Progress Note    Date of Service: 3/25/2021    Subjective:  No acute events overnight. Denies chest pain, dyspnea, lightheadedness, syncope. BP trended up and started on losartan 25 mg daily first dose yesterday evening. Feels well, no complaints.     Objective:    Visit Vitals  BP (!) 158/82   Pulse 84   Temp 99.5 °F (37.5 °C)   Resp 22   Ht 6' (1.829 m)   Wt 79.4 kg (175 lb)   SpO2 95%   BMI 23.73 kg/m²         Intake/Output Summary (Last 24 hours) at 3/25/2021 1131  Last data filed at 3/25/2021 1010  Gross per 24 hour   Intake 1436.67 ml   Output 875 ml   Net 561.67 ml        Physical Exam  GEN: NAD, appears stated age  HEENT: EOMI, MMM, OP clear  NECK: Normal JVP  CV: RRR, normal S1 and S2, no M/R/G  LUNGS: CTAB, no W/R/R  ABD: NABS, soft, NT/ND  EXT: No edema, 2+ distal pulses  PSYCH: Mood and affect normal  NEURO: AAO, MAEW, face symmetrical, speech intact    Current Facility-Administered Medications   Medication Dose Route Frequency    pantoprazole (PROTONIX) tablet 40 mg  40 mg Oral DAILY    rosuvastatin (CRESTOR) tablet 10 mg  10 mg Oral QHS    sodium chloride (NS) flush 5-40 mL  5-40 mL IntraVENous Q8H    sodium chloride (NS) flush 5-40 mL  5-40 mL IntraVENous PRN    0.45% sodium chloride infusion  10 mL/hr IntraVENous CONTINUOUS    0.9% sodium chloride infusion  9 mL/hr IntraVENous CONTINUOUS    acetaminophen (TYLENOL) tablet 650 mg  650 mg Oral Q4H PRN    traMADoL (ULTRAM) tablet  mg   mg Oral Q6H PRN    oxyCODONE-acetaminophen (PERCOCET) 5-325 mg per tablet 1-2 Tab  1-2 Tab Oral Q4H PRN    morphine injection 2 mg  2 mg IntraVENous Q2H PRN    naloxone (NARCAN) injection 0.4 mg  0.4 mg IntraVENous PRN    ceFAZolin (ANCEF) 2 g in sterile water (preservative free) 20 mL IV syringe  2 g IntraVENous Q8H    ondansetron (ZOFRAN) injection 4 mg  4 mg IntraVENous Q4H PRN    albuterol (PROVENTIL VENTOLIN) nebulizer solution 2.5 mg  2.5 mg Nebulization Q4H PRN    aspirin chewable tablet 81 mg  81 mg Oral DAILY    magnesium oxide (MAG-OX) tablet 400 mg  400 mg Oral BID    calcium chloride 1 g in 0.9% sodium chloride 100 mL IVPB  1 g IntraVENous PRN    bisacodyL (DULCOLAX) suppository 10 mg  10 mg Rectal DAILY PRN    senna-docusate (PERICOLACE) 8.6-50 mg per tablet 1 Tab  1 Tab Oral BID    ELECTROLYTE REPLACEMENT NOTE: Nurse to review Serum Potassium and Magnesuim levels and Initiate Electrolyte Replacement Protocol as needed  1 Each Other PRN    amiodarone (CORDARONE) tablet 200 mg  200 mg Oral DAILY    hydrALAZINE (APRESOLINE) 20 mg/mL injection 10 mg  10 mg IntraVENous Q6H PRN    losartan (COZAAR) tablet 25 mg  25 mg Oral DAILY    niCARdipine (CARDENE) 25 mg in 0.9% sodium chloride 250 mL infusion  0-15 mg/hr IntraVENous TITRATE    PHENYLephrine (FINA-SYNEPHRINE) 30 mg in 0.9% sodium chloride 250 mL infusion   mcg/min IntraVENous TITRATE       Data Reviewed:  Recent Labs     03/25/21  0252 03/24/21  1356    138   K 4.2 4.4    110*   CO2 24 25   GLU 80 77   BUN 16 16   CREA 0.81 0.81   CA 8.4* 8.4*   MG 2.2 2.2   ALB 3.0* 2.8*   ALT 26 27   INR  --  1.1     Recent Labs     03/25/21  0252 03/24/21  1356   WBC 4.8 5.9   HGB 8.8* 8.3*   HCT 28.7* 26.5*   PLT 97* 100*     Lab Results   Component Value Date/Time    Specimen Description: NARES 07/18/2009 08:10 PM     Lab Results   Component Value Date/Time    Culture result:  07/18/2009 08:10 PM     MRSA NOT PRESENT      Screening of patient nares for MRSA is for surveillance purposes and, if positive, to facilitate isolation considerations in high risk settings. It is not intended for automatic decolonization interventions per se as regimens are not sufficiently effective to warrant routine use.        All Cardiac Markers in the last 24 hours:  No results found for: CPK, CK, CKMMB, CKMB, RCK3, CKMBT, CKMBPOC, CKNDX, CKND1, ARTI, TROPT, TROIQ, RAMÍREZ, TROPT, TNIPOC, BNP, BNPP, BNPNT    Telemetry (personally reviewed): normal sinus rhythm    Echocardiogram:  03/24/21   ECHO ADULT FOLLOW-UP OR LIMITED 03/24/2021 3/24/2021    Narrative · Limited intra-operative echo to help guide TAVR procedure  · Excellent position of THV with no significant aortic insufficiency  · No pericardial effusion  · Normal LV wall motion after TAVR        Signed by: Roger Cardenas MD       Assessment:  1. Symptomatic, severe bicuspid aortic stenosis s/p successful percutaneous left transfemoral TAVR using a 29 mm Giles 3 transcatheter valve with 3 mL extra volume  2. HTN  3. AF/AFL s/p recent ablation by Dr. Guillaume Pepe; maintaining sinus rhythm  4. Recent acute blood loss anemia due to GI bleeding; s/p polypectomy and biopsy of rectosigmoid mass (benign on biopsy); plans for laparoscopic colectomy; discussed timing with Dr. Marcia Hare  5. NALFD  6. New mild first degree AVB; NM interval 176 ms pre-TAVR, 212 ms on POD #0, now 204 ms; suspect this may continue to improve and FDAB will likely resolve once off of amiodarone    Plan:  - PT/OT, incentive spirometry  - Continue aspirin 81 mg daily indefinitely  - Continue losartan 25 mg daily (stop outpatient lisinopril)  - Continue rosuvastatin, pantoprazole  - OK to restart metformin on Saturday  - SBE prophylaxis prior to dental procedures  - Repeat TTE at 1 month  - F/u with me in 1 month  - F/u as previously planned with Dr. Guillaume Pepe  - Continue amiodarone until after laparoscopic colectomy is done  - F/u long-term with Dr. Guillaume Pepe (primary cardiologist)  - Follow up with cardiothoracic surgery team in 1 week after discharge  - Discussed with CTS, Dr. Guillaume Pepe and Dr. Arielle Norris for discharge to home today. Thank you for the opportunity to participate in the care of Rubens Sharma and please do not hesitate to contact us should you have any questions. Signed:  John Arellano.  Dennis Patrick, [address] [place] [address] / [address] [place] [address] [facility]  03/25/21

## 2021-03-25 NOTE — PROGRESS NOTES
Physician Progress Note      Kenna Melendez  CSN #:                  [id number]  :                       1945  ADMIT DATE:       3/24/2021 8:29 AM  DISCH DATE:  RESPONDING  PROVIDER #:        Sade Hawk NP          QUERY TEXT:    Dear Attending,    Pt admitted with severe aortic stenosis s/p TAVR and has a GIB with anemia documented in the H&P. Prior to this admission, the pt was previously admitted from 3/14/21 through 3/18/21 due to GIB with acute blood loss anemia and was s/p EGD with no acute bleeding noted during the EGD. Pt also was found to have a rectal mass identified as a tubular adenoma during the same EGD. Pt noted to have Hgb of 9.2 on 3/22/21 prior to admission but had a Hgb as low as 4.2 on the previous admission. Pt was noted to be on chronic Xarelto for atrial fibrillation prior to either admission. During the previous admission, pt received iron and 4 units of PRBCs. If possible, please document in progress notes and discharge summary further specificity regarding the acuity and type of anemia:    The medical record reflects the following:  Risk Factors: GIB, rectal mass, Xarelto use PTA, atrial fibrillation  Clinical Indicators:  - H&P - GI Bleed/Anemia: Underwent EGD-no acute bleeding noted, and Colonoscopy +  Rectal mass. On PPI, oral iron. Received 4 units PRBCs while inpt. - Rectal mass found on Colonoscopy: Surgery consulted -- paths all report tubular adenoma.   Needs outpt surgical FU for resection plans.  - Hgb = 9.2 on 3/22 (PTA); 8.1 and 7.4 on 3/18, 8.2 and 7.6 on 3/17, 6.0 on 3/15 and 4.2 on 3/14  - Occult blood stool = positive on 3/14  Treatment: Order noting that anticoagulant therapy is contraindicated, serial monitoring of hematology labs, close monitoring, recent prior admission for GIB    Thank-you,  Carlos Eduardo Burton RN, [facility]  Clinical   [phone]  Options provided:  -- Anemia due to acute blood loss POA from previous GIB  -- Other - I will add my own diagnosis  -- Disagree - Not applicable / Not valid  -- Disagree - Clinically unable to determine / Unknown  -- Refer to Clinical Documentation Reviewer    PROVIDER RESPONSE TEXT:    This patient has acute blood loss anemia POA from previous GIB.     Query created by: Jaun Perez on 3/24/2021 2:58 PM      Electronically signed by:  Aurelio Zurita NP 3/25/2021 9:31 AM

## 2021-03-25 NOTE — PROGRESS NOTES
[facility] ICU Progress Note    Admit Date: 3/24/2021  POD:  1 Day Post-Op    Procedure:  Procedure(s):  TRANSCATHETER AORTIC VALVE REPLACEMENT LEFT TRANSFEMORAL 29 S3, TRANSTHORACIC ECHO  Transcatheter Aortic Valve Replacement        Subjective:   Pt seen with Dr. Randee Lopez. Tmax 99.5, room air. Up in the chair. Denies pain or other complaints. Objective:   Vitals:  Blood pressure (!) 158/82, pulse 84, temperature 99.5 °F (37.5 °C), resp. rate 22, height 6' (1.829 m), weight 175 lb 4.8 oz (79.5 kg), SpO2 (!) 71 %. Temp (24hrs), Av.1 °F (36.7 °C), Min:97.5 °F (36.4 °C), Max:99.5 °F (37.5 °C)    EKG/Rhythm:  Sinus rhythm with 1st degree HB (WV of 204 and QRS of 88)    Oxygen Therapy:  Oxygen Therapy  O2 Sat (%): (!) 71 % (21)  O2 Device: Room air (21 0909)    CXR:   CXR Results  (Last 48 hours)               21 0605  XR CHEST PORT Final result    Impression:  No evidence of acute cardiopulmonary process. Narrative:  INDICATION: Postop heart surgery. COMPARISON: 3/24/2021       FINDINGS: AP portable imaging of the chest performed at 5:19 AM demonstrates a   stable cardiomediastinal silhouette. The lungs appear clear bilaterally,   although the costophrenic angles are excluded bilaterally. No significant   osseous abnormalities are seen. 21 1414  XR CHEST PORT Final result    Impression:  1. The lungs are clear       Narrative:  EXAM: XR CHEST PORT       INDICATION: postop heart surgery, aortic valve replacement       COMPARISON: 3/14/2021       FINDINGS: A portable AP radiograph of the chest was obtained at 1409 hours. The   patient is on a cardiac monitor. The patient is status post transcatheter aortic   valve replacement. Heart size is normal. Lungs are clear. No pneumothorax.                    Admission Weight: Last Weight   Weight: 182 lb 1.6 oz (82.6 kg) Weight: 175 lb 4.8 oz (79.5 kg)     Intake / Output / Drain:  Current Shift: No intake/output data recorded. Last 24 hrs.:     Intake/Output Summary (Last 24 hours) at 3/25/2021 0918  Last data filed at 3/25/2021 0700  Gross per 24 hour   Intake 1196.67 ml   Output 875 ml   Net 321.67 ml       EXAM:  General:  No acute distress. Up in the chair   Lungs:   Clear to auscultation bilaterally. B Groin sites:  No erythema, drainage or swelling. Dressings removed   Heart:  Regular rate and rhythm, S1, S2 normal, no murmur, click, rub or gallop. Abdomen:   Soft, non-tender. Bowel sounds normal. No masses,  No organomegaly. Extremities:  No edema. PPP. Neurologic:  Gross motor and sensory apparatus intact. Labs:   Recent Labs     21  0252 21  1356 21  0946   WBC 4.8 5.9  --    HGB 8.8* 8.3*  --    HCT 28.7* 26.5*  --    PLT 97* 100*  --     138  --    K 4.2 4.4  --    BUN 16 16  --    CREA 0.81 0.81  --    GLU 80 77  --    GLUCPOC  --   --  105*   INR  --  1.1  --         Assessment:     Active Problems:    Severe aortic stenosis (3/22/2021)      Aortic stenosis (3/24/2021)         Plan/Recommendations/Medical Decision Makin. Aortic Stenosis severe and symptomatic with moderate AI: s/p TAVR 3/24/21 w/ Dr. Sherry Ortiz and Dr. Sharon Green. ASA only. EKG with new 1st degree HB but TX decreased this morning from immediate postoperative EKG. Echo this morning then plan for discharge.     2. Anemia due to acute blood loss POA from previous GI bleeding: Underwent EGD on previous admission-no acute bleeding noted, and Colonoscopy +  Rectal mass. On PPI, oral iron. Received 4 units PRBCs during previous admission and Xarelto stopped. Hgb has remained stable on this admission. Continue to monitor.      3. Rectal mass found on Colonoscopy: Surgery consulted -- paths all report tubular adenoma. Needs outpt surgical FU for resection plans.      4. Atrial fibrillation s/p Ablation in 2021: Had been on Xarelto but was discontinued due to GIB. On Amiodarone-restarted following TAVR.     5. HTN:  Started on Losartan per Cardiology.     6. HLD: On Statin     7. DM II: On Metformin PTA -will restart on DC.  Pt's wife reports a healing diabetic ulcer on R great toe.      8. Anesthesia history: Pt's wife reports during recent colonscopy w/ sedation, there was concern for ISABEL. She asked this please be shared with anesthesia staff-and they were informed. Dispo: PT/OT/Cardiac Rehab. Case Management for discharge planning. Home today following Echo completion.        Signed By: Richelle Pereira NP

## 2021-03-26 ENCOUNTER — TELEPHONE (OUTPATIENT)
Dept: CASE MANAGEMENT | Age: 76
End: 2021-03-26

## 2021-03-26 NOTE — TELEPHONE ENCOUNTER
Cardiac Surgery Discharge - Follow up call placed to [facility]. Reviewed plan of care after discharge and encouraged [facility] to verbalize. Encouraged continued use of the incentive spirometer, Mr Anupama Howard stated he does not have his incentive spirometer. Provided Mrs Anupama Howard with another incentive spirometer. Confirmed follow up appts and reinforced importance of wearing red reminder bracelet. [facility] is without questions or concerns.  Shabnam Ramirez RN

## 2021-03-26 NOTE — TELEPHONE ENCOUNTER
Clarifying if pt needs to keep taking protonix. Pt's GI bleed has been stable, and he is no longer being followed by GI. Suggested pt stop taking it and see how it goes. He stated understanding.

## 2021-03-29 ENCOUNTER — OFFICE VISIT (OUTPATIENT)
Dept: CARDIOLOGY CLINIC | Age: 76
End: 2021-03-29
Payer: COMMERCIAL

## 2021-03-29 DIAGNOSIS — I35.0 NONRHEUMATIC AORTIC VALVE STENOSIS: Primary | ICD-10-CM

## 2021-03-29 DIAGNOSIS — Z95.2 S/P TAVR (TRANSCATHETER AORTIC VALVE REPLACEMENT): ICD-10-CM

## 2021-03-29 PROCEDURE — 99441 PR PHYS/QHP TELEPHONE EVALUATION 5-10 MIN: CPT | Performed by: THORACIC SURGERY (CARDIOTHORACIC VASCULAR SURGERY)

## 2021-03-29 NOTE — PROGRESS NOTES
Patient: Jacobo Smith   Age: 70 y.o. Patient Care Team:  Obed Greenfield MD as PCP - General (Family Medicine)  Obed Greenfield MD as PCP - [facility]  Glenny Del Rosario MD (Cardiology)  Ankita Hearn MD (Cardiothoracic Surgery)    PCP: Obed Greenfield MD    Cardiologist: Dr. Ksenia Escalante    Diagnosis/Reason for Consultation: The primary encounter diagnosis was Nonrheumatic aortic valve stenosis. A diagnosis of S/P TAVR (transcatheter aortic valve replacement) was also pertinent to this visit. Problem List:   Patient Active Problem List   Diagnosis Code    GIB (gastrointestinal bleeding) K92.2    Severe aortic stenosis I35.0    Aortic stenosis I35.0    S/P TAVR (transcatheter aortic valve replacement) Z95.2         HPI: 70 y.o. male  S/p transcatheter aortic valve replacement with 29 mm Giles 3 with 3 ml extra volume by Sebastian Maya on 3/24/21. His procedure was uncomplicated he was discharged to Home on 3/25/21. He is available by phone for his initial postoperative appointment. He states that he has been doing well since going home. He denies fever, chills, fatigue, worsening shortness of breath, chest pain, palpitations, orthopnea, PND, LE edema, or blood in his stool. He has been getting around his home fine and he plans to go back to his job later this week. He states that his bilateral groin sites are still a little tender but have been healing well and do not show signs of infection. He states he had a good experience for his TAVR and he is feeling well. NYHA Classification: IB   Class I (Mild): No limitation of physical activity. Ordinary physical activity does not cause undue fatigue, palpitation, or dyspnea.     Angina Classification: 0   Class 0: No symptoms      Past Medical History:   Diagnosis Date    Aortic regurgitation     Aortic stenosis     Atrial fibrillation (HCC)     Colon polyps     Diabetes mellitus, type 2 (HCC)     GI bleed     HTN (hypertension)          Past Surgical History:   Procedure Laterality Date    COLONOSCOPY N/A 3/16/2021    COLONOSCOPY performed by Oneil Hill MD at [facility] ENDOSCOPY    HX AFIB ABLATION  2021      Social History     Tobacco Use    Smoking status: Former Smoker     Types: Cigarettes     Quit date:      Years since quittin.2   Substance Use Topics    Alcohol use: Not on file      No family history on file. Prior to Admission medications    Medication Sig Start Date End Date Taking? Authorizing Provider   aspirin 81 mg chewable tablet Take 1 Tab by mouth daily. 3/26/21  Yes She Avila NP   losartan (COZAAR) 25 mg tablet Take 1 Tab by mouth daily for 90 days. 3/26/21 6/24/21 Yes She Avila NP   Ferrous Fumarate 325 mg (106 mg iron) tab Take 1 Tab by mouth two (2) times a day for 30 days. 3/18/21 4/17/21 Yes Silvio Jackson MD   amiodarone (CORDARONE) 200 mg tablet Take 200 mg by mouth. Yes Provider, Historical   metFORMIN (GLUCOPHAGE) 500 mg tablet Take 500 mg by mouth daily (with breakfast). Yes Provider, Historical   multivitamin (ONE A DAY) tablet Take 1 Tab by mouth daily. Yes Provider, Historical   pantoprazole (PROTONIX) 40 mg tablet Take 40 mg by mouth daily. Yes Provider, Historical   rosuvastatin (CRESTOR) 10 mg tablet Take 1 Tab by mouth nightly. Indications: hardening of the arteries due to plaque buildup 3/9/21  Yes Wendy Rodriguez MD   senna-docusate (PERICOLACE) 8.6-50 mg per tablet Take 1 Tab by mouth daily as needed for Constipation. 3/25/21 3/29/21  She Avila NP       No Known Allergies    Current Medications:   Current Outpatient Medications   Medication Sig Dispense Refill    aspirin 81 mg chewable tablet Take 1 Tab by mouth daily.  losartan (COZAAR) 25 mg tablet Take 1 Tab by mouth daily for 90 days. 90 Tab 0    Ferrous Fumarate 325 mg (106 mg iron) tab Take 1 Tab by mouth two (2) times a day for 30 days.  60 Tab 0    amiodarone (CORDARONE) 200 mg tablet Take 200 mg by mouth.  metFORMIN (GLUCOPHAGE) 500 mg tablet Take 500 mg by mouth daily (with breakfast).  multivitamin (ONE A DAY) tablet Take 1 Tab by mouth daily.  pantoprazole (PROTONIX) 40 mg tablet Take 40 mg by mouth daily.  rosuvastatin (CRESTOR) 10 mg tablet Take 1 Tab by mouth nightly. Indications: hardening of the arteries due to plaque buildup 90 Tab 4       Vitals: There were no vitals taken for this visit. Allergies: has No Known Allergies.     Review of Systems: Pertinent Positives per HPI   [x]Total of 13 systems reviewed as follows:  Constitutional: Negative fever, negative chills  Eyes:   Negative for amauroses fugax, +glasses  ENT:   Negative sore throat,oral abscess   Endocrine Negative for thyroid replacement Rx; goiter; +DM  Respiratory:  Negative chronic cough,sputum production  Cards:   Negative for palpitations, varicosities, claudication, lower extremity edema  GI:   Negative for dysphagia, bleeding, nausea, vomiting, diarrhea, and abdominal pain  Genitourinary: Negative for frequency, dysuria  Integument:  Negative for rash and pruritus  Hematologic:  Negative for easy bruising; bleeding dyscrasia   Musculoskel: Negative for muscle weakness inhibiting ambulation  Neurological:  Negative for stroke, TIA, syncope, dizziness  Behavl/Psych: Negative for feelings of anxiety, depression     Cardiovascular Testing:     EKG: 3/25/21  3/25/2021  5:27 PM - Benigno, Card Result In    Component Value Ref Range & Units Status   Ventricular Rate 73  BPM Final   Atrial Rate 73  BPM Final   P-R Interval 204  ms Final   QRS Duration 88  ms Final   Q-T Interval 434  ms Final   QTC Calculation (Bezet) 478  ms Final   Calculated P Axis 55  degrees Final   Calculated R Axis 50  degrees Final   Calculated T Axis 81  degrees Final   Diagnosis   Final   Normal sinus rhythm   Voltage criteria for left ventricular hypertrophy          TTE:  3/25/21  Interpretation Summary       · LV: Estimated LVEF is 65 - 70%. Borderline hyperdynamic LV systolic function. Normal cavity size and systolic function (ejection fraction normal). Mild concentric hypertrophy. Mild (grade 1) left ventricular diastolic dysfunction. · IAS: There was no shunting at baseline. · AV: Prosthetic aortic valve. Aortic valve mean gradient is 8 mmHg. EOA 3.09 cm2. There is a 29 mm Giles (+3 mL) prosthetic aortic valve from prior TAVR procedure. Prosthesis is normal. Trace paravalvular leak. · TV: Mild tricuspid valve regurgitation is present. Right Ventricular Arterial Pressure (RVSP) is 30 mmHg. Pulmonary hypertension not suggested by Doppler findings. TAVR Measurements  LVOTd 2.3 cm  LVOT VTI 26.3 cm  AV VTI 35.4 cm  DI 0.74  EOA 3.09 cm2  EOAi 1.53 cm2/m2  BSA 2.01 m2         Physical Exam:  No physical exam performed due to telephone encounter. Assessment/Plan:     1. Aortic Stenosis severe and symptomatic with moderate AI: s/p TAVR 3/24/21 w/ Dr. Bossman Shine and Dr. Geovany Villarreal. ASA only. Will have 1 month Echo on 4/21 at 2:45pm at S offices-reviewed with the patient. We will see him back on 4/22 at 11 am.     2. Anemia due to acute blood loss POA from previous GI bleeding: Underwent EGD on previous admission-no acute bleeding noted, and Colonoscopy +  Rectal mass. On PPI, oral iron. Hgb remained stable while hospitalized. Follow up with GI as scheduled.     3. Rectal mass found on Colonoscopy: Surgery consulted -- paths all report tubular adenoma.  Needs outpt surgical FU for resection plans.      4. Atrial fibrillation s/p Ablation in Feb 2021: Had been on Xarelto but was discontinued due to GIB. On Amiodarone-restarted following TAVR.     5. HTN:  Started on Losartan per Cardiology.     6. HLD: On Statin     7. DM II: On Metformin.  Pt's wife reports a healing diabetic ulcer on R great toe.        SBE prophylax reviewed. Time spent:  10 minutes spent on the telephone with the patient.   This time is not inclusive on time spent on note preparation, diagnostic testing review, or coordination of care. This service was provided through telehealth:  location of patient(Home)  and location of provider([facility]).

## 2021-03-30 ENCOUNTER — TELEPHONE (OUTPATIENT)
Dept: CARDIOLOGY CLINIC | Age: 76
End: 2021-03-30

## 2021-03-30 NOTE — TELEPHONE ENCOUNTER
Pt and wife concerned over bruising at groin site and also has a bruise under L nipple. Wife anxious due to his prev GI bleed and wants him seen to be sure we do not feel he is having another bleeding episode. Will discuss w/ Jaclyn Almanza NP setting up appt.

## 2021-03-31 ENCOUNTER — TELEPHONE (OUTPATIENT)
Dept: CARDIOLOGY CLINIC | Age: 76
End: 2021-03-31

## 2021-03-31 NOTE — TELEPHONE ENCOUNTER
The patient's wife called yesterday with concerns. I called the patient back today. He states he is doing well and back to work. I have offered to see him in the clinic and/or to order labs to check his H&H. The patient wishes to do neither of these at the moment and will call back if he changes his mind.

## 2021-04-14 ENCOUNTER — TELEPHONE (OUTPATIENT)
Dept: CARDIAC REHAB | Age: 76
End: 2021-04-14

## 2021-04-14 NOTE — TELEPHONE ENCOUNTER
4/14/2021 Cardiac Rehab: Called  Tisha Jayme to discuss participation in the Cardiac Rehab Program following TAVR on 3/24/2021. Left voicemail message.  Crystal Hauser

## 2021-04-22 ENCOUNTER — OFFICE VISIT (OUTPATIENT)
Dept: CARDIOLOGY CLINIC | Age: 76
End: 2021-04-22
Payer: COMMERCIAL

## 2021-04-22 VITALS
DIASTOLIC BLOOD PRESSURE: 80 MMHG | HEART RATE: 80 BPM | OXYGEN SATURATION: 98 % | RESPIRATION RATE: 16 BRPM | SYSTOLIC BLOOD PRESSURE: 162 MMHG

## 2021-04-22 DIAGNOSIS — Z95.2 S/P TAVR (TRANSCATHETER AORTIC VALVE REPLACEMENT): ICD-10-CM

## 2021-04-22 DIAGNOSIS — I35.0 SEVERE AORTIC STENOSIS: Primary | ICD-10-CM

## 2021-04-22 PROCEDURE — 99214 OFFICE O/P EST MOD 30 MIN: CPT | Performed by: THORACIC SURGERY (CARDIOTHORACIC VASCULAR SURGERY)

## 2021-04-22 NOTE — PROGRESS NOTES
Patient: Oziel Marin   Age: 70 y.o. Patient Care Team:  Agustin Lane MD as PCP - General (Family Medicine)  Agustin Lane MD as PCP - [facility]  Marylene Lease, MD (Cardiology)  Alexandria Leary MD (Cardiothoracic Surgery)    PCP: Agustin Lane MD    Cardiologist: Dr. Anahy Mims    Diagnosis/Reason for Consultation: The primary encounter diagnosis was Severe aortic stenosis. A diagnosis of S/P TAVR (transcatheter aortic valve replacement) was also pertinent to this visit. Problem List:   Patient Active Problem List   Diagnosis Code    GIB (gastrointestinal bleeding) K92.2    Severe aortic stenosis I35.0    Aortic stenosis I35.0    S/P TAVR (transcatheter aortic valve replacement) Z95.2         HPI: 70 y.o. male  S/p transcatheter aortic valve replacement with 29 mm Giles 3 with 3 ml extra volume by Sebastian Munoz and Ja on 2/39/25. GHS [id number] was uncomplicated he was discharged to Home on 3/25/21 he  is here today for their One Month Post Op Appointment. States he has been feeling well. No shortness of breath, fatigue, palpitations, dizziness, syncope, orthopnea, PND. LE edema, fever, chills. He tells me that he didn't realize how badly he felt until he had the TAVR and realized how much better he was. He has resumed all normal activities except going back to the gym and plans to do that now. He sees Dr. Anahy Mims next week and has an appointment with Colorectal surgery on 5/4/21. Last Dental Visit:  Last 6 months   Any dental pain/concerns: None    NYHA Classification: IB   Class I (Mild): No limitation of physical activity. Ordinary physical activity does not cause undue fatigue, palpitation, or dyspnea. Class II (Mild): Slight limitation of physical activity. Comfortable at rest, but ordinary physical activity results in fatigue, palpitation, or dyspnea. Class III (Moderate): Marked limitation of physical activity.  Comfortable at rest, but less than ordinary activity causes fatigue, palpitation, or dyspnea   Class IV (Severe): Unable to carry out any physical activity without discomfort. Symptoms  of cardiac insufficiency at rest.  If any physical activity is undertaken, discomfort is increased. Angina Classification: 0   Class 0: No symptoms   Class 1: Angina with strenuous exercise   Class 2: Angina with moderate exercise   Class 3: Angina with mild exertion   Walking 1-2 level blocks at normal pace; Climbing 1 flight of stairs at normal pace  Class 4: Angina at any level of physical exertion       Past Medical History:   Diagnosis Date    Aortic regurgitation     Aortic stenosis     Atrial fibrillation (HCC)     Colon polyps     Diabetes mellitus, type 2 (HCC)     GI bleed     HTN (hypertension)          Past Surgical History:   Procedure Laterality Date    COLONOSCOPY N/A 3/16/2021    COLONOSCOPY performed by Clovis Velázquez MD at [facility] ENDOSCOPY    HX AFIB ABLATION  2021      Social History     Tobacco Use    Smoking status: Former Smoker     Types: Cigarettes     Quit date:      Years since quittin.3   Substance Use Topics    Alcohol use: Not on file      No family history on file. Prior to Admission medications    Medication Sig Start Date End Date Taking? Authorizing Provider   aspirin 81 mg chewable tablet Take 1 Tab by mouth daily. 3/26/21  Yes Kenji Gr NP   losartan (COZAAR) 25 mg tablet Take 1 Tab by mouth daily for 90 days. 3/26/21 6/24/21 Yes Kenji Gr NP   metFORMIN (GLUCOPHAGE) 500 mg tablet Take 500 mg by mouth daily (with breakfast). Yes Provider, Historical   multivitamin (ONE A DAY) tablet Take 1 Tab by mouth daily. Yes Provider, Historical   rosuvastatin (CRESTOR) 10 mg tablet Take 1 Tab by mouth nightly. Indications: hardening of the arteries due to plaque buildup 3/9/21  Yes Gean Osler, MD   amiodarone (CORDARONE) 200 mg tablet Take 200 mg by mouth.     Provider, Historical   pantoprazole (PROTONIX) 40 mg tablet Take 40 mg by mouth daily. Provider, Historical       No Known Allergies    Current Medications:   Current Outpatient Medications   Medication Sig Dispense Refill    aspirin 81 mg chewable tablet Take 1 Tab by mouth daily.  losartan (COZAAR) 25 mg tablet Take 1 Tab by mouth daily for 90 days. 90 Tab 0    metFORMIN (GLUCOPHAGE) 500 mg tablet Take 500 mg by mouth daily (with breakfast).  multivitamin (ONE A DAY) tablet Take 1 Tab by mouth daily.  rosuvastatin (CRESTOR) 10 mg tablet Take 1 Tab by mouth nightly. Indications: hardening of the arteries due to plaque buildup 90 Tab 4    amiodarone (CORDARONE) 200 mg tablet Take 200 mg by mouth.  pantoprazole (PROTONIX) 40 mg tablet Take 40 mg by mouth daily. Vitals: Blood pressure (!) 162/80, pulse 80, resp. rate 16, SpO2 98 %. Allergies: has No Known Allergies.     Review of Systems: Pertinent Positives per HPI   [x]Total of 13 systems reviewed as follows:  Constitutional: Negative fever, negative chills  Eyes:   Negative for amauroses fugax  ENT:   Negative sore throat,oral abscess   Endocrine Negative for thyroid replacement Rx; goiter; DM  Respiratory:  Negative chronic cough,sputum production  Cards:   Negative for palpitations, varicosities, claudication, lower extremity edema  GI:   Negative for dysphagia, bleeding, nausea, vomiting, diarrhea, and abdominal pain  Genitourinary: Negative for frequency, dysuria  Integument:  Negative for rash and pruritus  Hematologic:  Negative for easy bruising; bleeding dyscrasia   Musculoskel: Negative for muscle weakness inhibiting ambulation  Neurological:  Negative for stroke, TIA, syncope, dizziness  Behavl/Psych: Negative for feelings of anxiety, depression     Cardiovascular Testing:     EKG: 3/25/21  3/25/2021  5:27 PM - Benigno, Card Result In     Component Value Ref Range & Units Status   Ventricular Rate 73  BPM Final   Atrial Rate 73  BPM Final   P-R Interval 204  ms Final   QRS Duration 88  ms Final   Q-T Interval 434  ms Final   QTC Calculation (Bezet) 478  ms Final   Calculated P Axis 55  degrees Final   Calculated R Axis 50  degrees Final   Calculated T Axis 81  degrees Final   Diagnosis     Final   Normal sinus rhythm   Voltage criteria for left ventricular hypertrophy             TTE:  3/25/21  Interpretation Summary        · LV: Estimated LVEF is 65 - 70%. Borderline hyperdynamic LV systolic function. Normal cavity size and systolic function (ejection fraction normal). Mild concentric hypertrophy. Mild (grade 1) left ventricular diastolic dysfunction. · IAS: There was no shunting at baseline. · AV: Prosthetic aortic valve. Aortic valve mean gradient is 8 mmHg. EOA 3.09 cm2. There is a 29 mm Giles (+3 mL) prosthetic aortic valve from prior TAVR procedure. Prosthesis is normal. Trace paravalvular leak. · TV: Mild tricuspid valve regurgitation is present. Right Ventricular Arterial Pressure (RVSP) is 30 mmHg. Pulmonary hypertension not suggested by Doppler findings.     TAVR Measurements  LVOTd 2.3 cm  LVOT VTI 26.3 cm  AV VTI 35.4 cm  DI 0.74  EOA 3.09 cm2  EOAi 1.53 cm2/m2  BSA 2.01 m2     TTE done 4/21/21 at [facility] and scanned in:  Summary:  1. Moderately increased LV wall thickness with normal LV kerri size and normal LV systolic function (EF 02-91%). Impaired relaxation. Normal RV size and systolic function. 2. The 29 mm Giles 3 THV (+3 cc volume) is stable with physiologic gradients. EOA 2.36 cm2, EOAi 1.15 cm2/m2, DI 0.60, LVOT/AV VTI 24.1/40.5 cm, LVOTd 2.25 cm, BSA 2.05 m2. Trace paravalvular aortic regurgitation. Mild mitral regurgitation. Mild tricuspid regurgitation with normal estimated RVSP. 3. Mild LAE. Normal IVC. Physical Exam:  General: Well nourished well groomed male appearing stated age   Neuro: A&OX3. ACEVEDO. PERRL. Steady unassisted gait  Head:Normocephalic. Atraumatic. Symmetrical  Neck: Trachea Midline  Resp: CTA B.  No Adv BS/cough/sputum/tachypnea with seated conversation  CV: S1S2 RRR. No appreciable murmur. No JVD/carotid bruits. Pink/warm/dry extremities. No LE peripheral edema  GI:Benign ab. Soft. NT/ND. Active BS  : Voids  Integ: No obvious s/s of infection or breakdown  Musculo/Skeletal: FROM in all major joints. Good muscle tone    Clinic Evaluation:     KCCQ-12: scanned into EMR    Assessment/Plan:     1. Aortic Stenosis severe and symptomatic with moderate AI: s/p TAVR 3/24/21 w/ Dr. [place] Person and Dr. Rohit Estrada. ASA only.      2. Anemia due to acute blood loss POA from previous GI bleeding: Underwent EGD on previous admission-no acute bleeding noted, and Colonoscopy +  Rectal mass. On PPI, oral iron. Hgb remained stable while hospitalized. Follow up with GI as scheduled.     3. Rectal mass found on Colonoscopy: Surgery consulted -- paths all report tubular adenoma.  Needs outpt surgical FU for resection plans.      4. Atrial fibrillation s/p Ablation in Feb 2021: Had been on Xarelto but was discontinued due to GIB. On Amiodarone-restarted following TAVR.     5. HTN:  Started on Losartan per Cardiology.     6. HLD: On Statin     7. DM II: On Metformin.  Pt's wife reports a healing diabetic ulcer on R great toe. SBE prophylax reviewed.      F/U with primary cardiologist      Doing well post TAVR  Symx much improved  Echo report looks good   Plan follow up

## 2021-05-09 ENCOUNTER — APPOINTMENT (OUTPATIENT)
Dept: CT IMAGING | Age: 76
End: 2021-05-09
Attending: EMERGENCY MEDICINE
Payer: COMMERCIAL

## 2021-05-09 ENCOUNTER — HOSPITAL ENCOUNTER (EMERGENCY)
Age: 76
Discharge: HOME | End: 2021-05-09
Attending: EMERGENCY MEDICINE
Payer: COMMERCIAL

## 2021-05-09 VITALS
RESPIRATION RATE: 16 BRPM | DIASTOLIC BLOOD PRESSURE: 79 MMHG | OXYGEN SATURATION: 95 % | HEIGHT: 72 IN | BODY MASS INDEX: 23.73 KG/M2 | HEART RATE: 81 BPM | SYSTOLIC BLOOD PRESSURE: 148 MMHG | TEMPERATURE: 98.3 F

## 2021-05-09 DIAGNOSIS — S09.90XA INJURY OF HEAD, INITIAL ENCOUNTER: Primary | ICD-10-CM

## 2021-05-09 PROCEDURE — 75810000293 HC SIMP/SUPERF WND  RPR

## 2021-05-09 PROCEDURE — 99284 EMERGENCY DEPT VISIT MOD MDM: CPT

## 2021-05-09 PROCEDURE — 74011000250 HC RX REV CODE- 250: Performed by: EMERGENCY MEDICINE

## 2021-05-09 PROCEDURE — 70486 CT MAXILLOFACIAL W/O DYE: CPT

## 2021-05-09 PROCEDURE — 70450 CT HEAD/BRAIN W/O DYE: CPT

## 2021-05-09 PROCEDURE — 74011250637 HC RX REV CODE- 250/637: Performed by: EMERGENCY MEDICINE

## 2021-05-09 RX ORDER — ACETAMINOPHEN 500 MG
1000 TABLET ORAL
Status: COMPLETED | OUTPATIENT
Start: 2021-05-09 | End: 2021-05-09

## 2021-05-09 RX ORDER — BACITRACIN 500 UNIT/G
1 PACKET (EA) TOPICAL
Status: COMPLETED | OUTPATIENT
Start: 2021-05-09 | End: 2021-05-09

## 2021-05-09 RX ADMIN — BACITRACIN 1 PACKET: 500 OINTMENT TOPICAL at 20:08

## 2021-05-09 RX ADMIN — ACETAMINOPHEN 1000 MG: 500 TABLET ORAL at 20:08

## 2021-05-09 NOTE — ED TRIAGE NOTES
Patient arrived via EMS from home following a ground level fall. Patient reports he was walking his dog, and it tripped him. Patient has abrasions to the face and knee. Patient reports laceration to the scalp.   Scalp is dressed upon arrival.

## 2021-05-09 NOTE — ED PROVIDER NOTES
History of hypertension, diabetes, GI bleeding, A. fib, aortic regurgitation/stenosis. He presents via EMS accompanied by his wife with complaints of a ground-level fall and head injury. He states that he was walking his dog this evening. He states that his dog saw a cat and \"wrapped around my leg. \"  This caused him to fall. He attempted to stick out his arms to brace himself but was unsuccessful. His right periorbital area struck the pavement. He denies loss of consciousness, significant headache, or amnesia. He has some right periorbital swelling/abrasions/pain. He takes aspirin but no other blood thinners. He denies neck pain, back pain, chest pain, abdominal pain, or significant extremity pain. He has abrasions over his right knee. Tetanus is up-to-date. Past Medical History:   Diagnosis Date    Aortic regurgitation     Aortic stenosis     Atrial fibrillation (HCC)     Colon polyps     Diabetes mellitus, type 2 (HCC)     GI bleed     HTN (hypertension)        Past Surgical History:   Procedure Laterality Date    COLONOSCOPY N/A 3/16/2021    [facility] Nasreen Major [facility] ENDOSCOPY    HX AFIB ABLATION  2021         History reviewed. No pertinent family history.     Social History     Socioeconomic History    Marital status:      Spouse name: Not on file    Number of children: Not on file    Years of education: Not on file    Highest education level: Not on file   Occupational History    Not on file   Social Needs    Financial resource strain: Not on file    Food insecurity     Worry: Not on file     Inability: Not on file    Transportation needs     Medical: Not on file     Non-medical: Not on file   Tobacco Use    Smoking status: Former Smoker     Types: Cigarettes     Quit date: 2000     Years since quittin.3    Smokeless tobacco: Never Used   Substance and Sexual Activity    Alcohol use: Not on file    Drug use: Not on file   [facility] Sexual activity: Not on file   Lifestyle    Physical activity     Days per week: Not on file     Minutes per session: Not on file    Stress: Not on file   Relationships    Social connections     Talks on phone: Not on file     Gets together: Not on file     Attends [employer]ism service: Not on file     Active member of club or organization: Not on file     Attends meetings of clubs or organizations: Not on file     Relationship status: Not on file    Intimate partner violence     Fear of current or ex partner: Not on file     Emotionally abused: Not on file     Physically abused: Not on file     Forced sexual activity: Not on file   Other Topics Concern    Not on file   Social History Narrative    Not on file         ALLERGIES: Patient has no known allergies. Review of Systems   All other systems reviewed and are negative. Vitals:    05/09/21 1933   BP: (!) 152/75   Pulse: 87   Resp: 15   Temp: 98.3 °F (36.8 °C)   SpO2: 96%   Height: 6' (1.829 m)            Physical Exam  Vitals signs and nursing note reviewed. Constitutional:       Appearance: He is well-developed. HENT:      Head: Normocephalic. Comments: Significant right periorbital soft tissue swelling with multiple abrasions. There is one 0.25 cm laceration over his right forehead with an abrasion. It does not appear deep. Eyes:      Extraocular Movements: Extraocular movements intact. Conjunctiva/sclera: Conjunctivae normal.      Pupils: Pupils are equal, round, and reactive to light. Neck:      Musculoskeletal: Neck supple. Trachea: No tracheal deviation. Comments: No tenderness. Cardiovascular:      Rate and Rhythm: Normal rate and regular rhythm. Heart sounds: Normal heart sounds. No murmur. No friction rub. No gallop. Pulmonary:      Effort: Pulmonary effort is normal.      Breath sounds: Normal breath sounds. Abdominal:      Palpations: Abdomen is soft. Tenderness: There is no abdominal tenderness. Musculoskeletal:         General: No deformity. Comments: Right knee abrasions without significant bony tenderness. Skin:     General: Skin is warm and dry. Neurological:      Mental Status: He is alert. Comments: oriented          MDM       Wound Closure by Adhesive    Date/Time: 5/9/2021 8:30 PM  Performed by: Basilio Boston MD  Authorized by: Basilio Boston MD     Consent:     Consent obtained:  Verbal    Consent given by:  Patient  Anesthesia (see MAR for exact dosages): Anesthesia method:  None  Laceration details:     Location:  Face    Face location:  Forehead    Wound length (cm): 0.5. Repair type:     Repair type:  Simple  Exploration:     Hemostasis achieved with:  Direct pressure    Contaminated: no    Treatment:     Visualized foreign bodies/material removed: no    Skin repair:     Repair method:  Tissue adhesive  Approximation:     Approximation:  Close  Post-procedure details:     Dressing:  Open (no dressing)    Patient tolerance of procedure: Tolerated well, no immediate complications        Progress Note:  Results, treatment, and follow up plan have been discussed with patient/wife. Questions were answered. Sayra Davis MD    Assessment/plan: Rona Jamie fall with facial trauma -differential diagnosis includes subdural hematoma, subarachnoid hemorrhage, intraparenchymal bleed, soft tissue injuries, facial bone fractures, and others. Reassuring appearance/exam with stable vital signs. CT head and CT maxillofacial bones negative for acute process other than soft tissue swelling. Wound was cleansed. Home with PCP follow-up as needed. Return precautions discussed. Wound care instructions discussed.   Sayra Davis MD

## 2021-05-10 NOTE — ED NOTES
Wounds to face, right hand, and right knee cleaned. Bacitracin applied and covered with a nonstick dressing. Patient and wife verbalize understanding of wound care. Denies any questions or concerns at this time.

## 2021-05-13 ENCOUNTER — TELEPHONE (OUTPATIENT)
Dept: CARDIAC REHAB | Age: 76
End: 2021-05-13

## 2021-05-13 NOTE — TELEPHONE ENCOUNTER
5/13/2021 Cardiac Rehab: Second call to Mr. Salina Bucio to discuss participation in the Cardiac Rehab Program. Left voicemail message asking for a returned phone call to schedule an intake appointment or to take off the call list. Margaret Jamison     4/14/2021 Cardiac Rehab: Called Mr. Salina Bucio to discuss participation in the Cardiac Rehab Program following TAVR on 3/24/2021. Left voicemail message.  Margaret Jamison

## 2021-06-01 ENCOUNTER — HOSPITAL ENCOUNTER (OUTPATIENT)
Dept: PREADMISSION TESTING | Age: 76
Discharge: HOME | End: 2021-06-01
Payer: COMMERCIAL

## 2021-06-01 ENCOUNTER — TRANSCRIBE ORDER (OUTPATIENT)
Dept: REGISTRATION | Age: 76
End: 2021-06-01

## 2021-06-01 DIAGNOSIS — Z01.812 PRE-PROCEDURE LAB EXAM: Primary | ICD-10-CM

## 2021-06-01 DIAGNOSIS — Z01.812 PRE-PROCEDURE LAB EXAM: ICD-10-CM

## 2021-06-01 PROCEDURE — U0003 INFECTIOUS AGENT DETECTION BY NUCLEIC ACID (DNA OR RNA); SEVERE ACUTE RESPIRATORY SYNDROME CORONAVIRUS 2 (SARS-COV-2) (CORONAVIRUS DISEASE [COVID-19]), AMPLIFIED PROBE TECHNIQUE, MAKING USE OF HIGH THROUGHPUT TECHNOLOGIES AS DESCRIBED BY CMS-2020-01-R: HCPCS

## 2021-06-02 LAB — SARS-COV-2, COV2NT: NOT DETECTED

## 2021-09-25 ENCOUNTER — APPOINTMENT (OUTPATIENT)
Dept: GENERAL RADIOLOGY | Age: 76
End: 2021-09-25
Attending: PHYSICIAN ASSISTANT
Payer: COMMERCIAL

## 2021-09-25 ENCOUNTER — APPOINTMENT (OUTPATIENT)
Dept: CT IMAGING | Age: 76
End: 2021-09-25
Attending: PHYSICIAN ASSISTANT
Payer: COMMERCIAL

## 2021-09-25 ENCOUNTER — HOSPITAL ENCOUNTER (EMERGENCY)
Age: 76
Discharge: ANOTHER HOSPITAL | End: 2021-09-26
Attending: EMERGENCY MEDICINE
Payer: COMMERCIAL

## 2021-09-25 ENCOUNTER — APPOINTMENT (OUTPATIENT)
Dept: MRI IMAGING | Age: 76
End: 2021-09-25
Attending: PHYSICIAN ASSISTANT
Payer: COMMERCIAL

## 2021-09-25 DIAGNOSIS — E11.621 DIABETIC ULCER OF TOE OF RIGHT FOOT ASSOCIATED WITH TYPE 2 DIABETES MELLITUS, UNSPECIFIED ULCER STAGE (HCC): Primary | ICD-10-CM

## 2021-09-25 DIAGNOSIS — L97.519 DIABETIC ULCER OF TOE OF RIGHT FOOT ASSOCIATED WITH TYPE 2 DIABETES MELLITUS, UNSPECIFIED ULCER STAGE (HCC): Primary | ICD-10-CM

## 2021-09-25 LAB
ANION GAP SERPL CALC-SCNC: 6 MMOL/L (ref 5–15)
BASOPHILS # BLD: 0 K/UL (ref 0–0.1)
BASOPHILS NFR BLD: 0 % (ref 0–1)
BUN SERPL-MCNC: 15 MG/DL (ref 6–20)
BUN/CREAT SERPL: 16 (ref 12–20)
CALCIUM SERPL-MCNC: 9.4 MG/DL (ref 8.5–10.1)
CHLORIDE SERPL-SCNC: 103 MMOL/L (ref 97–108)
CO2 SERPL-SCNC: 24 MMOL/L (ref 21–32)
CREAT SERPL-MCNC: 0.93 MG/DL (ref 0.7–1.3)
DIFFERENTIAL METHOD BLD: ABNORMAL
EOSINOPHIL # BLD: 0.1 K/UL (ref 0–0.4)
EOSINOPHIL NFR BLD: 1 % (ref 0–7)
ERYTHROCYTE [DISTWIDTH] IN BLOOD BY AUTOMATED COUNT: 14.2 % (ref 11.5–14.5)
GLUCOSE SERPL-MCNC: 123 MG/DL (ref 65–100)
HCT VFR BLD AUTO: 31.5 % (ref 36.6–50.3)
HGB BLD-MCNC: 10.3 G/DL (ref 12.1–17)
IMM GRANULOCYTES # BLD AUTO: 0 K/UL (ref 0–0.04)
IMM GRANULOCYTES NFR BLD AUTO: 0 % (ref 0–0.5)
LACTATE SERPL-SCNC: 1 MMOL/L (ref 0.4–2)
LYMPHOCYTES # BLD: 0.7 K/UL (ref 0.8–3.5)
LYMPHOCYTES NFR BLD: 12 % (ref 12–49)
MCH RBC QN AUTO: 28.6 PG (ref 26–34)
MCHC RBC AUTO-ENTMCNC: 32.7 G/DL (ref 30–36.5)
MCV RBC AUTO: 87.5 FL (ref 80–99)
MONOCYTES # BLD: 0.7 K/UL (ref 0–1)
MONOCYTES NFR BLD: 13 % (ref 5–13)
NEUTS SEG # BLD: 4.2 K/UL (ref 1.8–8)
NEUTS SEG NFR BLD: 74 % (ref 32–75)
NRBC # BLD: 0 K/UL (ref 0–0.01)
NRBC BLD-RTO: 0 PER 100 WBC
PLATELET # BLD AUTO: 111 K/UL (ref 150–400)
PLATELET COMMENTS,PCOM: ABNORMAL
PMV BLD AUTO: 9.5 FL (ref 8.9–12.9)
POTASSIUM SERPL-SCNC: 3.6 MMOL/L (ref 3.5–5.1)
RBC # BLD AUTO: 3.6 M/UL (ref 4.1–5.7)
RBC MORPH BLD: ABNORMAL
SODIUM SERPL-SCNC: 133 MMOL/L (ref 136–145)
WBC # BLD AUTO: 5.7 K/UL (ref 4.1–11.1)

## 2021-09-25 PROCEDURE — 83605 ASSAY OF LACTIC ACID: CPT

## 2021-09-25 PROCEDURE — 74011000258 HC RX REV CODE- 258: Performed by: EMERGENCY MEDICINE

## 2021-09-25 PROCEDURE — 99285 EMERGENCY DEPT VISIT HI MDM: CPT

## 2021-09-25 PROCEDURE — 36415 COLL VENOUS BLD VENIPUNCTURE: CPT

## 2021-09-25 PROCEDURE — 96365 THER/PROPH/DIAG IV INF INIT: CPT

## 2021-09-25 PROCEDURE — 80048 BASIC METABOLIC PNL TOTAL CA: CPT

## 2021-09-25 PROCEDURE — 73718 MRI LOWER EXTREMITY W/O DYE: CPT

## 2021-09-25 PROCEDURE — 87040 BLOOD CULTURE FOR BACTERIA: CPT

## 2021-09-25 PROCEDURE — 74011250636 HC RX REV CODE- 250/636: Performed by: EMERGENCY MEDICINE

## 2021-09-25 PROCEDURE — 96367 TX/PROPH/DG ADDL SEQ IV INF: CPT

## 2021-09-25 PROCEDURE — 74011250637 HC RX REV CODE- 250/637: Performed by: EMERGENCY MEDICINE

## 2021-09-25 PROCEDURE — 73630 X-RAY EXAM OF FOOT: CPT

## 2021-09-25 PROCEDURE — 96366 THER/PROPH/DIAG IV INF ADDON: CPT

## 2021-09-25 PROCEDURE — 85025 COMPLETE CBC W/AUTO DIFF WBC: CPT

## 2021-09-25 RX ORDER — CLONIDINE HYDROCHLORIDE 0.1 MG/1
0.2 TABLET ORAL
Status: COMPLETED | OUTPATIENT
Start: 2021-09-25 | End: 2021-09-25

## 2021-09-25 RX ORDER — VANCOMYCIN 2 GRAM/500 ML IN 0.9 % SODIUM CHLORIDE INTRAVENOUS
2000 ONCE
Status: COMPLETED | OUTPATIENT
Start: 2021-09-25 | End: 2021-09-26

## 2021-09-25 RX ORDER — SULFAMETHOXAZOLE AND TRIMETHOPRIM 800; 160 MG/1; MG/1
2 TABLET ORAL 2 TIMES DAILY
Qty: 28 TABLET | Refills: 0 | Status: SHIPPED | OUTPATIENT
Start: 2021-09-25 | End: 2021-10-01

## 2021-09-25 RX ADMIN — PIPERACILLIN AND TAZOBACTAM 3.38 G: 3; .375 INJECTION, POWDER, LYOPHILIZED, FOR SOLUTION INTRAVENOUS at 21:49

## 2021-09-25 RX ADMIN — VANCOMYCIN HYDROCHLORIDE 2000 MG: 10 INJECTION, POWDER, LYOPHILIZED, FOR SOLUTION INTRAVENOUS at 22:06

## 2021-09-25 RX ADMIN — CLONIDINE HYDROCHLORIDE 0.2 MG: 0.1 TABLET ORAL at 23:20

## 2021-09-25 NOTE — ED TRIAGE NOTES
Patient arrives ambulatory to the ED with chief complaint of worsening R foot infection and bleeding. Patient has been following a Podiatrist and got a skin graft 1 week ago. Patient followed-up on 9/23/21 and was diagnosed with an infection on skin graft site. Patient was prescribed amoxicillin.

## 2021-09-25 NOTE — ED PROVIDER NOTES
79 y/o male presenting with complaint of diabetic foot ulcer. The patient states that he has had an ulcer on his right great toe for the past year. He had a skin graft performed 9 days ago by his podiatrist (Dr. Eric Rai), and began to notice swelling of the toe a few days later. Over the past several days the swelling has spread to his foot and then his lower leg. He followed up with podiatry 2 days ago and was prescribed augmentin, which he has been taking as prescribed. He denies any pain to the area. No fevers, body aches, nausea or vomiting. The history is provided by the patient and the spouse. Past Medical History:   Diagnosis Date    Aortic regurgitation     Aortic stenosis     Atrial fibrillation (HCC)     Colon polyps     Diabetes mellitus, type 2 (HCC)     GI bleed     HTN (hypertension)        Past Surgical History:   Procedure Laterality Date    COLONOSCOPY N/A 3/16/2021    COLONOSCOPY performed by Majel Heimlich., MD at [facility] ENDOSCOPY    HX AFIB ABLATION  2021         No family history on file.     Social History     Socioeconomic History    Marital status:      Spouse name: Not on file    Number of children: Not on file    Years of education: Not on file    Highest education level: Not on file   Occupational History    Not on file   Tobacco Use    Smoking status: Former Smoker     Types: Cigarettes     Quit date: 2000     Years since quittin.7    Smokeless tobacco: Never Used   Substance and Sexual Activity    Alcohol use: Not on file    Drug use: Not on file    Sexual activity: Not on file   Other Topics Concern    Not on file   Social History Narrative    Not on file     Social Determinants of Health     Financial Resource Strain:     Difficulty of Paying Living Expenses:    Food Insecurity:     Worried About [address] in the Last Year:     [address] in the Last Year:    Transportation Needs:     Lack of Transportation (Medical):  Lack of Transportation (Non-Medical):    Physical Activity:     Days of Exercise per Week:     Minutes of Exercise per Session:    Stress:     Feeling of Stress :    Social Connections:     Frequency of Communication with Friends and Family:     Frequency of Social Gatherings with Friends and Family:     Attends Orthodox Services:     Active Member of Clubs or Organizations:     Attends Club or Organization Meetings:     Marital Status:    Intimate Partner Violence:     Fear of Current or Ex-Partner:     Emotionally Abused:     Physically Abused:     Sexually Abused: ALLERGIES: Patient has no known allergies. Review of Systems   Constitutional: Negative for chills and fever. HENT: Negative for congestion. Respiratory: Negative for cough. Gastrointestinal: Negative for nausea and vomiting. Genitourinary: Negative for dysuria, frequency and urgency. Musculoskeletal: Negative for arthralgias and myalgias. Skin: Positive for wound. Neurological: Positive for numbness (baseline neuropathy). All other systems reviewed and are negative. Vitals:    09/25/21 1418   BP: (!) 185/91   Pulse: (!) 103   Resp: 16   Temp: 97.6 °F (36.4 °C)   SpO2: 98%            Physical Exam  Vitals and nursing note reviewed. Constitutional:       General: He is not in acute distress. Appearance: He is well-developed. He is not diaphoretic. HENT:      Head: Normocephalic and atraumatic. Eyes:      Conjunctiva/sclera: Conjunctivae normal.   Cardiovascular:      Rate and Rhythm: Normal rate. Pulmonary:      Effort: Pulmonary effort is normal. No respiratory distress. Musculoskeletal:      Comments: Plantar surface of right great toe with ulcer approx 1.5 cm diameter, with swelling and erythema of great toe and medial aspect of foot, extending to ankle. Minimal tenderness to palpation. Skin:     General: Skin is warm and dry.    Neurological:      Mental Status: He is alert and oriented to person, place, and time. MDM       Procedures  Presentation, management, and disposition were discussed with the attending physician, Dr. Barb Soto, who has seen the patient at bedside and is in agreement with plan of care. 77 y/o male presenting with complaint of diabetic foot ulcer. The patient is well-appearing in no acute distress, does not appear toxic or septic. Labs reveal no leukocytosis, normal renal function and electrolytes, lactic acid 1.0. XR right foot, read by radiology and independently visualized and interpreted by myself, reveals no evidence of osteomyelitis. Will consult podiatry for recommendations. Consult Note - 4:44 PM  I have spoken with Dr. Robby Cunningham, discussed patient presentation, exam and diagnostic results. Plan is to obtain MRI, discharge with addition of Bactrim vs admission for debridement pending MRI results. MRI pending. Care of this patient transferred to Dr. Barb Soto at end of shift, please see his documentation for further ED course and disposition.

## 2021-09-25 NOTE — DISCHARGE INSTRUCTIONS
Thank you for allowing us to provide you with medical care today. We realize that you have many choices for your emergency care needs. We thank you for choosing [facility]. Please choose us in the future for any continued health care needs. We hope we addressed all of your medical concerns. We strive to provide excellent quality care in the Emergency Department. Anything less than excellent does not meet our expectations. The exam and treatment you received in the Emergency Department were for an emergent problem and are not intended as complete care. It is important that you follow up with a doctor, nurse practitioner, or physician's assistant for ongoing care. If your symptoms worsen or you do not improve as expected and you are unable to reach your usual health care provider, you should return to the Emergency Department. We are available 24 hours a day. Take this sheet with you when you go to your follow-up visit. If you have any problem arranging the follow-up visit, contact the Emergency Department immediately. Make an appointment your family doctor for follow up of this visit. Return to the ER if you are unable to be seen in a timely manner.

## 2021-09-26 ENCOUNTER — HOSPITAL ENCOUNTER (INPATIENT)
Age: 76
LOS: 5 days | Discharge: HOME HEALTH | DRG: 629 | End: 2021-10-01
Attending: STUDENT IN AN ORGANIZED HEALTH CARE EDUCATION/TRAINING PROGRAM | Admitting: INTERNAL MEDICINE
Payer: COMMERCIAL

## 2021-09-26 ENCOUNTER — ANESTHESIA EVENT (OUTPATIENT)
Dept: SURGERY | Age: 76
DRG: 629 | End: 2021-09-26
Payer: COMMERCIAL

## 2021-09-26 ENCOUNTER — ANESTHESIA (OUTPATIENT)
Dept: SURGERY | Age: 76
DRG: 629 | End: 2021-09-26
Payer: COMMERCIAL

## 2021-09-26 VITALS
OXYGEN SATURATION: 98 % | HEART RATE: 88 BPM | DIASTOLIC BLOOD PRESSURE: 86 MMHG | RESPIRATION RATE: 12 BRPM | TEMPERATURE: 98.7 F | SYSTOLIC BLOOD PRESSURE: 123 MMHG

## 2021-09-26 PROBLEM — E11.8 DIABETIC FOOT (HCC): Status: ACTIVE | Noted: 2021-09-26

## 2021-09-26 LAB
GLUCOSE BLD STRIP.AUTO-MCNC: 116 MG/DL (ref 65–117)
GLUCOSE BLD STRIP.AUTO-MCNC: 131 MG/DL (ref 65–117)
GLUCOSE BLD STRIP.AUTO-MCNC: 159 MG/DL (ref 65–117)
GLUCOSE BLD STRIP.AUTO-MCNC: 161 MG/DL (ref 65–117)
GLUCOSE BLD STRIP.AUTO-MCNC: 214 MG/DL (ref 65–117)
SERVICE CMNT-IMP: ABNORMAL
SERVICE CMNT-IMP: NORMAL

## 2021-09-26 PROCEDURE — 87176 TISSUE HOMOGENIZATION CULTR: CPT

## 2021-09-26 PROCEDURE — 74011000250 HC RX REV CODE- 250: Performed by: NURSE ANESTHETIST, CERTIFIED REGISTERED

## 2021-09-26 PROCEDURE — 88311 DECALCIFY TISSUE: CPT

## 2021-09-26 PROCEDURE — 74011000258 HC RX REV CODE- 258: Performed by: INTERNAL MEDICINE

## 2021-09-26 PROCEDURE — 87185 SC STD ENZYME DETCJ PER NZM: CPT

## 2021-09-26 PROCEDURE — 74011250636 HC RX REV CODE- 250/636: Performed by: NURSE ANESTHETIST, CERTIFIED REGISTERED

## 2021-09-26 PROCEDURE — 77030003445 HC NDL BIOP BN BD -B: Performed by: PODIATRIST

## 2021-09-26 PROCEDURE — 74011250636 HC RX REV CODE- 250/636: Performed by: INTERNAL MEDICINE

## 2021-09-26 PROCEDURE — 36415 COLL VENOUS BLD VENIPUNCTURE: CPT

## 2021-09-26 PROCEDURE — 74011250637 HC RX REV CODE- 250/637: Performed by: INTERNAL MEDICINE

## 2021-09-26 PROCEDURE — 82962 GLUCOSE BLOOD TEST: CPT

## 2021-09-26 PROCEDURE — 77030000032 HC CUF TRNQT ZIMM -B: Performed by: PODIATRIST

## 2021-09-26 PROCEDURE — 0QBQ0ZZ EXCISION OF RIGHT TOE PHALANX, OPEN APPROACH: ICD-10-PCS | Performed by: PODIATRIST

## 2021-09-26 PROCEDURE — 87077 CULTURE AEROBIC IDENTIFY: CPT

## 2021-09-26 PROCEDURE — 87205 SMEAR GRAM STAIN: CPT

## 2021-09-26 PROCEDURE — 76010000138 HC OR TIME 0.5 TO 1 HR: Performed by: PODIATRIST

## 2021-09-26 PROCEDURE — 88307 TISSUE EXAM BY PATHOLOGIST: CPT

## 2021-09-26 PROCEDURE — 74011000250 HC RX REV CODE- 250: Performed by: PODIATRIST

## 2021-09-26 PROCEDURE — 74011250637 HC RX REV CODE- 250/637: Performed by: PODIATRIST

## 2021-09-26 PROCEDURE — 77030002916 HC SUT ETHLN J&J -A: Performed by: PODIATRIST

## 2021-09-26 PROCEDURE — 76210000006 HC OR PH I REC 0.5 TO 1 HR: Performed by: PODIATRIST

## 2021-09-26 PROCEDURE — 87102 FUNGUS ISOLATION CULTURE: CPT

## 2021-09-26 PROCEDURE — 76060000032 HC ANESTHESIA 0.5 TO 1 HR: Performed by: PODIATRIST

## 2021-09-26 PROCEDURE — 87075 CULTR BACTERIA EXCEPT BLOOD: CPT

## 2021-09-26 PROCEDURE — 65660000000 HC RM CCU STEPDOWN

## 2021-09-26 PROCEDURE — 87040 BLOOD CULTURE FOR BACTERIA: CPT

## 2021-09-26 PROCEDURE — 2709999900 HC NON-CHARGEABLE SUPPLY: Performed by: PODIATRIST

## 2021-09-26 PROCEDURE — 87116 MYCOBACTERIA CULTURE: CPT

## 2021-09-26 PROCEDURE — 2709999900 HC NON-CHARGEABLE SUPPLY

## 2021-09-26 PROCEDURE — 74011000258 HC RX REV CODE- 258: Performed by: PODIATRIST

## 2021-09-26 PROCEDURE — 74011250636 HC RX REV CODE- 250/636: Performed by: PODIATRIST

## 2021-09-26 RX ORDER — SODIUM CHLORIDE, SODIUM LACTATE, POTASSIUM CHLORIDE, CALCIUM CHLORIDE 600; 310; 30; 20 MG/100ML; MG/100ML; MG/100ML; MG/100ML
INJECTION, SOLUTION INTRAVENOUS
Status: DISCONTINUED | OUTPATIENT
Start: 2021-09-26 | End: 2021-09-26 | Stop reason: HOSPADM

## 2021-09-26 RX ORDER — SODIUM CHLORIDE 0.9 % (FLUSH) 0.9 %
5-40 SYRINGE (ML) INJECTION EVERY 8 HOURS
Status: DISCONTINUED | OUTPATIENT
Start: 2021-09-26 | End: 2021-09-26 | Stop reason: HOSPADM

## 2021-09-26 RX ORDER — ONDANSETRON 2 MG/ML
4 INJECTION INTRAMUSCULAR; INTRAVENOUS
Status: DISCONTINUED | OUTPATIENT
Start: 2021-09-26 | End: 2021-10-01 | Stop reason: HOSPADM

## 2021-09-26 RX ORDER — MIDAZOLAM HYDROCHLORIDE 1 MG/ML
INJECTION, SOLUTION INTRAMUSCULAR; INTRAVENOUS AS NEEDED
Status: DISCONTINUED | OUTPATIENT
Start: 2021-09-26 | End: 2021-09-26 | Stop reason: HOSPADM

## 2021-09-26 RX ORDER — ONDANSETRON 4 MG/1
4 TABLET, ORALLY DISINTEGRATING ORAL
Status: DISCONTINUED | OUTPATIENT
Start: 2021-09-26 | End: 2021-10-01 | Stop reason: HOSPADM

## 2021-09-26 RX ORDER — PROPOFOL 10 MG/ML
INJECTION, EMULSION INTRAVENOUS
Status: DISCONTINUED | OUTPATIENT
Start: 2021-09-26 | End: 2021-09-26 | Stop reason: HOSPADM

## 2021-09-26 RX ORDER — LOSARTAN POTASSIUM 100 MG/1
100 TABLET ORAL EVERY EVENING
Status: DISCONTINUED | OUTPATIENT
Start: 2021-09-26 | End: 2021-10-01 | Stop reason: HOSPADM

## 2021-09-26 RX ORDER — SODIUM CHLORIDE 0.9 % (FLUSH) 0.9 %
5-40 SYRINGE (ML) INJECTION AS NEEDED
Status: DISCONTINUED | OUTPATIENT
Start: 2021-09-26 | End: 2021-09-26 | Stop reason: HOSPADM

## 2021-09-26 RX ORDER — POLYETHYLENE GLYCOL 3350 17 G/17G
17 POWDER, FOR SOLUTION ORAL DAILY PRN
Status: DISCONTINUED | OUTPATIENT
Start: 2021-09-26 | End: 2021-10-01 | Stop reason: HOSPADM

## 2021-09-26 RX ORDER — ROSUVASTATIN CALCIUM 10 MG/1
10 TABLET, COATED ORAL
Status: DISCONTINUED | OUTPATIENT
Start: 2021-09-26 | End: 2021-10-01 | Stop reason: HOSPADM

## 2021-09-26 RX ORDER — FENTANYL CITRATE 50 UG/ML
25 INJECTION, SOLUTION INTRAMUSCULAR; INTRAVENOUS
Status: DISCONTINUED | OUTPATIENT
Start: 2021-09-26 | End: 2021-09-26 | Stop reason: HOSPADM

## 2021-09-26 RX ORDER — FENTANYL CITRATE 50 UG/ML
INJECTION, SOLUTION INTRAMUSCULAR; INTRAVENOUS AS NEEDED
Status: DISCONTINUED | OUTPATIENT
Start: 2021-09-26 | End: 2021-09-26 | Stop reason: HOSPADM

## 2021-09-26 RX ORDER — DEXTROSE 50 % IN WATER (D50W) INTRAVENOUS SYRINGE
12.5-25 AS NEEDED
Status: DISCONTINUED | OUTPATIENT
Start: 2021-09-26 | End: 2021-10-01 | Stop reason: HOSPADM

## 2021-09-26 RX ORDER — LIDOCAINE HYDROCHLORIDE 20 MG/ML
INJECTION, SOLUTION EPIDURAL; INFILTRATION; INTRACAUDAL; PERINEURAL AS NEEDED
Status: DISCONTINUED | OUTPATIENT
Start: 2021-09-26 | End: 2021-09-26 | Stop reason: HOSPADM

## 2021-09-26 RX ORDER — GUAIFENESIN 100 MG/5ML
81 LIQUID (ML) ORAL DAILY
Status: DISCONTINUED | OUTPATIENT
Start: 2021-09-27 | End: 2021-10-01 | Stop reason: HOSPADM

## 2021-09-26 RX ORDER — LOSARTAN POTASSIUM 100 MG/1
100 TABLET ORAL DAILY
COMMUNITY

## 2021-09-26 RX ORDER — SODIUM CHLORIDE 0.9 % (FLUSH) 0.9 %
5-40 SYRINGE (ML) INJECTION EVERY 8 HOURS
Status: DISCONTINUED | OUTPATIENT
Start: 2021-09-26 | End: 2021-10-01 | Stop reason: HOSPADM

## 2021-09-26 RX ORDER — OXYCODONE HYDROCHLORIDE 5 MG/1
5 TABLET ORAL
Status: DISCONTINUED | OUTPATIENT
Start: 2021-09-26 | End: 2021-10-01 | Stop reason: HOSPADM

## 2021-09-26 RX ORDER — LIDOCAINE HYDROCHLORIDE 10 MG/ML
0.1 INJECTION, SOLUTION EPIDURAL; INFILTRATION; INTRACAUDAL; PERINEURAL AS NEEDED
Status: DISCONTINUED | OUTPATIENT
Start: 2021-09-26 | End: 2021-09-26 | Stop reason: HOSPADM

## 2021-09-26 RX ORDER — HYDROMORPHONE HYDROCHLORIDE 1 MG/ML
0.2 INJECTION, SOLUTION INTRAMUSCULAR; INTRAVENOUS; SUBCUTANEOUS
Status: DISCONTINUED | OUTPATIENT
Start: 2021-09-26 | End: 2021-09-26 | Stop reason: HOSPADM

## 2021-09-26 RX ORDER — INSULIN LISPRO 100 [IU]/ML
INJECTION, SOLUTION INTRAVENOUS; SUBCUTANEOUS
Status: DISCONTINUED | OUTPATIENT
Start: 2021-09-26 | End: 2021-10-01 | Stop reason: HOSPADM

## 2021-09-26 RX ORDER — MAGNESIUM SULFATE 100 %
4 CRYSTALS MISCELLANEOUS AS NEEDED
Status: DISCONTINUED | OUTPATIENT
Start: 2021-09-26 | End: 2021-10-01 | Stop reason: HOSPADM

## 2021-09-26 RX ORDER — AMIODARONE HYDROCHLORIDE 200 MG/1
200 TABLET ORAL DAILY
Status: DISCONTINUED | OUTPATIENT
Start: 2021-09-26 | End: 2021-09-26

## 2021-09-26 RX ORDER — PHENYLEPHRINE HCL IN 0.9% NACL 0.4MG/10ML
SYRINGE (ML) INTRAVENOUS AS NEEDED
Status: DISCONTINUED | OUTPATIENT
Start: 2021-09-26 | End: 2021-09-26 | Stop reason: HOSPADM

## 2021-09-26 RX ORDER — SODIUM CHLORIDE, SODIUM LACTATE, POTASSIUM CHLORIDE, CALCIUM CHLORIDE 600; 310; 30; 20 MG/100ML; MG/100ML; MG/100ML; MG/100ML
25 INJECTION, SOLUTION INTRAVENOUS CONTINUOUS
Status: DISCONTINUED | OUTPATIENT
Start: 2021-09-26 | End: 2021-09-26 | Stop reason: HOSPADM

## 2021-09-26 RX ORDER — BUPIVACAINE HYDROCHLORIDE 5 MG/ML
INJECTION, SOLUTION EPIDURAL; INTRACAUDAL AS NEEDED
Status: DISCONTINUED | OUTPATIENT
Start: 2021-09-26 | End: 2021-09-26 | Stop reason: HOSPADM

## 2021-09-26 RX ORDER — ACETAMINOPHEN 325 MG/1
650 TABLET ORAL
Status: DISCONTINUED | OUTPATIENT
Start: 2021-09-26 | End: 2021-10-01 | Stop reason: HOSPADM

## 2021-09-26 RX ORDER — ENOXAPARIN SODIUM 100 MG/ML
40 INJECTION SUBCUTANEOUS DAILY
Status: DISCONTINUED | OUTPATIENT
Start: 2021-09-26 | End: 2021-10-01 | Stop reason: HOSPADM

## 2021-09-26 RX ORDER — PROPOFOL 10 MG/ML
INJECTION, EMULSION INTRAVENOUS AS NEEDED
Status: DISCONTINUED | OUTPATIENT
Start: 2021-09-26 | End: 2021-09-26 | Stop reason: HOSPADM

## 2021-09-26 RX ORDER — PANTOPRAZOLE SODIUM 40 MG/1
40 TABLET, DELAYED RELEASE ORAL
Status: DISCONTINUED | OUTPATIENT
Start: 2021-09-26 | End: 2021-10-01 | Stop reason: HOSPADM

## 2021-09-26 RX ORDER — SODIUM CHLORIDE 0.9 % (FLUSH) 0.9 %
5-40 SYRINGE (ML) INJECTION AS NEEDED
Status: DISCONTINUED | OUTPATIENT
Start: 2021-09-26 | End: 2021-10-01 | Stop reason: HOSPADM

## 2021-09-26 RX ORDER — ACETAMINOPHEN 650 MG/1
650 SUPPOSITORY RECTAL
Status: DISCONTINUED | OUTPATIENT
Start: 2021-09-26 | End: 2021-10-01 | Stop reason: HOSPADM

## 2021-09-26 RX ORDER — DIPHENHYDRAMINE HYDROCHLORIDE 50 MG/ML
12.5 INJECTION, SOLUTION INTRAMUSCULAR; INTRAVENOUS AS NEEDED
Status: DISCONTINUED | OUTPATIENT
Start: 2021-09-26 | End: 2021-09-26 | Stop reason: HOSPADM

## 2021-09-26 RX ADMIN — PIPERACILLIN AND TAZOBACTAM 3.38 G: 3; .375 INJECTION, POWDER, LYOPHILIZED, FOR SOLUTION INTRAVENOUS at 13:01

## 2021-09-26 RX ADMIN — PROPOFOL 75 MCG/KG/MIN: 10 INJECTION, EMULSION INTRAVENOUS at 15:35

## 2021-09-26 RX ADMIN — PIPERACILLIN AND TAZOBACTAM 3.38 G: 3; .375 INJECTION, POWDER, LYOPHILIZED, FOR SOLUTION INTRAVENOUS at 05:59

## 2021-09-26 RX ADMIN — Medication 10 ML: at 13:03

## 2021-09-26 RX ADMIN — PANTOPRAZOLE SODIUM 40 MG: 40 TABLET, DELAYED RELEASE ORAL at 07:03

## 2021-09-26 RX ADMIN — SODIUM CHLORIDE, POTASSIUM CHLORIDE, SODIUM LACTATE AND CALCIUM CHLORIDE: 600; 310; 30; 20 INJECTION, SOLUTION INTRAVENOUS at 15:28

## 2021-09-26 RX ADMIN — PIPERACILLIN AND TAZOBACTAM 3.38 G: 3; .375 INJECTION, POWDER, LYOPHILIZED, FOR SOLUTION INTRAVENOUS at 21:44

## 2021-09-26 RX ADMIN — ENOXAPARIN SODIUM 40 MG: 40 INJECTION SUBCUTANEOUS at 09:58

## 2021-09-26 RX ADMIN — LIDOCAINE HYDROCHLORIDE 40 MG: 20 INJECTION, SOLUTION EPIDURAL; INFILTRATION; INTRACAUDAL; PERINEURAL at 15:35

## 2021-09-26 RX ADMIN — Medication 3 AMPULE: at 14:51

## 2021-09-26 RX ADMIN — Medication 10 ML: at 17:49

## 2021-09-26 RX ADMIN — LOSARTAN POTASSIUM 100 MG: 100 TABLET, FILM COATED ORAL at 17:48

## 2021-09-26 RX ADMIN — VANCOMYCIN HYDROCHLORIDE 750 MG: 750 INJECTION, POWDER, LYOPHILIZED, FOR SOLUTION INTRAVENOUS at 21:38

## 2021-09-26 RX ADMIN — Medication 80 MCG: at 15:59

## 2021-09-26 RX ADMIN — FENTANYL CITRATE 50 MCG: 50 INJECTION, SOLUTION INTRAMUSCULAR; INTRAVENOUS at 15:33

## 2021-09-26 RX ADMIN — PROPOFOL 40 MG: 10 INJECTION, EMULSION INTRAVENOUS at 15:36

## 2021-09-26 RX ADMIN — MIDAZOLAM HYDROCHLORIDE 2 MG: 1 INJECTION, SOLUTION INTRAMUSCULAR; INTRAVENOUS at 15:27

## 2021-09-26 RX ADMIN — Medication 80 MCG: at 16:09

## 2021-09-26 RX ADMIN — Medication 1 AMPULE: at 21:38

## 2021-09-26 RX ADMIN — Medication 10 ML: at 21:44

## 2021-09-26 RX ADMIN — VANCOMYCIN HYDROCHLORIDE 750 MG: 750 INJECTION, POWDER, LYOPHILIZED, FOR SOLUTION INTRAVENOUS at 09:47

## 2021-09-26 RX ADMIN — Medication 10 ML: at 05:59

## 2021-09-26 RX ADMIN — ROSUVASTATIN CALCIUM 10 MG: 10 TABLET, COATED ORAL at 21:37

## 2021-09-26 NOTE — PROGRESS NOTES
Vancomycin pharmacy to dose for ssti,  Zosyn3.375 gram iv q12h for ssti,  crcl=76.5,  Vancomycin dosing started at 2000 mg  and then 750 mg iv q12h to achieve an estimated auc/trough of 429 and 14.5 as per the insight rx calculator. Zosyn dosing adjusted to 3.375 gram iv q8h.  Krystle Birmingham, PHARMD

## 2021-09-26 NOTE — PERIOP NOTES
TRANSFER - IN REPORT:    Verbal report received from RACHELE FREDERICK(name) on Beatris April  being received from 3213(unit) for ordered procedure      Report consisted of patients Situation, Background, Assessment and   Recommendations(SBAR). Information from the following report(s) SBAR, Kardex, ED Summary, OR Summary, Procedure Summary, Intake/Output, MAR, Accordion, Recent Results, Med Rec Status, Cardiac Rhythm NSR, Alarm Parameters , Pre Procedure Checklist, Procedure Verification and Quality Measures was reviewed with the receiving nurse. Opportunity for questions and clarification was provided. Assessment completed upon patients arrival to unit and care assumed.

## 2021-09-26 NOTE — PROGRESS NOTES
TRANSFER - IN REPORT:    Verbal report received from Fareed Medeiros RN(name) on Sera Current  being received from [facility] for routine progression of care      Report consisted of patients Situation, Background, Assessment and   Recommendations(SBAR). Information from the following report(s) SBAR, Kardex, ED Summary, Intake/Output, MAR and Recent Results was reviewed with the receiving nurse. Opportunity for questions and clarification was provided. Assessment completed upon patients arrival to unit and care assumed.

## 2021-09-26 NOTE — PROGRESS NOTES
Admission assessment completed by this RN upon arrival to surgical-telemetry unit. Care plan and education initiated. Manny Bundy LPN to assume care of patient at this time.        Coleen England RN

## 2021-09-26 NOTE — ANESTHESIA PREPROCEDURE EVALUATION
Relevant Problems   CARDIOVASCULAR   (+) Aortic stenosis   (+) Severe aortic stenosis       Anesthetic History   No history of anesthetic complications            Review of Systems / Medical History  Patient summary reviewed, nursing notes reviewed and pertinent labs reviewed    Pulmonary                Comments: Former Smoker - Quit 2000   Neuro/Psych   Within defined limits           Cardiovascular    Hypertension  Valvular problems/murmurs: aortic stenosis      Dysrhythmias       Exercise tolerance: >4 METS  Comments: H/O Afib/flutter s/p ablation (2/201)      H/O Severe Aortic Stenosis s/p TAVR (3/24/21)    TTE (3/25/21):  ·LV: Estimated LVEF is 65 - 70%. Borderline hyperdynamic LV systolic function. Normal cavity size and systolic function (ejection fraction normal). Mild concentric hypertrophy. Mild (grade 1) left ventricular diastolic dysfunction. ·IAS: There was no shunting at baseline. ·AV: Prosthetic aortic valve. Aortic valve mean gradient is 8 mmHg. EOA 3.09 cm2. There is a 29 mm Giles (+3 mL) prosthetic aortic valve from prior TAVR procedure. Prosthesis is normal. Trace paravalvular leak. ·TV: Mild tricuspid valve regurgitation is present. Right Ventricular Arterial Pressure (RVSP) is 30 mmHg. Pulmonary hypertension not suggested by Doppler findings. ECG (3/25/21):  Normal sinus rhythm   Voltage criteria for left ventricular hypertrophy   When compared with ECG of 24-MAR-2021 14:20,   No significant change was found    GI/Hepatic/Renal               Comments: H/O GI Bleed Endo/Other    Diabetes: type 2    Anemia    Comments:  Thrombocytopenia (Platelets = 861N on 8/89/45) Other Findings   Comments: Right diabetic foot infection with great toe osteomyelitis         Physical Exam    Airway  Mallampati: II  TM Distance: > 6 cm  Neck ROM: normal range of motion   Mouth opening: Normal     Cardiovascular    Rhythm: regular  Rate: normal    Murmur, Aortic area     Dental    Dentition: Caps/crowns Pulmonary  Breath sounds clear to auscultation               Abdominal  GI exam deferred       Other Findings            Anesthetic Plan    ASA: 3  Anesthesia type: MAC and total IV anesthesia          Induction: Intravenous  Anesthetic plan and risks discussed with: Patient

## 2021-09-26 NOTE — ED NOTES
[facility] arrives. Nurse Nereyda Perry gives report to [facility] Staff. PCS, Encounter Summary, Facesheet, and Emtala form given to [facility]. EMTALA form Scanned.

## 2021-09-26 NOTE — BRIEF OP NOTE
Brief Postoperative Note    Patient: Aleks Whelan  YOB: 1945  MRN: [id number]    Date of Procedure: 9/26/2021     Pre-Op Diagnosis: Osteomyelitis right hallux    Post-Op Diagnosis: Same as preoperative diagnosis.       Procedure(s):  Debridement of wound RIGHT GREAT TOE including BONE     Surgeon(s):  Maxwell Echeverria DPM    Surgical Assistant: None    Anesthesia: MAC     Estimated Blood Loss (mL): Minimal    Complications: None    Specimens:   ID Type Source Tests Collected by Time Destination   1 : BONE BIOPSY RIGHT GREAT TOE Preservative Foot, Right  Estelle Stephens DPM 9/26/2021 1533 Pathology   1 : BONE CULTURES RIGHT GREAT TOE Bone Foot, Right ANAEROBIC/AEROBIC/GRAM STAIN, ACID FAST SMEAR+CULTURE Estelle Stephens DPM 9/26/2021 1534 Microbiology        Implants: * No implants in log *    Drains: * No LDAs found *    Findings: Soft, fragmented bone right hallux    Electronically Signed by Milagros Christianson DPM on 9/26/2021 at 4:16 PM

## 2021-09-26 NOTE — ED NOTES
[facility] RN Mati Christie has been contacted and made aware that the pt will be en route with [facility].

## 2021-09-26 NOTE — CONSULTS
Podiatry Consult    Subjective: Patient relates 1 year Hx of wound plantar right hallux treated with wound care, ABX and recently a skin substitute graft by Dr. Camelia Simmons as outpatient. The toe became red and swollen last week, and I saw him in the office since Dr. Camelia Simmons was out of town. X-rays in the office revealed early destructive changes to the medial hallux phalanges around the IPJ and the wound probed to bone. We discussed amputation vs. debridement and IV ABX, but he could not decide at the time. He was given an Rx for Augmentin and told to keep his scheduled F/U next week. He noticed worsening of the erythema and edema despite the ABX, and presented to the ED. Date of Consultation: 2021     Referring Physician: Dr. Aniya Redd MD    Patient is a 78 y.o.  male who is being seen for osteomyelitis right hallux. Patient Active Problem List    Diagnosis Date Noted    Diabetic foot ([address] 2021    S/P TAVR (transcatheter aortic valve replacement) 2021    Aortic stenosis 2021    Severe aortic stenosis 2021    GIB (gastrointestinal bleeding) 2021     Past Medical History:   Diagnosis Date    Aortic regurgitation     Aortic stenosis     Atrial fibrillation (HCC)     Colon polyps     Diabetes mellitus, type 2 (HCC)     GI bleed     HTN (hypertension)       Past Surgical History:   Procedure Laterality Date    COLONOSCOPY N/A 3/16/2021    COLONOSCOPY performed by Annie Vázquez MD at [facility] ENDOSCOPY    HX AFIB ABLATION  2021      History reviewed. No pertinent family history.    Social History     Tobacco Use    Smoking status: Former Smoker     Types: Cigarettes     Quit date:      Years since quittin.7    Smokeless tobacco: Never Used   Substance Use Topics    Alcohol use: Not on file     Current Facility-Administered Medications   Medication Dose Route Frequency Provider Last Rate Last Admin    sodium chloride (NS) flush 5-40 mL 5-40 mL IntraVENous Q8H Joel Larios MD   10 mL at 09/26/21 1303    sodium chloride (NS) flush 5-40 mL  5-40 mL IntraVENous PRN Pearl Finn MD        acetaminophen (TYLENOL) tablet 650 mg  650 mg Oral Q6H PRN Pearl Finn MD        Or    acetaminophen (TYLENOL) suppository 650 mg  650 mg Rectal Q6H PRN Pearl Finn MD        polyethylene glycol (MIRALAX) packet 17 g  17 g Oral DAILY PRN Pearl Finn MD        ondansetron (ZOFRAN ODT) tablet 4 mg  4 mg Oral Q8H PRN Pearl Finn MD        Or    ondansetron (ZOFRAN) injection 4 mg  4 mg IntraVENous Q6H PRN Pearl Finn MD        enoxaparin (LOVENOX) injection 40 mg  40 mg SubCUTAneous DAILY Pearl Finn MD   40 mg at 09/26/21 9993    amiodarone (CORDARONE) tablet 200 mg  200 mg Oral DAILY Pearl Finn MD        insulin lispro (HUMALOG) injection   SubCUTAneous AC&HS Pearl Finn MD        glucose chewable tablet 16 g  4 Tablet Oral PRN Pearl Finn MD        dextrose (D50W) injection syrg 12.5-25 g  12.5-25 g IntraVENous PRN Pearl Finn MD        glucagon (GLUCAGEN) injection 1 mg  1 mg IntraMUSCular PRN Pearl Finn MD        pantoprazole (PROTONIX) tablet 40 mg  40 mg Oral ACB Joel Larios MD   40 mg at 09/26/21 0703    rosuvastatin (CRESTOR) tablet 10 mg  10 mg Oral QHS Joel Larios MD        piperacillin-tazobactam (ZOSYN) 3.375 g in 0.9% sodium chloride (MBP/ADV) 100 mL MBP  3.375 g IntraVENous Q8H Joel Larios MD 25 mL/hr at 09/26/21 1301 3.375 g at 09/26/21 1301    vancomycin (VANCOCIN) 750 mg in 0.9% sodium chloride 250 mL (VIAL-MATE)  750 mg IntraVENous Q12H Pearl Finn  mL/hr at 09/26/21 0947 750 mg at 09/26/21 0947      No Known Allergies     Review of Systems:  AO x4. NAD. No fever or chills.     Objective:     Patient Vitals for the past 8 hrs:   BP Temp Pulse Resp SpO2   21 1119 (!) 143/80 99 °F (37.2 °C) 75 18 97 %   21 0736 127/83 97.4 °F (36.3 °C) 73 18 99 %     Temp (24hrs), Av.4 °F (36.9 °C), Min:97.4 °F (36.3 °C), Max:99 °F (37.2 °C)      Physical Exam Lower Extremities:    DP and PT 2/4 bilaterally; CFT less than 3 seconds. Normal hair growth. Erythema and edema right hallux. Open wound to bone and joint plantar medial right hallux with active thin pus drainage. Sensation absent to fine touch right hallux  No POP right hallux    X-rays: 3 views right hallux officially read as soft tissue edema, no gas, no bone destruction. However I can see subtle bone lysis surrounding the medial IPJ and joint space loss of the same area. MRI right foot: Soft tissue edema, No abscess formation. T1 and T2 signal changes to the distal and proximal hallux phalanges involving almost the entire toe. The 1st MPJ and 1st metatarsal head are not involved. Lab Review:   Recent Results (from the past 24 hour(s))   CBC WITH AUTOMATED DIFF    Collection Time: 21  2:34 PM   Result Value Ref Range    WBC 5.7 4.1 - 11.1 K/uL    RBC 3.60 (L) 4.10 - 5.70 M/uL    HGB 10.3 (L) 12.1 - 17.0 g/dL    HCT 31.5 (L) 36.6 - 50.3 %    MCV 87.5 80.0 - 99.0 FL    MCH 28.6 26.0 - 34.0 PG    MCHC 32.7 30.0 - 36.5 g/dL    RDW 14.2 11.5 - 14.5 %    PLATELET 078 (L) 035 - 400 K/uL    MPV 9.5 8.9 - 12.9 FL    NRBC 0.0 0  WBC    ABSOLUTE NRBC 0.00 0.00 - 0.01 K/uL    NEUTROPHILS 74 32 - 75 %    LYMPHOCYTES 12 12 - 49 %    MONOCYTES 13 5 - 13 %    EOSINOPHILS 1 0 - 7 %    BASOPHILS 0 0 - 1 %    IMMATURE GRANULOCYTES 0 0.0 - 0.5 %    ABS. NEUTROPHILS 4.2 1.8 - 8.0 K/UL    ABS. LYMPHOCYTES 0.7 (L) 0.8 - 3.5 K/UL    ABS. MONOCYTES 0.7 0.0 - 1.0 K/UL    ABS. EOSINOPHILS 0.1 0.0 - 0.4 K/UL    ABS. BASOPHILS 0.0 0.0 - 0.1 K/UL    ABS. IMM.  GRANS. 0.0 0.00 - 0.04 K/UL    DF SMEAR SCANNED      PLATELET COMMENTS Large Platelets      RBC COMMENTS NORMOCYTIC, NORMOCHROMIC     METABOLIC PANEL, BASIC    Collection Time: 09/25/21  2:34 PM   Result Value Ref Range    Sodium 133 (L) 136 - 145 mmol/L    Potassium 3.6 3.5 - 5.1 mmol/L    Chloride 103 97 - 108 mmol/L    CO2 24 21 - 32 mmol/L    Anion gap 6 5 - 15 mmol/L    Glucose 123 (H) 65 - 100 mg/dL    BUN 15 6 - 20 MG/DL    Creatinine 0.93 0.70 - 1.30 MG/DL    BUN/Creatinine ratio 16 12 - 20      GFR est AA >60 >60 ml/min/1.73m2    GFR est non-AA >60 >60 ml/min/1.73m2    Calcium 9.4 8.5 - 10.1 MG/DL   LACTIC ACID    Collection Time: 09/25/21  4:05 PM   Result Value Ref Range    Lactic acid 1.0 0.4 - 2.0 MMOL/L   CULTURE, BLOOD, PAIRED    Collection Time: 09/25/21  4:05 PM    Specimen: Blood   Result Value Ref Range    Special Requests: NO SPECIAL REQUESTS      Culture result: NO GROWTH AFTER 12 HOURS     GLUCOSE, POC    Collection Time: 09/26/21  7:03 AM   Result Value Ref Range    Glucose (POC) 159 (H) 65 - 117 mg/dL    Performed by Cleopatra Colvin LPN    GLUCOSE, POC    Collection Time: 09/26/21 11:18 AM   Result Value Ref Range    Glucose (POC) 131 (H) 65 - 117 mg/dL    Performed by Yousif Chapin        Impression:   1. Cellulitis right hallux  2. Septic arthritis right hallux IPJ  3. Osteomyelitis right hallux proximal and distal phalanges    Recommendation:   I spoke with the patient about the options of debridement and IV antibiotics versus amputation of the right hallux. My strongest recommendation was for amputation because in my experience I think he has a less than 50% chance of successfully clearing this infection without amputation, and there are still risks of toxicity from the ABX and even worsening of the infection despite the ABX. The patient is open to amputation, but at the urging of his wife he will attempt debridement and IV ABX at first. I will schedule this procedure for today.

## 2021-09-26 NOTE — PROGRESS NOTES
Problem: Falls - Risk of  Goal: *Absence of Falls  Description: Document Leafy Aguilar Fall Risk and appropriate interventions in the flowsheet.   Outcome: Progressing Towards Goal  Note: Fall Risk Interventions:            Medication Interventions: Teach patient to arise slowly                   Problem: Patient Education: Go to Patient Education Activity  Goal: Patient/Family Education  Outcome: Progressing Towards Goal

## 2021-09-26 NOTE — ED NOTES
Nursing report given to Dylon Lowe RN of [facility]. [facility] transport established with a ETA of 02:30.

## 2021-09-26 NOTE — H&P
Hospitalist Admission Note    NAME: Pernell Carpio   :  1945   MRN:  [id number]     Date/Time:  2021 3:40 AM    Patient PCP: Oscar Marrero MD  ________________________________________________________________________    My assessment of this patient's clinical condition and my plan of care is as follows. Assessment / Plan:    Infected diabetic foot POA with suspected osteomyelitis  Admit to general medical floor  MRI There is extensive soft tissue swelling involving the great toe. There is also  bone marrow edema involving most of the proximal and distal phalanges of the  great toe, concerning for osteomyelitis. No drainable soft tissue abscess is  Identified. Dr. Rae Longoria  probably do procedure this morning  Continue broad-spectrum empiric antibiotics: Zosyn and vancomycin  Send blood culture  Keep patient n.p.o. Lactic acid  level normal         History of aortic Stenosis severe and symptomatic with moderate AI: s/p TAVR 3/24/21 w/ Dr. Radha Keith and Dr. Bc Sanderson.      Chronic anemia    Current  hemoglobin is 10.3     Atrial fibrillation s/p Ablation in 2021: Had been on Xarelto but was discontinued due to GIB. On Amiodarone-restarted following TAVR.     HTN: Continue current medications     HLD: On Statin     DM II: Hold p.o. home medication and start sliding scale insulin here      Code Status:full   Surrogate Decision Maker: spouse     DVT Prophylaxis: Lovenox   GI Prophylaxis: not indicated     Baseline: independent lives with family            Subjective:   CHIEF COMPLAINT: Infected ulcer in the right foot. HISTORY OF PRESENT ILLNESS:     Pernell Carpio is a 78 y.o.  male who presents with an ulcer in the right great toe over the last few months. He had a skin graft done few days ago by podiatrist.  Pain he began to see swelling on the toes few days after the procedure. The whole foot started to's get swollen and painful. Denied any fever or chills.   Patient was started on p.o. medication did not help him much. Patient denied any chest pain shortness of breath. Patient denied urinary or bowel habit changes. We were asked to admit for work up and evaluation of the above problems. Past Medical History:   Diagnosis Date    Aortic regurgitation     Aortic stenosis     Atrial fibrillation (HCC)     Colon polyps     Diabetes mellitus, type 2 (HCC)     GI bleed     HTN (hypertension)         Past Surgical History:   Procedure Laterality Date    COLONOSCOPY N/A 3/16/2021    COLON[facility]don [facility]att [facility] ENDOSCOPY    HX AFIB ABLATION  2021       Social History     Tobacco Use    Smoking status: Former Smoker     Types: Cigarettes     Quit date:      Years since quittin.7    Smokeless tobacco: Never Used   Substance Use Topics    Alcohol use: Not on file        No family history on file. No Known Allergies     Prior to Admission medications    Medication Sig Start Date End Date Taking? Authorizing Provider   trimethoprim-sulfamethoxazole (Bactrim DS) 160-800 mg per tablet Take 2 Tablets by mouth two (2) times a day for 7 days. 9/25/21 10/2/21  JOSÉ MIGUEL More   aspirin 81 mg chewable tablet Take 1 Tab by mouth daily. 3/26/21   Gene Chu NP   amiodarone (CORDARONE) 200 mg tablet Take 200 mg by mouth. Provider, Historical   metFORMIN (GLUCOPHAGE) 500 mg tablet Take 500 mg by mouth daily (with breakfast). Provider, Historical   multivitamin (ONE A DAY) tablet Take 1 Tab by mouth daily. Provider, Historical   pantoprazole (PROTONIX) 40 mg tablet Take 40 mg by mouth daily. Provider, Historical   rosuvastatin (CRESTOR) 10 mg tablet Take 1 Tab by mouth nightly. Indications: hardening of the arteries due to plaque buildup 3/9/21   Glenny Del Rosario MD       REVIEW OF SYSTEMS:     I am not able to complete the review of systems because:    The patient is intubated and sedated    The patient has altered mental status due to his acute medical problems    The patient has baseline aphasia from prior stroke(s)    The patient has baseline dementia and is not reliable historian    The patient is in acute medical distress and unable to provide information           Total of 12 systems reviewed as follows:       POSITIVE= underlined text  Negative = text not underlined  General:  fever, chills, sweats, generalized weakness, weight loss/gain,      loss of appetite   Eyes:    blurred vision, eye pain, loss of vision, double vision  ENT:    rhinorrhea, pharyngitis   Respiratory:   cough, sputum production, SOB, MANCIA, wheezing, pleuritic pain   Cardiology:   chest pain, palpitations, orthopnea, PND, edema, syncope   Gastrointestinal:  abdominal pain , N/V, diarrhea, dysphagia, constipation, bleeding   Genitourinary:  frequency, urgency, dysuria, hematuria, incontinence   Muskuloskeletal :  arthralgia, myalgia, back pain  Hematology:  easy bruising, nose or gum bleeding, lymphadenopathy   Dermatological: rash, ulceration, pruritis, color change / jaundice  Endocrine:   hot flashes or polydipsia   Neurological:  headache, dizziness, confusion, focal weakness, paresthesia,     Speech difficulties, memory loss, gait difficulty  Psychological: Feelings of anxiety, depression, agitation    Objective:   VITALS:    Visit Vitals  /62 (BP 1 Location: Left upper arm, BP Patient Position: At rest;Semi fowlers)   Pulse 70   Temp 98.6 °F (37 °C)   Resp 16   SpO2 98%       PHYSICAL EXAM:    General:    Alert, cooperative, no distress, appears stated age. HEENT: Atraumatic, anicteric sclerae, pink conjunctivae     No oral ulcers, mucosa moist, throat clear, dentition fair  Neck:  Supple, symmetrical,  thyroid: non tender  Lungs:   Clear to auscultation bilaterally. No Wheezing or Rhonchi. No rales. Chest wall:  No tenderness  No Accessory muscle use. Heart:   Regular  rhythm,  No  murmur   No edema  Abdomen:   Soft, non-tender.  Not distended. Bowel sounds normal  Extremities: No cyanosis. No clubbing,      Skin turgor normal, Capillary refill normal, Radial dial pulse 2+  Right foot and right big toe area swollen and tender.    _______________________________________________________________________  Care Plan discussed with:    Comments   Patient y    Family      RN y    Care Manager                    Consultant:      _______________________________________________________________________  Expected  Disposition:   Home with Family y   HH/PT/OT/RN    SNF/LTC    [facility]    ________________________________________________________________________  TOTAL TIME:  72  Minutes    Critical Care Provided     Minutes non procedure based      Comments    y Reviewed previous records   >50% of visit spent in counseling and coordination of care  Discussion with patient and/or family and questions answered       ________________________________________________________________________  Signed: Belén Schafer MD    Procedures: see electronic medical records for all procedures/Xrays and details which were not copied into this note but were reviewed prior to creation of Plan. LAB DATA REVIEWED:    Recent Results (from the past 24 hour(s))   CBC WITH AUTOMATED DIFF    Collection Time: 09/25/21  2:34 PM   Result Value Ref Range    WBC 5.7 4.1 - 11.1 K/uL    RBC 3.60 (L) 4.10 - 5.70 M/uL    HGB 10.3 (L) 12.1 - 17.0 g/dL    HCT 31.5 (L) 36.6 - 50.3 %    MCV 87.5 80.0 - 99.0 FL    MCH 28.6 26.0 - 34.0 PG    MCHC 32.7 30.0 - 36.5 g/dL    RDW 14.2 11.5 - 14.5 %    PLATELET 576 (L) 614 - 400 K/uL    MPV 9.5 8.9 - 12.9 FL    NRBC 0.0 0  WBC    ABSOLUTE NRBC 0.00 0.00 - 0.01 K/uL    NEUTROPHILS 74 32 - 75 %    LYMPHOCYTES 12 12 - 49 %    MONOCYTES 13 5 - 13 %    EOSINOPHILS 1 0 - 7 %    BASOPHILS 0 0 - 1 %    IMMATURE GRANULOCYTES 0 0.0 - 0.5 %    ABS. NEUTROPHILS 4.2 1.8 - 8.0 K/UL    ABS. LYMPHOCYTES 0.7 (L) 0.8 - 3.5 K/UL    ABS.  MONOCYTES 0.7 0.0 - 1.0 K/UL    ABS. EOSINOPHILS 0.1 0.0 - 0.4 K/UL    ABS. BASOPHILS 0.0 0.0 - 0.1 K/UL    ABS. IMM.  GRANS. 0.0 0.00 - 0.04 K/UL    DF SMEAR SCANNED      PLATELET COMMENTS Large Platelets      RBC COMMENTS NORMOCYTIC, NORMOCHROMIC     METABOLIC PANEL, BASIC    Collection Time: 09/25/21  2:34 PM   Result Value Ref Range    Sodium 133 (L) 136 - 145 mmol/L    Potassium 3.6 3.5 - 5.1 mmol/L    Chloride 103 97 - 108 mmol/L    CO2 24 21 - 32 mmol/L    Anion gap 6 5 - 15 mmol/L    Glucose 123 (H) 65 - 100 mg/dL    BUN 15 6 - 20 MG/DL    Creatinine 0.93 0.70 - 1.30 MG/DL    BUN/Creatinine ratio 16 12 - 20      GFR est AA >60 >60 ml/min/1.73m2    GFR est non-AA >60 >60 ml/min/1.73m2    Calcium 9.4 8.5 - 10.1 MG/DL   LACTIC ACID    Collection Time: 09/25/21  4:05 PM   Result Value Ref Range    Lactic acid 1.0 0.4 - 2.0 MMOL/L

## 2021-09-26 NOTE — PERIOP NOTES
Handoff Report from Operating Room to PACU    Report received from [facility] and D Maximiliano CRNA regarding [facility]. Surgeon(s):  Keri Echeverria DPM  And Procedure(s) (LRB):  INCISION AND DRAINAGE RIGHT GREAT TOE WITH BONE BIOPSY (Right)  confirmed   with allergies and dressings discussed. Anesthesia type, drugs, patient history, complications, estimated blood loss, vital signs, intake and output, and last pain medication, lines and temperature were reviewed. 5:30 PM  TRANSFER - OUT REPORT:    Verbal report given to Roma Adamson (name) on [facility]  being transferred to Surg Tele (unit) for routine progression of care       Report consisted of patients Situation, Background, Assessment and   Recommendations(SBAR). Information from the following report(s) SBAR, Kardex, MAR and Accordion was reviewed with the receiving nurse. Lines:   Peripheral IV 09/25/21 Left Antecubital (Active)   Site Assessment Clean, dry, & intact 09/26/21 1700   Phlebitis Assessment 0 09/26/21 1700   Infiltration Assessment 0 09/26/21 1700   Dressing Status Clean, dry, & intact 09/26/21 1700   Dressing Type Transparent;Tape 09/26/21 1700   Hub Color/Line Status Purple;Capped 09/26/21 1700   Action Taken Open ports on tubing capped 09/26/21 0558   Alcohol Cap Used Yes 09/26/21 0558       Peripheral IV 09/26/21 Right Antecubital (Active)   Site Assessment Clean, dry, & intact 09/26/21 1700   Phlebitis Assessment 0 09/26/21 1700   Infiltration Assessment 0 09/26/21 1700   Dressing Status Clean, dry, & intact 09/26/21 1700   Dressing Type Transparent;Tape 09/26/21 1700   Hub Color/Line Status Pink;Capped 09/26/21 1700   Action Taken Open ports on tubing capped 09/26/21 0558   Alcohol Cap Used Yes 09/26/21 0558        Opportunity for questions and clarification was provided.       Patient transported with:   Registered Nurse

## 2021-09-26 NOTE — ANESTHESIA POSTPROCEDURE EVALUATION
Procedure(s):  INCISION AND DRAINAGE RIGHT GREAT TOE WITH BONE BIOPSY. MAC, total IV anesthesia    Anesthesia Post Evaluation        Patient location during evaluation: PACU  Note status: Adequate. Level of consciousness: responsive to verbal stimuli and sleepy but conscious  Pain management: satisfactory to patient  Airway patency: patent  Anesthetic complications: no  Cardiovascular status: acceptable  Respiratory status: acceptable  Hydration status: acceptable  Comments: +Post-Anesthesia Evaluation and Assessment    Patient: Radha Greer MRN: [id number]  SSN: [id number]   YOB: 1945  Age: 78 y.o. Sex: male      Cardiovascular Function/Vital Signs    /73   Pulse 64   Temp 36.5 °C (97.7 °F)   Resp 16   Ht 6' (1.829 m)   Wt 87.5 kg (192 lb 14.4 oz)   SpO2 99%   BMI 26.16 kg/m²     Patient is status post Procedure(s):  INCISION AND DRAINAGE RIGHT GREAT TOE WITH BONE BIOPSY. Nausea/Vomiting: Controlled. Postoperative hydration reviewed and adequate. Pain:  Pain Scale 1: Numeric (0 - 10) (09/26/21 1625)  Pain Intensity 1: 0 (09/26/21 1625)   Managed. Neurological Status:   Neuro (WDL): Within Defined Limits (09/26/21 1620)   At baseline. Mental Status and Level of Consciousness: Arousable. Pulmonary Status:   O2 Device: None (Room air) (09/26/21 1620)   Adequate oxygenation and airway patent. Complications related to anesthesia: None    Post-anesthesia assessment completed. No concerns. Signed By: Rocio Duke MD    9/26/2021  Post anesthesia nausea and vomiting:  controlled      INITIAL Post-op Vital signs:   Vitals Value Taken Time   /73 09/26/21 1630   Temp 36.5 °C (97.7 °F) 09/26/21 1620   Pulse 65 09/26/21 1635   Resp 20 09/26/21 1635   SpO2 97 % 09/26/21 1635   Vitals shown include unvalidated device data.

## 2021-09-26 NOTE — PROGRESS NOTES
End of Shift Note    Bedside shift change report given to RACHELE Haddad (oncoming nurse) by Sanju Phillips RN (offgoing nurse). Report included the following information SBAR, Kardex, OR Summary, Intake/Output and Recent Results    Shift worked:  7a to 7p     Shift summary and any significant changes:     Admitted overnight,  Surgery today with Dr. Naldo Stearns, Right great toe dressing had to be changed after it fell off when patient ambulated to bathroom,  Surgical site remains intact, scant amount of serosanguinous drainage noted. Vanc trough tomorrow am.     Concerns for physician to address:       Zone phone for oncoming shift:   [phone]       Activity:  Activity Level: Up ad xu  Number times ambulated in hallways past shift: up ad xu with post op shoe  Number of times OOB to chair past shift: up ad xu with post op shoe. Cardiac:   Cardiac Monitoring: Yes           Access:   Current line(s): PIV     Genitourinary:   Urinary status: voiding    Respiratory:   O2 Device: None (Room air)  Chronic home O2 use?: NO  Incentive spirometer at bedside: NO     GI:  Last Bowel Movement Date: 09/25/21  Current diet:  DIET NPO  Passing flatus: YES  Tolerating current diet: YES, NPO       Pain Management:   Patient states pain is manageable on current regimen: N/A    Skin:  Lux Score: 23  Interventions: increase time out of bed    Patient Safety:  Fall Score:  Total Score: 1  Interventions: gripper socks       Length of Stay:  Expected LOS: - - -  Actual LOS: 0      Sanju Phillips RN

## 2021-09-27 LAB
ALBUMIN SERPL-MCNC: 2.5 G/DL (ref 3.5–5)
ALBUMIN/GLOB SERPL: 0.6 {RATIO} (ref 1.1–2.2)
ALP SERPL-CCNC: 138 U/L (ref 45–117)
ALT SERPL-CCNC: 57 U/L (ref 12–78)
ANION GAP SERPL CALC-SCNC: 1 MMOL/L (ref 5–15)
AST SERPL-CCNC: 82 U/L (ref 15–37)
BASOPHILS # BLD: 0 K/UL (ref 0–0.1)
BASOPHILS NFR BLD: 1 % (ref 0–1)
BILIRUB SERPL-MCNC: 0.8 MG/DL (ref 0.2–1)
BUN SERPL-MCNC: 21 MG/DL (ref 6–20)
BUN/CREAT SERPL: 19 (ref 12–20)
CALCIUM SERPL-MCNC: 8.7 MG/DL (ref 8.5–10.1)
CHLORIDE SERPL-SCNC: 106 MMOL/L (ref 97–108)
CO2 SERPL-SCNC: 27 MMOL/L (ref 21–32)
CREAT SERPL-MCNC: 1.1 MG/DL (ref 0.7–1.3)
DATE LAST DOSE: NORMAL
DIFFERENTIAL METHOD BLD: ABNORMAL
EOSINOPHIL # BLD: 0 K/UL (ref 0–0.4)
EOSINOPHIL NFR BLD: 1 % (ref 0–7)
ERYTHROCYTE [DISTWIDTH] IN BLOOD BY AUTOMATED COUNT: 14.3 % (ref 11.5–14.5)
GLOBULIN SER CALC-MCNC: 4.2 G/DL (ref 2–4)
GLUCOSE BLD STRIP.AUTO-MCNC: 132 MG/DL (ref 65–117)
GLUCOSE BLD STRIP.AUTO-MCNC: 140 MG/DL (ref 65–117)
GLUCOSE BLD STRIP.AUTO-MCNC: 167 MG/DL (ref 65–117)
GLUCOSE BLD STRIP.AUTO-MCNC: 185 MG/DL (ref 65–117)
GLUCOSE SERPL-MCNC: 163 MG/DL (ref 65–100)
HCT VFR BLD AUTO: 28.3 % (ref 36.6–50.3)
HGB BLD-MCNC: 8.7 G/DL (ref 12.1–17)
IMM GRANULOCYTES # BLD AUTO: 0 K/UL (ref 0–0.04)
IMM GRANULOCYTES NFR BLD AUTO: 0 % (ref 0–0.5)
LYMPHOCYTES # BLD: 0.6 K/UL (ref 0.8–3.5)
LYMPHOCYTES NFR BLD: 16 % (ref 12–49)
MCH RBC QN AUTO: 28.1 PG (ref 26–34)
MCHC RBC AUTO-ENTMCNC: 30.7 G/DL (ref 30–36.5)
MCV RBC AUTO: 91.3 FL (ref 80–99)
MONOCYTES # BLD: 0.4 K/UL (ref 0–1)
MONOCYTES NFR BLD: 12 % (ref 5–13)
NEUTS SEG # BLD: 2.7 K/UL (ref 1.8–8)
NEUTS SEG NFR BLD: 70 % (ref 32–75)
NRBC # BLD: 0 K/UL (ref 0–0.01)
NRBC BLD-RTO: 0 PER 100 WBC
PLATELET # BLD AUTO: 107 K/UL (ref 150–400)
PMV BLD AUTO: 9.9 FL (ref 8.9–12.9)
POTASSIUM SERPL-SCNC: 3.9 MMOL/L (ref 3.5–5.1)
PROT SERPL-MCNC: 6.7 G/DL (ref 6.4–8.2)
RBC # BLD AUTO: 3.1 M/UL (ref 4.1–5.7)
RBC MORPH BLD: ABNORMAL
REPORTED DOSE,DOSE: NORMAL UNITS
REPORTED DOSE/TIME,TMG: NORMAL
SERVICE CMNT-IMP: ABNORMAL
SODIUM SERPL-SCNC: 134 MMOL/L (ref 136–145)
VANCOMYCIN TROUGH SERPL-MCNC: 9.8 UG/ML (ref 5–10)
WBC # BLD AUTO: 3.7 K/UL (ref 4.1–11.1)

## 2021-09-27 PROCEDURE — 74011250636 HC RX REV CODE- 250/636: Performed by: PODIATRIST

## 2021-09-27 PROCEDURE — 85025 COMPLETE CBC W/AUTO DIFF WBC: CPT

## 2021-09-27 PROCEDURE — 2709999900 HC NON-CHARGEABLE SUPPLY

## 2021-09-27 PROCEDURE — 74011250637 HC RX REV CODE- 250/637: Performed by: INTERNAL MEDICINE

## 2021-09-27 PROCEDURE — 65660000000 HC RM CCU STEPDOWN

## 2021-09-27 PROCEDURE — 82962 GLUCOSE BLOOD TEST: CPT

## 2021-09-27 PROCEDURE — 36415 COLL VENOUS BLD VENIPUNCTURE: CPT

## 2021-09-27 PROCEDURE — 74011250636 HC RX REV CODE- 250/636: Performed by: INTERNAL MEDICINE

## 2021-09-27 PROCEDURE — 74011636637 HC RX REV CODE- 636/637: Performed by: PODIATRIST

## 2021-09-27 PROCEDURE — 80053 COMPREHEN METABOLIC PANEL: CPT

## 2021-09-27 PROCEDURE — 74011000258 HC RX REV CODE- 258: Performed by: PODIATRIST

## 2021-09-27 PROCEDURE — 80202 ASSAY OF VANCOMYCIN: CPT

## 2021-09-27 PROCEDURE — 74011250637 HC RX REV CODE- 250/637: Performed by: PODIATRIST

## 2021-09-27 RX ORDER — AMOXICILLIN AND CLAVULANATE POTASSIUM 875; 125 MG/1; MG/1
1 TABLET, FILM COATED ORAL EVERY 12 HOURS
COMMUNITY
End: 2021-10-01

## 2021-09-27 RX ADMIN — PIPERACILLIN AND TAZOBACTAM 3.38 G: 3; .375 INJECTION, POWDER, LYOPHILIZED, FOR SOLUTION INTRAVENOUS at 20:57

## 2021-09-27 RX ADMIN — Medication 10 ML: at 06:00

## 2021-09-27 RX ADMIN — Medication 1 AMPULE: at 20:57

## 2021-09-27 RX ADMIN — VANCOMYCIN HYDROCHLORIDE 1000 MG: 1 INJECTION, POWDER, LYOPHILIZED, FOR SOLUTION INTRAVENOUS at 22:58

## 2021-09-27 RX ADMIN — Medication 10 ML: at 21:02

## 2021-09-27 RX ADMIN — INSULIN LISPRO 2 UNITS: 100 INJECTION, SOLUTION INTRAVENOUS; SUBCUTANEOUS at 18:19

## 2021-09-27 RX ADMIN — ASPIRIN 81 MG: 81 TABLET, CHEWABLE ORAL at 09:53

## 2021-09-27 RX ADMIN — VANCOMYCIN HYDROCHLORIDE 750 MG: 750 INJECTION, POWDER, LYOPHILIZED, FOR SOLUTION INTRAVENOUS at 09:54

## 2021-09-27 RX ADMIN — Medication 10 ML: at 18:20

## 2021-09-27 RX ADMIN — LOSARTAN POTASSIUM 100 MG: 100 TABLET, FILM COATED ORAL at 18:21

## 2021-09-27 RX ADMIN — PANTOPRAZOLE SODIUM 40 MG: 40 TABLET, DELAYED RELEASE ORAL at 05:30

## 2021-09-27 RX ADMIN — PIPERACILLIN AND TAZOBACTAM 3.38 G: 3; .375 INJECTION, POWDER, LYOPHILIZED, FOR SOLUTION INTRAVENOUS at 12:18

## 2021-09-27 RX ADMIN — ENOXAPARIN SODIUM 40 MG: 40 INJECTION SUBCUTANEOUS at 09:53

## 2021-09-27 RX ADMIN — ROSUVASTATIN CALCIUM 10 MG: 10 TABLET, COATED ORAL at 22:55

## 2021-09-27 RX ADMIN — PIPERACILLIN AND TAZOBACTAM 3.38 G: 3; .375 INJECTION, POWDER, LYOPHILIZED, FOR SOLUTION INTRAVENOUS at 05:25

## 2021-09-27 RX ADMIN — INSULIN LISPRO 2 UNITS: 100 INJECTION, SOLUTION INTRAVENOUS; SUBCUTANEOUS at 12:17

## 2021-09-27 RX ADMIN — Medication 1 AMPULE: at 09:53

## 2021-09-27 NOTE — PROGRESS NOTES
End of Shift Note    Bedside shift change report given to Karis Mondragon RN (oncoming nurse) by Bonilla Ashraf RN (offgoing nurse). Report included the following information SBAR, Kardex, Intake/Output, MAR and Recent Results    Shift worked:  [id number]     Shift summary and any significant changes:    No significant changes during shift. Dressing with small amount of drainage, dressing changed. Concerns for physician to address: None   Zone phone for oncoming shift:       Activity:  Activity Level: Up ad xu  Number times ambulated in hallways past shift: 0  Number of times OOB to chair past shift: 1    Cardiac:   Cardiac Monitoring: Yes      Cardiac Rhythm: Sinus Rhythm    Access:   Current line(s): PIV     Genitourinary:   Urinary status: voiding    Respiratory:   O2 Device: None (Room air)  Chronic home O2 use?: NO  Incentive spirometer at bedside: N/A     GI:  Last Bowel Movement Date: 09/25/21  Current diet:  ADULT DIET Regular; 4 carb choices (60 gm/meal)  Passing flatus: YES  Tolerating current diet: YES       Pain Management:   Patient states pain is manageable on current regimen: YES    Skin:  Lux Score: 21  Interventions: turn team and increase time out of bed    Patient Safety:  Fall Score:  Total Score: 2  Interventions: gripper socks and pt to call before getting OOB       Length of Stay:  Expected LOS: - - -  Actual LOS: 1      Bonilla Ashraf RN

## 2021-09-27 NOTE — WOUND CARE
Wound care nurse consult: consult placed for POA right toe wound. Patient was taken to the OR 9/26/21 by Dr Jeannie Mims for same wound, wound care was consulted on. [facility] nurse to defer to Dr Dudley Mena current orders and care. Spoke briefly with patient who stated he had no other wounds to either foot.     Dayron Danielle RN, [employer]

## 2021-09-27 NOTE — OP NOTES
[address]  OPERATIVE REPORT    Name:  Kennedy Levi  MR#:  [id number]  :  1945  ACCOUNT #:  [de-identified]  DATE OF SERVICE:  2021    PREOPERATIVE DIAGNOSIS:  Osteomyelitis, right hallux. POSTOPERATIVE DIAGNOSIS:  Osteomyelitis, right hallux. PROCEDURE PERFORMED:  Debridement of wound, right foot including bone. SURGEON:  Halie Echeverria DPM    ASSISTANT:  None. ANESTHESIA:  IV sedation with local.    COMPLICATIONS:  None. SPECIMENS REMOVED:  1. Surgical pathology, osteomyelitis, right hallux. 2.  Aerobic, anaerobic, acid fast and fungal cultures of bone, right hallux. IMPLANTS:  None. ESTIMATED BLOOD LOSS:  Minimal.    PROCEDURE IN DETAIL:  The patient was brought into the operating room and placed supine upon the OR table. After administration of IV sedation, I injected the right hallux with 7 mL of 0.5% plain Marcaine. The foot was prepped and draped in the usual sterile technique. A time-out was observed. Foot was elevated for 3 minutes before the inflation of a tourniquet around the right ankle to a pressure of 250 mmHg. Attention was directed to the wound on the plantar right hallux. The superficial aspects of the wound including callus and hypertrophic granulation tissue were sharply debrided with #15 blade. Further blunt exploration of the wound revealed that the wound probed directly to the bone of both the head of the proximal phalanx and base of the distal phalanx, and the joint and articular cartilage were clearly visible through the opening of the wound. There was thin seropurulent drainage from the joint. A small osteotome was used to take a portion of the head of the proximal phalanx from the toe and this will be sent to pathology as a surgical specimen. I then used a small rongeur to take several samples of bone for cultures as mentioned above. The bone was soft, easily removed and discolored.   The interior of the wound including bone was then thoroughly debrided with the ultrasonic Misonix debriding tool. Small fragments of bone were expressed through the opening of the wound during the process of debridement and a significant portion of the distal phalanx was broken up in this fashion and removed through the plantar wound. After this had been completed, a total of 400 mL of sterile saline had been used in the process. The interphalangeal joint was completely absent at this point with just a void between the proximal and distal phalanx and the plantar wound was approximately 1.5 cm from medial to lateral and 2-3 cm from proximal to distal.  I used 3-0 nylon in simple retention sutures to bring the wound edges closer together, but not completely close the wound to allow for drainage. The tourniquet was released and capillary refill was less than 3 seconds to all toes. The foot was cleaned and dried and then wrapped in dry sterile dressings with Adaptic over the wound. The patient was then transported to the recovery room with vital signs stable and vascular status intact. He will remain until stable for transfer back up to his hospital bed.         William Meyer DPM [id number]  D:  09/26/2021 16:27  T:  09/26/2021 22:34  JOB #:  [id number]

## 2021-09-27 NOTE — PROGRESS NOTES
Pharmacy Antimicrobial Kinetic Dosing    Indication for Antimicrobials: OM     Current Regimen of Each Antimicrobial:  Vancomycin 750mg IV q12h (Start Date ; Day # 2/5)  Zosyn 3.375g IV q8h (start ; day 2/5)    Previous Antimicrobial Therapy:    Goal Level: AUC: 400-600 mg/hr/Liter/day    Date Dose & Interval Measured (mcg/mL) Predicted AUC/KASEY    0955 750mg IV q12h 9.8 13.4/409                 Date & time of next level:     Dosing calculator used: [employer] calculator    Significant Positive Cultures:    pbcx - ngsf (pending)   bcx - ngsf (pending)   surgical specimen - ngsf (pending)   anaerobic -     Conditions for Dosing Consideration: Malnutrition    Labs:  Recent Labs     21  0155 21  1434   CREA 1.10 0.93   BUN 21* 15     Recent Labs     21  0155 21  1434   WBC 3.7* 5.7     Temp (24hrs), Av.2 °F (36.8 °C), Min:97.6 °F (36.4 °C), Max:99.3 °F (37.4 °C)        Creatinine Clearance (mL/min):   CrCl (Ideal Body Weight): 64.7   If actual weight < IBW: CrCl (Actual Body Weight) 72.9    Impression/Plan:   Continue Zosyn as ordered  Predicted AUC at SS below goal for OM. Will increase vancomycin to 1g IV q12h to give an estimated tr/AUC of 17.8/538  Bmp daily  Antimicrobial stop date 5 days ordered (monitor for adjustments)     Pharmacy will follow daily and adjust medications as appropriate for renal function and/or serum levels.     Thank you,  Erick Mcclain PHARMD    Vancomycin Dosing Document    Documents located on pharmacy Teams site: Clinical Practice -> Antimicrobial Stewardship -> Antibiotics_Vancomycin     Aminoglycoside Dosing Document    Documents located on pharmacy Teams site: Clinical Practice -> Antimicrobial Stewardship -> Antibiotics_Aminoglycosides

## 2021-09-27 NOTE — PROGRESS NOTES
Spiritual Care Partner Volunteer visited patient in [id number] Surg/Tele on 9.27.21.     Documented by Oracio Vazquez, Staff , on 9.27.21  Request  Support/Spiritual Care Services via [address] [place] [address]

## 2021-09-27 NOTE — PROGRESS NOTES
Pharmacy Medication Reconciliation     The patient was interviewed regarding current PTA medication list, use and drug allergies. The patient was questioned regarding use of any other inhalers, topical products, over the counter medications, herbal medications, vitamin products or ophthalmic/nasal/otic medication use. Allergy Update: Patient has no known allergies. Recommendations/Findings: The following amendments were made to the patient's active medication list on file at [facility]:   1) Additions: 1  -Amoxicillin/clavulanate 875/125 mg tablet    2) Deletions: 1  -Amiodarone 200 mg tablet    3) Changes: 0      Pertinent Findings: The patient was prescribed sulfamethoxazole/trimethoprim 160-800 mg tablet and amoxicillin/clavulanate 875-125 mg tablet on 9/25; however, the patient stated that he was only taking the amoxicillin/clavulanate. The patient states that his PCP told him to discontinue the amiodarone 200 mg tablet. Clarified PTA med list with patient. PTA medication list was corrected to the following:     Prior to Admission Medications   Prescriptions Last Dose Informant Taking?   amoxicillin-clavulanate (Augmentin) 875-125 mg per tablet 9/26/2021 at Unknown time  Yes   Sig: Take 1 Tablet by mouth every twelve (12) hours. aspirin delayed-release 81 mg tablet 9/26/2021 at Unknown time  Yes   Sig: Take 81 mg by mouth daily. losartan (COZAAR) 100 mg tablet 9/26/2021 at 2200  Yes   Sig: Take 100 mg by mouth daily. metFORMIN (GLUCOPHAGE) 500 mg tablet 9/26/2021 at Unknown time  Yes   Sig: Take 500 mg by mouth daily (with breakfast). multivitamin (ONE A DAY) tablet 9/26/2021 at Unknown time  Yes   Sig: Take 1 Tab by mouth daily. pantoprazole (PROTONIX) 40 mg tablet 9/26/2021 at Unknown time  Yes   Sig: Take 40 mg by mouth daily. rosuvastatin (CRESTOR) 10 mg tablet 9/26/2021 at 2200  Yes   Sig: Take 1 Tab by mouth nightly.  Indications: hardening of the arteries due to plaque buildup trimethoprim-sulfamethoxazole (Bactrim DS) 160-800 mg per tablet Not Taking at Unknown time  No   Sig: Take 2 Tablets by mouth two (2) times a day for 7 days.    Patient not taking: Reported on 9/27/2021      Facility-Administered Medications: None        Thank you,  Stephanie Patel

## 2021-09-27 NOTE — PROGRESS NOTES
Orders received, chart reviewed, pt cleared for participation with therapy by RN. Upon arrival to pt room, pt declining need for physical therapy evaluation, stating he has been up ad xu within room and is functioning at his baseline independent level. Educated pt regarding need for post-op shoe on RLE when ambulating or during any weightbearing tasks with pt reporting understanding and demonstrating independence w/ donning shoe. Spoke to RN who confirmed that pt has been up ad xu. Will complete PT order as pt is functioning at his baseline independent level.  Thank you    [place], PT, DPT

## 2021-09-27 NOTE — PROGRESS NOTES
Hospitalist Progress Note    NAME: Kishore Metcalf   :  1945   MRN:  [id number]     Admit date: 2021    Today's date: 21    PCP: Jade Nguyen MD      Anticipated discharge date:    Barriers:      Assessment / Plan:    Right toe diabetic foot infection with osteomyelitis POA  MRI right foot  at OSH      Extensive soft tissue swelling involving the great toe. Bone marrow edema involving most of the proximal/distal phalanges of great toe              Concerning for osteomyelitis. No drainable soft tissue abscess is identified. Taking augmentin at admit  IV Zosyn and vancomycin  Blood cultures no growth so far  OR  with Dr Destin Springer   Debridement of wound RIGHT GREAT TOE including BONE   Wound cultures and pathology pending  Await results for final antibiotics and ? Need for amputation  Consult ID in Am, they will need to follow antibiotics as outpatient    DM type II POA  Hold p.o. home Metformin  Sliding scale insulin  , 116, 161, 132, 140      Severe aortic Stenosis severe with moderate AI POA  s/p TAVR 3/24/21 w/ Dr. Oneil Sarmiento and Dr. Rama Gaviria.      Chronic anemia POA  Current  hemoglobin is 10.3     Atrial fibrillation s/p Ablation in 2021  Essential HTN POA  Hyperlipidemia POA  Prior  Xarelto but was discontinued due to GIB. Off Amiodarone per patient  Continue losartan  PRN hydralazine  Continue Statin    Overweight POA Body mass index is 26.16 kg/m².     Code Status:full     Surrogate Decision Maker: spouse     DVT Prophylaxis: Lovenox   GI Prophylaxis: not indicated     Baseline: independent lives with family    Subjective:     Chief Complaint / Reason for Physician Visit f/u right diabetic foot infection/osteomyelitis  \"no problems\". Discussed with RN events overnight.    OR yesterday, tissue and bone debridement    Review of Systems:  Symptom Y/N Comments  Symptom Y/N Comments   Fever/Chills n   Chest Pain n    Poor Appetite    Edema Cough n   Abdominal Pain n    Sputum    Joint Pain     SOB/MANCIA n   Headache     Nausea/vomit n   Tolerating PT/OT     Diarrhea n   Tolerating Diet y    Constipation    Other       Could NOT obtain due to:      Objective:     VITALS:   Last 24hrs VS reviewed since prior progress note. Most recent are:  Patient Vitals for the past 24 hrs:   Temp Pulse Resp BP SpO2   09/27/21 1224 97.4 °F (36.3 °C) 78 19 (!) 149/74 100 %   09/27/21 0750 98.4 °F (36.9 °C) 76 18 (!) 140/68 100 %   09/27/21 0407 99.3 °F (37.4 °C) 77 16 139/65 98 %   09/27/21 0400  72      09/27/21 0000  68      09/26/21 2000  78      09/26/21 1936 97.6 °F (36.4 °C) 85 16 136/63 98 %   09/26/21 1740 97.7 °F (36.5 °C) 68 16 (!) 149/91 99 %   09/26/21 1700  71 19 (!) 163/74 100 %   09/26/21 1650 97.8 °F (36.6 °C) 62 15 (!) 149/68 99 %   09/26/21 1645  74 14 (!) 143/63 98 %   09/26/21 1640  64 15 132/71 98 %   09/26/21 1635  65 20 136/69 97 %   09/26/21 1630  64 16 135/73 99 %   09/26/21 1625  65 14 (!) 120/57 97 %   09/26/21 1620 97.7 °F (36.5 °C) 68 17 118/74 98 %   09/26/21 1438 98.6 °F (37 °C) 75 18 (!) 149/71 99 %       Intake/Output Summary (Last 24 hours) at 9/27/2021 1232  Last data filed at 9/27/2021 0834  Gross per 24 hour   Intake 990 ml   Output 555 ml   Net 435 ml        Wt Readings from Last 12 Encounters:   09/26/21 87.5 kg (192 lb 14.4 oz)   03/25/21 79.4 kg (175 lb)   03/22/21 82.6 kg (182 lb 1.6 oz)   03/16/21 80.2 kg (176 lb 11.2 oz)   03/09/21 80.7 kg (178 lb)       PHYSICAL EXAM:    I had a face to face encounter and independently examined this patient on 09/27/21 as outlined below:    General: WD, WN. Alert, cooperative, no acute distress    EENT:  PERRL. Anicteric sclerae. MMM  Resp:  CTA bilaterally, no wheezing or rales. No accessory muscle use  CV:  Regular  rhythm,  No edema  GI:  Soft, Non distended, Non tender.   +Bowel sounds, no rebound  MS:  Right foot bandaged  Neurologic:  Alert and oriented X 3, normal speech, non focal motor exam  Psych:   Good insight. Not anxious nor agitated  Skin:  No rashes. No jaundice    Reviewed most current lab test results and cultures  YES  Reviewed most current radiology test results   YES  Review and summation of old records today    NO  Reviewed patient's current orders and MAR    YES  PMH/SH reviewed - no change compared to H&P  ________________________________________________________________________  Care Plan discussed with:    Comments   Patient y    Family      RN y    Care Manager     Consultant                        Multidiciplinary team rounds were held today with , nursing, pharmacist and clinical coordinator. Patient's plan of care was discussed; medications were reviewed and discharge planning was addressed. ________________________________________________________________________      Comments   >50% of visit spent in counseling and coordination of care     ________________________________________________________________________  Cherie Cordoba MD     Procedures: see electronic medical records for all procedures/Xrays and details which were not copied into this note but were reviewed prior to creation of Plan. LABS:  I reviewed today's most current labs and imaging studies.   Pertinent labs include:  Recent Labs     09/27/21  0155 09/25/21  1434   WBC 3.7* 5.7   HGB 8.7* 10.3*   HCT 28.3* 31.5*   * 111*     Recent Labs     09/27/21  0155 09/25/21  1434   * 133*   K 3.9 3.6    103   CO2 27 24   * 123*   BUN 21* 15   CREA 1.10 0.93   CA 8.7 9.4   ALB 2.5*  --    TBILI 0.8  --    ALT 57  --

## 2021-09-28 LAB
ALBUMIN SERPL-MCNC: 2.5 G/DL (ref 3.5–5)
ALBUMIN/GLOB SERPL: 0.5 {RATIO} (ref 1.1–2.2)
ALP SERPL-CCNC: 153 U/L (ref 45–117)
ALT SERPL-CCNC: 58 U/L (ref 12–78)
ANION GAP SERPL CALC-SCNC: 5 MMOL/L (ref 5–15)
AST SERPL-CCNC: 77 U/L (ref 15–37)
BASOPHILS # BLD: 0 K/UL (ref 0–0.1)
BASOPHILS NFR BLD: 0 % (ref 0–1)
BILIRUB SERPL-MCNC: 1.2 MG/DL (ref 0.2–1)
BUN SERPL-MCNC: 16 MG/DL (ref 6–20)
BUN/CREAT SERPL: 18 (ref 12–20)
CALCIUM SERPL-MCNC: 8.8 MG/DL (ref 8.5–10.1)
CHLORIDE SERPL-SCNC: 107 MMOL/L (ref 97–108)
CO2 SERPL-SCNC: 24 MMOL/L (ref 21–32)
COMMENT, HOLDF: NORMAL
CREAT SERPL-MCNC: 0.91 MG/DL (ref 0.7–1.3)
DIFFERENTIAL METHOD BLD: ABNORMAL
EOSINOPHIL # BLD: 0.1 K/UL (ref 0–0.4)
EOSINOPHIL NFR BLD: 2 % (ref 0–7)
ERYTHROCYTE [DISTWIDTH] IN BLOOD BY AUTOMATED COUNT: 14.2 % (ref 11.5–14.5)
EST. AVERAGE GLUCOSE BLD GHB EST-MCNC: 140 MG/DL
GLOBULIN SER CALC-MCNC: 4.7 G/DL (ref 2–4)
GLUCOSE BLD STRIP.AUTO-MCNC: 117 MG/DL (ref 65–117)
GLUCOSE BLD STRIP.AUTO-MCNC: 141 MG/DL (ref 65–117)
GLUCOSE BLD STRIP.AUTO-MCNC: 144 MG/DL (ref 65–117)
GLUCOSE BLD STRIP.AUTO-MCNC: 166 MG/DL (ref 65–117)
GLUCOSE SERPL-MCNC: 110 MG/DL (ref 65–100)
HBA1C MFR BLD: 6.5 % (ref 4–5.6)
HCT VFR BLD AUTO: 30 % (ref 36.6–50.3)
HGB BLD-MCNC: 9.5 G/DL (ref 12.1–17)
IMM GRANULOCYTES # BLD AUTO: 0 K/UL (ref 0–0.04)
IMM GRANULOCYTES NFR BLD AUTO: 0 % (ref 0–0.5)
LYMPHOCYTES # BLD: 0.6 K/UL (ref 0.8–3.5)
LYMPHOCYTES NFR BLD: 18 % (ref 12–49)
MCH RBC QN AUTO: 28.5 PG (ref 26–34)
MCHC RBC AUTO-ENTMCNC: 31.7 G/DL (ref 30–36.5)
MCV RBC AUTO: 90.1 FL (ref 80–99)
MONOCYTES # BLD: 0.4 K/UL (ref 0–1)
MONOCYTES NFR BLD: 11 % (ref 5–13)
NEUTS SEG # BLD: 2.2 K/UL (ref 1.8–8)
NEUTS SEG NFR BLD: 69 % (ref 32–75)
NRBC # BLD: 0 K/UL (ref 0–0.01)
NRBC BLD-RTO: 0 PER 100 WBC
PLATELET # BLD AUTO: 102 K/UL (ref 150–400)
PMV BLD AUTO: 9.4 FL (ref 8.9–12.9)
POTASSIUM SERPL-SCNC: 3.9 MMOL/L (ref 3.5–5.1)
PROT SERPL-MCNC: 7.2 G/DL (ref 6.4–8.2)
RBC # BLD AUTO: 3.33 M/UL (ref 4.1–5.7)
SAMPLES BEING HELD,HOLD: NORMAL
SERVICE CMNT-IMP: ABNORMAL
SERVICE CMNT-IMP: NORMAL
SODIUM SERPL-SCNC: 136 MMOL/L (ref 136–145)
WBC # BLD AUTO: 3.2 K/UL (ref 4.1–11.1)

## 2021-09-28 PROCEDURE — 85025 COMPLETE CBC W/AUTO DIFF WBC: CPT

## 2021-09-28 PROCEDURE — 74011636637 HC RX REV CODE- 636/637: Performed by: PODIATRIST

## 2021-09-28 PROCEDURE — 83036 HEMOGLOBIN GLYCOSYLATED A1C: CPT

## 2021-09-28 PROCEDURE — 74011250637 HC RX REV CODE- 250/637: Performed by: INTERNAL MEDICINE

## 2021-09-28 PROCEDURE — 74011250636 HC RX REV CODE- 250/636: Performed by: NURSE PRACTITIONER

## 2021-09-28 PROCEDURE — 74011250637 HC RX REV CODE- 250/637: Performed by: PODIATRIST

## 2021-09-28 PROCEDURE — 74011000258 HC RX REV CODE- 258: Performed by: PODIATRIST

## 2021-09-28 PROCEDURE — 74011250636 HC RX REV CODE- 250/636: Performed by: INTERNAL MEDICINE

## 2021-09-28 PROCEDURE — 36415 COLL VENOUS BLD VENIPUNCTURE: CPT

## 2021-09-28 PROCEDURE — 80053 COMPREHEN METABOLIC PANEL: CPT

## 2021-09-28 PROCEDURE — 74011250636 HC RX REV CODE- 250/636: Performed by: PODIATRIST

## 2021-09-28 PROCEDURE — 82962 GLUCOSE BLOOD TEST: CPT

## 2021-09-28 PROCEDURE — 99253 IP/OBS CNSLTJ NEW/EST LOW 45: CPT | Performed by: STUDENT IN AN ORGANIZED HEALTH CARE EDUCATION/TRAINING PROGRAM

## 2021-09-28 PROCEDURE — 2709999900 HC NON-CHARGEABLE SUPPLY

## 2021-09-28 PROCEDURE — 65660000000 HC RM CCU STEPDOWN

## 2021-09-28 RX ORDER — LORAZEPAM 1 MG/1
4 TABLET ORAL
Status: DISCONTINUED | OUTPATIENT
Start: 2021-09-28 | End: 2021-10-01 | Stop reason: HOSPADM

## 2021-09-28 RX ORDER — LORAZEPAM 1 MG/1
2 TABLET ORAL
Status: DISCONTINUED | OUTPATIENT
Start: 2021-09-28 | End: 2021-10-01 | Stop reason: HOSPADM

## 2021-09-28 RX ORDER — ASPIRIN 325 MG/1
100 TABLET, FILM COATED ORAL DAILY
Status: DISCONTINUED | OUTPATIENT
Start: 2021-09-29 | End: 2021-10-01 | Stop reason: HOSPADM

## 2021-09-28 RX ORDER — HYDRALAZINE HYDROCHLORIDE 20 MG/ML
20 INJECTION INTRAMUSCULAR; INTRAVENOUS
Status: DISCONTINUED | OUTPATIENT
Start: 2021-09-28 | End: 2021-10-01 | Stop reason: HOSPADM

## 2021-09-28 RX ORDER — AMLODIPINE BESYLATE 5 MG/1
2.5 TABLET ORAL DAILY
Status: DISCONTINUED | OUTPATIENT
Start: 2021-09-29 | End: 2021-10-01 | Stop reason: HOSPADM

## 2021-09-28 RX ORDER — HYDRALAZINE HYDROCHLORIDE 20 MG/ML
10 INJECTION INTRAMUSCULAR; INTRAVENOUS
Status: DISCONTINUED | OUTPATIENT
Start: 2021-09-28 | End: 2021-09-28

## 2021-09-28 RX ADMIN — HYDRALAZINE HYDROCHLORIDE 10 MG: 20 INJECTION INTRAMUSCULAR; INTRAVENOUS at 11:49

## 2021-09-28 RX ADMIN — PIPERACILLIN AND TAZOBACTAM 3.38 G: 3; .375 INJECTION, POWDER, LYOPHILIZED, FOR SOLUTION INTRAVENOUS at 13:58

## 2021-09-28 RX ADMIN — ASPIRIN 81 MG: 81 TABLET, CHEWABLE ORAL at 08:16

## 2021-09-28 RX ADMIN — Medication 10 ML: at 05:54

## 2021-09-28 RX ADMIN — ENOXAPARIN SODIUM 40 MG: 40 INJECTION SUBCUTANEOUS at 08:16

## 2021-09-28 RX ADMIN — INSULIN LISPRO 2 UNITS: 100 INJECTION, SOLUTION INTRAVENOUS; SUBCUTANEOUS at 11:48

## 2021-09-28 RX ADMIN — Medication 1 AMPULE: at 08:19

## 2021-09-28 RX ADMIN — HYDRALAZINE HYDROCHLORIDE 20 MG: 20 INJECTION INTRAMUSCULAR; INTRAVENOUS at 17:19

## 2021-09-28 RX ADMIN — ROSUVASTATIN CALCIUM 10 MG: 10 TABLET, COATED ORAL at 21:56

## 2021-09-28 RX ADMIN — PIPERACILLIN AND TAZOBACTAM 3.38 G: 3; .375 INJECTION, POWDER, LYOPHILIZED, FOR SOLUTION INTRAVENOUS at 21:56

## 2021-09-28 RX ADMIN — Medication 10 ML: at 13:58

## 2021-09-28 RX ADMIN — Medication 10 ML: at 22:03

## 2021-09-28 RX ADMIN — Medication 1 AMPULE: at 22:00

## 2021-09-28 RX ADMIN — PIPERACILLIN AND TAZOBACTAM 3.38 G: 3; .375 INJECTION, POWDER, LYOPHILIZED, FOR SOLUTION INTRAVENOUS at 05:55

## 2021-09-28 RX ADMIN — PANTOPRAZOLE SODIUM 40 MG: 40 TABLET, DELAYED RELEASE ORAL at 05:54

## 2021-09-28 RX ADMIN — VANCOMYCIN HYDROCHLORIDE 1000 MG: 1 INJECTION, POWDER, LYOPHILIZED, FOR SOLUTION INTRAVENOUS at 10:35

## 2021-09-28 RX ADMIN — LOSARTAN POTASSIUM 100 MG: 100 TABLET, FILM COATED ORAL at 17:13

## 2021-09-28 RX ADMIN — INSULIN LISPRO 2 UNITS: 100 INJECTION, SOLUTION INTRAVENOUS; SUBCUTANEOUS at 15:51

## 2021-09-28 NOTE — PROGRESS NOTES
Received notification from bedside RN about patient with regards to: elevated BP, needs PRN medication  VS: /79, HR 73, RR 18, O2 sat 99% on RA    Intervention given: Hydralazine IV prn ordered

## 2021-09-28 NOTE — PROGRESS NOTES
End of Shift Note    Bedside shift change report given to RACHELE Haddad (oncoming nurse) by Meenakshi Mccartney RN (offgoing nurse). Report included the following information SBAR and Kardex    Shift worked:  7a-7p     Shift summary and any significant changes:     Pt had good shift, no complaints of pain. Uneventful shift. Concerns for physician to address:  none     Zone phone for oncoming shift:   [phone]       Activity:  Activity Level: Up ad xu  Number times ambulated in hallways past shift: 0  Number of times OOB to chair past shift: 1    Cardiac:   Cardiac Monitoring: No      Cardiac Rhythm: Sinus Rhythm    Access:   Current line(s): PIV     Genitourinary:   Urinary status: voiding    Respiratory:   O2 Device: None (Room air)  Chronic home O2 use?: NO  Incentive spirometer at bedside: YES     GI:  Last Bowel Movement Date: 09/25/21  Current diet:  ADULT DIET Regular; 4 carb choices (60 gm/meal)  Passing flatus: YES  Tolerating current diet: YES       Pain Management:   Patient states pain is manageable on current regimen: YES    Skin:  Lux Score: 22  Interventions: increase time out of bed, PT/OT consult and limit briefs    Patient Safety:  Fall Score:  Total Score: 1  Interventions: gripper socks, pt to call before getting OOB and stay with me (per policy)       Length of Stay:  Expected LOS: 2d 21h  Actual LOS: [address], RN

## 2021-09-28 NOTE — PROGRESS NOTES
Received a phone call from pts wife. Updated on pts condition. Pts wife has requested that Dr. Sandee Batista be consulted by infectious disease if needed.  [phone]

## 2021-09-28 NOTE — PROGRESS NOTES
Transition of Care Plan:    RUR: 14% LOW  Disposition: TBD- home w/ f/u appts & wife  Follow up appointments: PCP & specialists  DME needed: TBD  Transportation at Discharge: Wife  Gueraine Beams or means to access home:   Yes     IM Medicare Letter: N/A  Is patient a BCPI-A Bundle:  No         If yes, was Bundle Letter given?:   N/A  Caregiver Contact: LNOK/ Wife- Ryan Nunn, [phone]  Discharge Caregiver contacted prior to discharge? CM reviewed pt's chart. Podiatry following for R toes infection w/ osteomyelitis; path & cultures currently pending to determine need for amputation & final abx recs. ID consulted. CM met w/ pt at bedside to introduce role & complete initial assessment. Pt confirmed demographic information is up to date. Pt lives w/ his wife in 2 level/ [facility] house. Reports being independent w/ ADLs/ IADLs & drives at baseline. Pt still working full-time. Reports no use of DME or home O2. Denies prior hx of rehab or HH. CM discussed HH at d/c pending path/ cultures; pt verbalized understanding but declines at this time. Pt use the [facility] on [address] Yesenia Ojeda to transport at d/c. No further questions for CM at this time. Will continue to follow.     Reason for Admission:  Diabetic foot (HCC)/ osteomyelitis right hallux                   RUR Score:  14%                   Plan for utilizing home health:   Could benefit if d/c home w/ IV abx; PT cleared consult as pt is currently at this baseline    PCP: First and Last name:  Ramila Dobbins MD   Name of Practice: [facility]   Are you a current patient: Yes/No: Yes   Approximate date of last visit: >1 year   Can you participate in a virtual visit with your PCP:                     Current Advanced Directive/Advance Care Plan: Full Code  Advance Care Planning     General Advance Care Planning (ACP) Conversation  Date of Conversation: 9/28/2021  Conducted with: Patient with [facility] Maker: LNOK/ Wife- Ryan Nunn, [phone]  No healthcare decision makers have been documented. Click here to complete [address] including selection of the Healthcare Decision Maker Relationship (ie \"Primary\")    Today we documented Decision Maker(s) consistent with Legal Next of Kin hierarchy. Content/Action Overview:   Has NO ACP documents/care preferences - information provided, considering goals and options  Reviewed DNR/DNI and patient elects Full Code (Attempt Resuscitation)    Length of Voluntary ACP Conversation in minutes:  <16 minutes (Non-Billable)    [facility] Management Interventions  PCP Verified by CM: Yes  Palliative Care Criteria Met (RRAT>21 & CHF Dx)?: No  Mode of Transport at Discharge:  Other (see comment) (Wife)  Transition of Care Consult (CM Consult): Discharge Planning  Discharge Durable Medical Equipment: No  Physical Therapy Consult: No  Occupational Therapy Consult: No  Support Systems: Spouse/Significant Other, Child(carissa) (l[facility] in 2 level[facility])  Confirm Follow Up Transport: Self (wife as needed)  Discharge Location  Discharge Placement: Home with family assistance (& f/u appts)    HINA Parham  Care Management

## 2021-09-28 NOTE — PROGRESS NOTES
1300- Spoke with Eleuterio Jones Pharmacist regarding hydralazine order. Pt received dose at 1149 but BP remains elevated. Hydralizine dose increased from 10 to 20 mg by MD. Per pharmacist next dose should wait for 6 hours between the two or around 4 pm if still needed. 1500- Dr. Nish thomas on pt. Informed wife would like an update. End of Shift Note    Bedside shift change report given to [address] [place] [address] Rd (oncoming nurse) by Kelle Lundy (offgoing nurse). Report included the following information SBAR, Kardex, Procedure Summary, Intake/Output, MAR and Recent Results    Shift worked:  7a-7p     Shift summary and any significant changes:     No complaints of pain. Pt tolerating diet. Up in room ad xu. Received scheduled antibiotics. PRN hydralazine given for elevated BP. Dressing being managed by podiatry. Concerns for physician to address:  Hypertension      Zone phone for oncoming shift:          Activity:  Activity Level: Up ad xu  Number times ambulated in hallways past shift: 0  Number of times OOB to chair past shift: 0    Cardiac:   Cardiac Monitoring: No      Cardiac Rhythm: Sinus Rhythm    Access:   Current line(s): PIV     Genitourinary:   Urinary status: voiding    Respiratory:   O2 Device: None (Room air)  Chronic home O2 use?: NO  Incentive spirometer at bedside: YES     GI:  Last Bowel Movement Date: 09/25/21  Current diet:  ADULT DIET Regular; 4 carb choices (60 gm/meal)  Passing flatus: YES  Tolerating current diet: YES       Pain Management:   Patient states pain is manageable on current regimen: YES    Skin:  Lux Score: 21  Interventions: increase time out of bed    Patient Safety:  Fall Score:  Total Score: 1  Interventions: gripper socks       Length of Stay:  Expected LOS: 2d 21h  Actual LOS: [address]

## 2021-09-28 NOTE — CONSULTS
Infectious Disease Consult Note    Reason for Consult: Right hallux OM  Date of Consultation: September 28, 2021  Date of Admission: 9/26/2021  Referring Physician: Dr. Nora Arerola      HPI:  Ashley Mullen is a 78y.o. year old male with history of DM2, who presented to the ED on 9/26/2021 for worsening right hallux wound. Patient reports that an ulcer started on his toe after what he thinks could be from his about a year ago. Since then the wound has not completely healed up. He has been followed by Dr. Delfino Donovan podiatrist as outpatient. He has received some antibiotic courses in the past year. He had graft placement at the wound on June 30, 2021 as there was no concern for active infection at that time. Patient reports that the wound has intermittently had some drainage but does not reported to be thick or yellow. He denies any fevers or chills. I discussed case with Dr. Nora Arreola today and the patient had wound cultures taken on 8/16/2021 which grew Streptococcus pyogenes, Pseudomonas and E. coli. We do not have the reports of this available at this time. Patient was put on oral Augmentin about a week prior to the current admission; patient was still taking Augmentin just before getting admitted here. So far in the hospital course patient has been afebrile and hemodynamically stable. No leukocytosis on admission. MRI of the foot showed:    IMPRESSION     1. Osteomyelitis of the first proximal and distal phalanges. 2. Soft tissue ulceration has a sinus tract to the first IP joint. Possible  septic arthritis of the first IP joint. Surrounding cellulitis. No drainable  abscess within the limitation of no intravenous contrast.    On 9/26/2021 underwent debridement of the right toe. Cultures are so far pending. Gram stain is negative for any organisms. Blood cultures have been negative so far as well. Patient has been on vancomycin and Zosyn since admission.   Currently reports feeling well and has no acute complaints. He lives at home with his wife. He does not smoke or drink. He still works in the banking office.       Past Medical History:  Past Medical History:   Diagnosis Date    Aortic regurgitation     Aortic stenosis     Atrial fibrillation ([address]     Colon polyps     Diabetes mellitus, type 2 (HCC)     GI bleed     HTN (hypertension)          Surgical History:  Past Surgical History:   Procedure Laterality Date    COLONOSCOPY N/A 3/16/2021    COLONOSCOPY performed by Talmage Hashimoto., [facility] at [facility] ENDOSCOPY    HX AFIB ABLATION  02/2021         Family History:   History reviewed. No pertinent family history. Social History: In HPI      Allergies:  No Known Allergies      Review of Systems:     Negative except as in HPI    Medications:  No current facility-administered medications on file prior to encounter. Current Outpatient Medications on File Prior to Encounter   Medication Sig Dispense Refill    amoxicillin-clavulanate (Augmentin) 875-125 mg per tablet Take 1 Tablet by mouth every twelve (12) hours.  losartan (COZAAR) 100 mg tablet Take 100 mg by mouth daily.  aspirin delayed-release 81 mg tablet Take 81 mg by mouth daily.  metFORMIN (GLUCOPHAGE) 500 mg tablet Take 500 mg by mouth daily (with breakfast).  multivitamin (ONE A DAY) tablet Take 1 Tab by mouth daily.  pantoprazole (PROTONIX) 40 mg tablet Take 40 mg by mouth daily.  rosuvastatin (CRESTOR) 10 mg tablet Take 1 Tab by mouth nightly. Indications: hardening of the arteries due to plaque buildup 90 Tab 4    trimethoprim-sulfamethoxazole (Bactrim DS) 160-800 mg per tablet Take 2 Tablets by mouth two (2) times a day for 7 days.  (Patient not taking: Reported on 9/27/2021) 28 Tablet 0           Physical Exam:    Vitals:   Patient Vitals for the past 24 hrs:   Temp Pulse Resp BP SpO2   09/28/21 1301    (!) 175/85    09/28/21 1114 98.2 °F (36.8 °C) 80 18 (!) 184/77 100 %   09/28/21 0735 98.1 °F (36.7 °C) 73 18 (!) 174/80 100 %   09/28/21 0528  75  (!) 167/82    09/28/21 0328    (!) 187/79    09/28/21 0234 97.7 °F (36.5 °C) 73 18 (!) 199/105 99 %   09/28/21 0018 98.3 °F (36.8 °C) 69 18 (!) 163/75 100 %   09/27/21 1930 98.5 °F (36.9 °C) 80 18 (!) 149/72 98 %   09/27/21 1522 98.3 °F (36.8 °C) 74 19 (!) 140/68 100 %   ·   · GEN: NAD  · HEENT: Normocephalic, atraumatic, PERRL, no scleral icterus  · CV: Hemodynamically stable  · Lungs: Clear to auscultation bilaterally  · Abdomen: soft, non distended, non tender,  · Genitourinary:  no ward  · Extremities: no edema  · Neuro: Alert, oriented to time, place and situation, moves all extremities to commands, verbal   · Skin: Right foot is under dressing  · Psych: good affect, good eye contact, non tearful   Lines: Peripheral IV lines    Labs:   Recent Results (from the past 24 hour(s))   GLUCOSE, POC    Collection Time: 09/27/21  3:55 PM   Result Value Ref Range    Glucose (POC) 167 (H) 65 - 117 mg/dL    Performed by Jennifer Shah PCT    GLUCOSE, POC    Collection Time: 09/27/21  9:05 PM   Result Value Ref Range    Glucose (POC) 185 (H) 65 - 117 mg/dL    Performed by Shaheen Olivarez (PCT)    CBC WITH AUTOMATED DIFF    Collection Time: 09/28/21  3:31 AM   Result Value Ref Range    WBC 3.2 (L) 4.1 - 11.1 K/uL    RBC 3.33 (L) 4.10 - 5.70 M/uL    HGB 9.5 (L) 12.1 - 17.0 g/dL    HCT 30.0 (L) 36.6 - 50.3 %    MCV 90.1 80.0 - 99.0 FL    MCH 28.5 26.0 - 34.0 PG    MCHC 31.7 30.0 - 36.5 g/dL    RDW 14.2 11.5 - 14.5 %    PLATELET 572 (L) 121 - 400 K/uL    MPV 9.4 8.9 - 12.9 FL    NRBC 0.0 0  WBC    ABSOLUTE NRBC 0.00 0.00 - 0.01 K/uL    NEUTROPHILS 69 32 - 75 %    LYMPHOCYTES 18 12 - 49 %    MONOCYTES 11 5 - 13 %    EOSINOPHILS 2 0 - 7 %    BASOPHILS 0 0 - 1 %    IMMATURE GRANULOCYTES 0 0.0 - 0.5 %    ABS. NEUTROPHILS 2.2 1.8 - 8.0 K/UL    ABS. LYMPHOCYTES 0.6 (L) 0.8 - 3.5 K/UL    ABS. MONOCYTES 0.4 0.0 - 1.0 K/UL    ABS.  EOSINOPHILS 0.1 0.0 - 0.4 K/UL ABS. BASOPHILS 0.0 0.0 - 0.1 K/UL    ABS. IMM. GRANS. 0.0 0.00 - 0.04 K/UL    DF AUTOMATED     METABOLIC PANEL, COMPREHENSIVE    Collection Time: 09/28/21  3:31 AM   Result Value Ref Range    Sodium 136 136 - 145 mmol/L    Potassium 3.9 3.5 - 5.1 mmol/L    Chloride 107 97 - 108 mmol/L    CO2 24 21 - 32 mmol/L    Anion gap 5 5 - 15 mmol/L    Glucose 110 (H) 65 - 100 mg/dL    BUN 16 6 - 20 MG/DL    Creatinine 0.91 0.70 - 1.30 MG/DL    BUN/Creatinine ratio 18 12 - 20      GFR est AA >60 >60 ml/min/1.73m2    GFR est non-AA >60 >60 ml/min/1.73m2    Calcium 8.8 8.5 - 10.1 MG/DL    Bilirubin, total 1.2 (H) 0.2 - 1.0 MG/DL    ALT (SGPT) 58 12 - 78 U/L    AST (SGOT) 77 (H) 15 - 37 U/L    Alk. phosphatase 153 (H) 45 - 117 U/L    Protein, total 7.2 6.4 - 8.2 g/dL    Albumin 2.5 (L) 3.5 - 5.0 g/dL    Globulin 4.7 (H) 2.0 - 4.0 g/dL    A-G Ratio 0.5 (L) 1.1 - 2.2     HEMOGLOBIN A1C WITH EAG    Collection Time: 09/28/21  3:31 AM   Result Value Ref Range    Hemoglobin A1c 6.5 (H) 4.0 - 5.6 %    Est. average glucose 140 mg/dL   SAMPLES BEING HELD    Collection Time: 09/28/21  3:31 AM   Result Value Ref Range    SAMPLES BEING HELD  PST     COMMENT        Add-on orders for these samples will be processed based on acceptable specimen integrity and analyte stability, which may vary by analyte. GLUCOSE, POC    Collection Time: 09/28/21  7:02 AM   Result Value Ref Range    Glucose (POC) 117 65 - 117 mg/dL    Performed by Sydney Strickland (PCT)    GLUCOSE, POC    Collection Time: 09/28/21 11:25 AM   Result Value Ref Range    Glucose (POC) 166 (H) 65 - 117 mg/dL    Performed by Mayra Camilo PCT        Microbiology Data: In HPI    Assessment:   78y.o. year old male with history of DM2, who presented to the ED on 9/26/2021 for worsening right hallux wound. Patient reports that an ulcer started on his toe after what he thinks could be from his about a year ago.   He had graft placement at the wound on June 30, 2021 as there was no concern for active infection at that time. Patient reports that the wound has intermittently had some drainage but does not reported to be thick or yellow. He denies any fevers or chills. I discussed case with Dr. Frederic Goldstein today and the patient had wound cultures taken on 8/16/2021 which grew Streptococcus pyogenes, Pseudomonas and E. coli. We do not have the reports of this available at this time. Patient was put on oral Augmentin about a week prior to the current admission; patient was still taking Augmentin just before getting admitted here. Imaging here has showed osteomyelitis of the right hallux at the proximal and distal phalanges and possible IP joint septic arthritis. He is status post debridement of the right toe on 9/26/2021. Pathology and operative cultures are pending so far. He has been on vancomycin and Zosyn since admission. Recommendations:  -We will review the wound cultures obtained by podiatry last month.  -We will await operative cultures, pathology. Will examine wound.    -Choice and duration of antibiotics, oral or IV is pending at this time. Will follow. Thank for the opportunity to participate in the care of this patient. Please contact with questions or concerns.            Pearl Lentz MD  Infectious Diseases

## 2021-09-28 NOTE — PROGRESS NOTES
Podiatry    Subjective: Patient relates 1 year Hx of wound plantar right hallux treated with wound care, ABX and recently a skin substitute graft by Dr. Pérez Brown as outpatient. The toe became red and swollen last week, and I saw him in the office since Dr. éPrez Brown was out of town. X-rays in the office revealed early destructive changes to the medial hallux phalanges around the IPJ and the wound probed to bone. We discussed amputation vs. debridement and IV ABX, but he could not decide at the time. He was given an Rx for Augmentin and told to keep his scheduled F/U next week. He noticed worsening of the erythema and edema despite the ABX, and presented to the ED. He is now POD #1 debridement of the wound and bone. Patient is a 78 y.o.  male who is being seen for osteomyelitis right hallux. Patient Active Problem List    Diagnosis Date Noted    Diabetic foot ([address] 2021    S/P TAVR (transcatheter aortic valve replacement) 2021    Aortic stenosis 2021    Severe aortic stenosis 2021    GIB (gastrointestinal bleeding) 2021     Past Medical History:   Diagnosis Date    Aortic regurgitation     Aortic stenosis     Atrial fibrillation (HCC)     Colon polyps     Diabetes mellitus, type 2 (HCC)     GI bleed     HTN (hypertension)       Past Surgical History:   Procedure Laterality Date    COLONOSCOPY N/A 3/16/2021    COLONOSCOPY performed by Júnior Chavez MD at [facility] ENDOSCOPY    HX AFIB ABLATION  2021      History reviewed. No pertinent family history.    Social History     Tobacco Use    Smoking status: Former Smoker     Types: Cigarettes     Quit date: 2000     Years since quittin.7    Smokeless tobacco: Never Used   Substance Use Topics    Alcohol use: Not on file     Current Facility-Administered Medications   Medication Dose Route Frequency Provider Last Rate Last Admin    vancomycin (VANCOCIN) 1,000 mg in 0.9% sodium chloride 250 mL (VIAL-MATE)  1,000 mg IntraVENous Q12H Nikole Mills MD        sodium chloride (NS) flush 5-40 mL  5-40 mL IntraVENous Q8H Belock, Henrine Sergeant, DPM   10 mL at 09/27/21 1820    sodium chloride (NS) flush 5-40 mL  5-40 mL IntraVENous PRN Belock, Henrine Sergeant, DPM        acetaminophen (TYLENOL) tablet 650 mg  650 mg Oral Q6H PRN Belock, Henrine Sergeant, DPM        Or    acetaminophen (TYLENOL) suppository 650 mg  650 mg Rectal Q6H PRN Belock, Henrine Sergeant, DPM        polyethylene glycol (MIRALAX) packet 17 g  17 g Oral DAILY PRN Belock, Henrine Sergeant, DPM        ondansetron (ZOFRAN ODT) tablet 4 mg  4 mg Oral Q8H PRN Belock, Henrine Sergeant, DPM        Or    ondansetron (ZOFRAN) injection 4 mg  4 mg IntraVENous Q6H PRN Belock, Henrine Sergeant, DPM        enoxaparin (LOVENOX) injection 40 mg  40 mg SubCUTAneous DAILY Belock, Henrine Sergeant, DPM   40 mg at 09/27/21 0953    insulin lispro (HUMALOG) injection   SubCUTAneous AC&HS Belpili, Jasenrine Sergeant, DPM   2 Units at 09/27/21 1819    glucose chewable tablet 16 g  4 Tablet Oral PRN Belock, Henrine Sergeant, DPM        dextrose (D50W) injection syrg 12.5-25 g  12.5-25 g IntraVENous PRN Belpili, Jasenrine Sergeant, DPM        glucagon (GLUCAGEN) injection 1 mg  1 mg IntraMUSCular PRN Belpili, Henrine Sergeant, DPM        pantoprazole (PROTONIX) tablet 40 mg  40 mg Oral ACB Belock, Henrine Sergeant, DPM   40 mg at 09/27/21 0530    rosuvastatin (CRESTOR) tablet 10 mg  10 mg Oral QHS Belock, Henrine Sergeant, DPM   10 mg at 09/26/21 2137    piperacillin-tazobactam (ZOSYN) 3.375 g in 0.9% sodium chloride (MBP/ADV) 100 mL MBP  3.375 g IntraVENous Q8H Belock, Henrine Sergeant, DPM 25 mL/hr at 09/27/21 1218 3.375 g at 09/27/21 1218    alcohol 62% (NOZIN) nasal  1 Ampule  1 Ampule Topical Q12H Sue Echeverria DPM   1 Ampule at 09/27/21 0953    oxyCODONE IR (ROXICODONE) tablet 5 mg  5 mg Oral Q6H PRN Sue Echeverria DPM        aspirin chewable tablet 81 mg  81 mg Oral DAILY Nikole Mills MD   81 mg at 09/27/21 0530    losartan (COZAAR) tablet 100 mg  100 mg Oral QPM Toribio Werner MD RINKU   100 mg at 21 1821      No Known Allergies     Review of Systems:  AO x4. NAD. No fever or chills. Objective:     Patient Vitals for the past 8 hrs:   BP Temp Pulse Resp SpO2   21 1930 (!) 149/72 98.5 °F (36.9 °C) 80 18 98 %   21 1522 (!) 140/68 98.3 °F (36.8 °C) 74 19 100 %   21 1224 (!) 149/74 97.4 °F (36.3 °C) 78 19 100 %     Temp (24hrs), Av.4 °F (36.9 °C), Min:97.4 °F (36.3 °C), Max:99.3 °F (37.4 °C)      Physical Exam Lower Extremities:    DP and PT 2/4 bilaterally; CFT less than 3 seconds. Normal hair growth. Erythema and edema right hallux is reduced. The wound plantar right hallux is partially closed with retention sutures. Sensation absent to fine touch right hallux  No POP right hallux    Lab Review:   Recent Results (from the past 24 hour(s))   GLUCOSE, POC    Collection Time: 21  9:06 PM   Result Value Ref Range    Glucose (POC) 161 (H) 65 - 117 mg/dL    Performed by Seb Tyson RN    CBC WITH AUTOMATED DIFF    Collection Time: 21  1:55 AM   Result Value Ref Range    WBC 3.7 (L) 4.1 - 11.1 K/uL    RBC 3.10 (L) 4.10 - 5.70 M/uL    HGB 8.7 (L) 12.1 - 17.0 g/dL    HCT 28.3 (L) 36.6 - 50.3 %    MCV 91.3 80.0 - 99.0 FL    MCH 28.1 26.0 - 34.0 PG    MCHC 30.7 30.0 - 36.5 g/dL    RDW 14.3 11.5 - 14.5 %    PLATELET 625 (L) 796 - 400 K/uL    MPV 9.9 8.9 - 12.9 FL    NRBC 0.0 0  WBC    ABSOLUTE NRBC 0.00 0.00 - 0.01 K/uL    NEUTROPHILS 70 32 - 75 %    LYMPHOCYTES 16 12 - 49 %    MONOCYTES 12 5 - 13 %    EOSINOPHILS 1 0 - 7 %    BASOPHILS 1 0 - 1 %    IMMATURE GRANULOCYTES 0 0.0 - 0.5 %    ABS. NEUTROPHILS 2.7 1.8 - 8.0 K/UL    ABS. LYMPHOCYTES 0.6 (L) 0.8 - 3.5 K/UL    ABS. MONOCYTES 0.4 0.0 - 1.0 K/UL    ABS. EOSINOPHILS 0.0 0.0 - 0.4 K/UL    ABS. BASOPHILS 0.0 0.0 - 0.1 K/UL    ABS. IMM.  GRANS. 0.0 0.00 - 0.04 K/UL    DF SMEAR SCANNED      RBC COMMENTS NORMOCYTIC, NORMOCHROMIC     METABOLIC PANEL, COMPREHENSIVE    Collection Time: 21  1:55 AM   Result Value Ref Range    Sodium 134 (L) 136 - 145 mmol/L    Potassium 3.9 3.5 - 5.1 mmol/L    Chloride 106 97 - 108 mmol/L    CO2 27 21 - 32 mmol/L    Anion gap 1 (L) 5 - 15 mmol/L    Glucose 163 (H) 65 - 100 mg/dL    BUN 21 (H) 6 - 20 MG/DL    Creatinine 1.10 0.70 - 1.30 MG/DL    BUN/Creatinine ratio 19 12 - 20      GFR est AA >60 >60 ml/min/1.73m2    GFR est non-AA >60 >60 ml/min/1.73m2    Calcium 8.7 8.5 - 10.1 MG/DL    Bilirubin, total 0.8 0.2 - 1.0 MG/DL    ALT (SGPT) 57 12 - 78 U/L    AST (SGOT) 82 (H) 15 - 37 U/L    Alk. phosphatase 138 (H) 45 - 117 U/L    Protein, total 6.7 6.4 - 8.2 g/dL    Albumin 2.5 (L) 3.5 - 5.0 g/dL    Globulin 4.2 (H) 2.0 - 4.0 g/dL    A-G Ratio 0.6 (L) 1.1 - 2.2     GLUCOSE, POC    Collection Time: 09/27/21  8:09 AM   Result Value Ref Range    Glucose (POC) 132 (H) 65 - 117 mg/dL    Performed by Javelin PCT    VANCOMYCIN, TROUGH    Collection Time: 09/27/21  9:55 AM   Result Value Ref Range    Vancomycin,trough 9.8 5.0 - 10.0 ug/mL    Reported dose date NOT PROVIDED      Reported dose time: NOT PROVIDED      Reported dose: NOT PROVIDED UNITS   GLUCOSE, POC    Collection Time: 09/27/21 11:41 AM   Result Value Ref Range    Glucose (POC) 140 (H) 65 - 117 mg/dL    Performed by Javelin PCT    GLUCOSE, POC    Collection Time: 09/27/21  3:55 PM   Result Value Ref Range    Glucose (POC) 167 (H) 65 - 117 mg/dL    Performed by Javelin PCT        Impression:   1. Cellulitis right hallux  2. Septic arthritis right hallux IPJ  3. Osteomyelitis right hallux proximal and distal phalanges    Recommendation:   Dressings changed. I spoke with the patient and his wife about the next steps. I will F/U tomorrow. Awaiting path and cultures.

## 2021-09-28 NOTE — PROGRESS NOTES
End of Shift Note    Bedside shift change report given to Maynor Negron RN (oncoming nurse) by Manuel Jamison RN (offgoing nurse). Report included the following information SBAR, Kardex, Intake/Output, MAR and Recent Results    Shift worked:  7p-7a     Shift summary and any significant changes:     Pt has not complained of pain. Dressing on right foot CDI. Pt experienced BP >180, MD notified, hydralazine ordered, BP < 170, not given. Concerns for physician to address:  none     Zone phone for oncoming shift:          Activity:  Activity Level: Up ad xu  Number times ambulated in hallways past shift: 0  Number of times OOB to chair past shift: 0    Cardiac:   Cardiac Monitoring: No      Cardiac Rhythm: Sinus Rhythm    Access:   Current line(s): PIV     Genitourinary:   Urinary status: voiding    Respiratory:   O2 Device: None (Room air)  Chronic home O2 use?: NO  Incentive spirometer at bedside: YES     GI:  Last Bowel Movement Date: 09/25/21  Current diet:  ADULT DIET Regular; 4 carb choices (60 gm/meal)  Passing flatus: YES  Tolerating current diet: YES       Pain Management:   Patient states pain is manageable on current regimen: YES    Skin:  Lux Score: 21  Interventions: increase time out of bed    Patient Safety:  Fall Score:  Total Score: 1  Interventions: gripper socks, pt to call before getting OOB and stay with me (per policy)       Length of Stay:  Expected LOS: 2d 21h  Actual LOS: 2      Manuel Jamison RN

## 2021-09-29 LAB
ANION GAP SERPL CALC-SCNC: 6 MMOL/L (ref 5–15)
BACTERIA SPEC CULT: ABNORMAL
BUN SERPL-MCNC: 14 MG/DL (ref 6–20)
BUN/CREAT SERPL: 14 (ref 12–20)
CALCIUM SERPL-MCNC: 9.7 MG/DL (ref 8.5–10.1)
CHLORIDE SERPL-SCNC: 109 MMOL/L (ref 97–108)
CO2 SERPL-SCNC: 22 MMOL/L (ref 21–32)
CREAT SERPL-MCNC: 1 MG/DL (ref 0.7–1.3)
DATE LAST DOSE: ABNORMAL
GLUCOSE BLD STRIP.AUTO-MCNC: 111 MG/DL (ref 65–117)
GLUCOSE BLD STRIP.AUTO-MCNC: 125 MG/DL (ref 65–117)
GLUCOSE BLD STRIP.AUTO-MCNC: 126 MG/DL (ref 65–117)
GLUCOSE BLD STRIP.AUTO-MCNC: 153 MG/DL (ref 65–117)
GLUCOSE SERPL-MCNC: 109 MG/DL (ref 65–100)
POTASSIUM SERPL-SCNC: 3.8 MMOL/L (ref 3.5–5.1)
REPORTED DOSE,DOSE: ABNORMAL UNITS
REPORTED DOSE/TIME,TMG: ABNORMAL
SERVICE CMNT-IMP: ABNORMAL
SERVICE CMNT-IMP: NORMAL
SODIUM SERPL-SCNC: 137 MMOL/L (ref 136–145)
VANCOMYCIN TROUGH SERPL-MCNC: 12.8 UG/ML (ref 5–10)

## 2021-09-29 PROCEDURE — 80048 BASIC METABOLIC PNL TOTAL CA: CPT

## 2021-09-29 PROCEDURE — 74011250637 HC RX REV CODE- 250/637: Performed by: PODIATRIST

## 2021-09-29 PROCEDURE — 74011250636 HC RX REV CODE- 250/636: Performed by: STUDENT IN AN ORGANIZED HEALTH CARE EDUCATION/TRAINING PROGRAM

## 2021-09-29 PROCEDURE — 74011636637 HC RX REV CODE- 636/637: Performed by: PODIATRIST

## 2021-09-29 PROCEDURE — 74011250637 HC RX REV CODE- 250/637: Performed by: INTERNAL MEDICINE

## 2021-09-29 PROCEDURE — 82962 GLUCOSE BLOOD TEST: CPT

## 2021-09-29 PROCEDURE — 74011000258 HC RX REV CODE- 258: Performed by: STUDENT IN AN ORGANIZED HEALTH CARE EDUCATION/TRAINING PROGRAM

## 2021-09-29 PROCEDURE — 80202 ASSAY OF VANCOMYCIN: CPT

## 2021-09-29 PROCEDURE — 36415 COLL VENOUS BLD VENIPUNCTURE: CPT

## 2021-09-29 PROCEDURE — 74011250636 HC RX REV CODE- 250/636: Performed by: INTERNAL MEDICINE

## 2021-09-29 PROCEDURE — 74011250636 HC RX REV CODE- 250/636: Performed by: PODIATRIST

## 2021-09-29 PROCEDURE — 2709999900 HC NON-CHARGEABLE SUPPLY

## 2021-09-29 PROCEDURE — 74011000258 HC RX REV CODE- 258: Performed by: PODIATRIST

## 2021-09-29 PROCEDURE — 99233 SBSQ HOSP IP/OBS HIGH 50: CPT | Performed by: STUDENT IN AN ORGANIZED HEALTH CARE EDUCATION/TRAINING PROGRAM

## 2021-09-29 PROCEDURE — 65660000000 HC RM CCU STEPDOWN

## 2021-09-29 RX ORDER — METRONIDAZOLE 500 MG/100ML
500 INJECTION, SOLUTION INTRAVENOUS EVERY 8 HOURS
Status: DISCONTINUED | OUTPATIENT
Start: 2021-09-29 | End: 2021-10-01 | Stop reason: HOSPADM

## 2021-09-29 RX ADMIN — PANTOPRAZOLE SODIUM 40 MG: 40 TABLET, DELAYED RELEASE ORAL at 06:39

## 2021-09-29 RX ADMIN — Medication 100 MG: at 08:14

## 2021-09-29 RX ADMIN — VANCOMYCIN HYDROCHLORIDE 1000 MG: 1 INJECTION, POWDER, LYOPHILIZED, FOR SOLUTION INTRAVENOUS at 13:02

## 2021-09-29 RX ADMIN — HYDRALAZINE HYDROCHLORIDE 20 MG: 20 INJECTION INTRAMUSCULAR; INTRAVENOUS at 22:56

## 2021-09-29 RX ADMIN — Medication 1 AMPULE: at 22:56

## 2021-09-29 RX ADMIN — CEFEPIME HYDROCHLORIDE 2 G: 2 INJECTION, POWDER, FOR SOLUTION INTRAVENOUS at 18:34

## 2021-09-29 RX ADMIN — PIPERACILLIN AND TAZOBACTAM 3.38 G: 3; .375 INJECTION, POWDER, LYOPHILIZED, FOR SOLUTION INTRAVENOUS at 13:02

## 2021-09-29 RX ADMIN — METRONIDAZOLE 500 MG: 500 INJECTION, SOLUTION INTRAVENOUS at 17:36

## 2021-09-29 RX ADMIN — LOSARTAN POTASSIUM 100 MG: 100 TABLET, FILM COATED ORAL at 17:37

## 2021-09-29 RX ADMIN — Medication 1 AMPULE: at 08:17

## 2021-09-29 RX ADMIN — ENOXAPARIN SODIUM 40 MG: 40 INJECTION SUBCUTANEOUS at 08:14

## 2021-09-29 RX ADMIN — ACETAMINOPHEN 650 MG: 325 TABLET ORAL at 23:02

## 2021-09-29 RX ADMIN — PIPERACILLIN AND TAZOBACTAM 3.38 G: 3; .375 INJECTION, POWDER, LYOPHILIZED, FOR SOLUTION INTRAVENOUS at 04:59

## 2021-09-29 RX ADMIN — ROSUVASTATIN CALCIUM 10 MG: 10 TABLET, COATED ORAL at 22:56

## 2021-09-29 RX ADMIN — Medication 10 ML: at 22:56

## 2021-09-29 RX ADMIN — ASPIRIN 81 MG: 81 TABLET, CHEWABLE ORAL at 08:14

## 2021-09-29 RX ADMIN — METRONIDAZOLE 500 MG: 500 INJECTION, SOLUTION INTRAVENOUS at 23:02

## 2021-09-29 RX ADMIN — Medication 10 ML: at 13:03

## 2021-09-29 RX ADMIN — VANCOMYCIN HYDROCHLORIDE 1000 MG: 1 INJECTION, POWDER, LYOPHILIZED, FOR SOLUTION INTRAVENOUS at 02:09

## 2021-09-29 RX ADMIN — INSULIN LISPRO 2 UNITS: 100 INJECTION, SOLUTION INTRAVENOUS; SUBCUTANEOUS at 13:02

## 2021-09-29 RX ADMIN — AMLODIPINE BESYLATE 2.5 MG: 5 TABLET ORAL at 08:14

## 2021-09-29 NOTE — PROGRESS NOTES
End of Shift Note    Bedside shift change report given to [facility], [facility] (oncoming nurse) by Jessy Harris RN (offgoing nurse). Report included the following information SBAR, Kardex, Intake/Output, MAR and Recent Results    Shift worked:  Day     Shift summary and any significant changes:    Patient has had stable vital signs and has tolerated meals/medications. Concerns for physician to address:  n/a     Zone phone for oncoming shift:  n/a     Activity:  Activity Level: Up ad xu  Number times ambulated in hallways past shift: 0  Number of times OOB to chair past shift: 0    Cardiac:   Cardiac Monitoring: No      Cardiac Rhythm: Sinus Rhythm    Access:   Current line(s): PIV     Genitourinary:   Urinary status: voiding    Respiratory:   O2 Device: None (Room air)  Chronic home O2 use?: NO  Incentive spirometer at bedside: YES     GI:  Last Bowel Movement Date: 09/29/21  Current diet:  ADULT DIET Regular; 4 carb choices (60 gm/meal)  Passing flatus: YES  Tolerating current diet: YES       Pain Management:   Patient states pain is manageable on current regimen: YES    Skin:  Lux Score: 21  Interventions: float heels and increase time out of bed    Patient Safety:  Fall Score:  Total Score: 1  Interventions: gripper socks and pt to call before getting OOB       Length of Stay:  Expected LOS: 2d 21h  Actual LOS: Jeff Phoenix RN

## 2021-09-29 NOTE — PROGRESS NOTES
End of Shift Note    Bedside shift change report given to Elizabet Johansen RN (oncoming nurse) by Sav Quach RN (offgoing nurse). Report included the following information SBAR, Kardex and Recent Results    Shift worked:  7p-7a     Shift summary and any significant changes:     Uneventful shift. Pt had no complaints. Concerns for physician to address:  none     Zone phone for oncoming shift:          Activity:  Activity Level: Up ad xu  Number times ambulated in hallways past shift: 0  Number of times OOB to chair past shift: 0    Cardiac:   Cardiac Monitoring: No      Cardiac Rhythm: Sinus Rhythm    Access:   Current line(s): PIV     Genitourinary:   Urinary status: voiding    Respiratory:   O2 Device: None (Room air)  Chronic home O2 use?: NO  Incentive spirometer at bedside: NO     GI:  Last Bowel Movement Date: 09/28/21  Current diet:  ADULT DIET Regular; 4 carb choices (60 gm/meal)  Passing flatus: YES  Tolerating current diet: YES       Pain Management:   Patient states pain is manageable on current regimen: YES    Skin:  Lux Score: 22  Interventions: increase time out of bed and limit briefs    Patient Safety:  Fall Score:  Total Score: 1  Interventions: gripper socks       Length of Stay:  Expected LOS: 2d 21h  Actual LOS: [id number] Paulding Carlos, RN

## 2021-09-29 NOTE — PROGRESS NOTES
Infectious Disease Progress Note         Interval:  No acute events overnight    Subjective:   Patient seen this morning. He reports to be feeling well. He had no pain at the right toe to begin with and currently endorses no pain as well. Objective:    Vitals:   Reviewed in chart. Physical Exam:  ? GEN: NAD  ? HEENT: Normocephalic, atraumatic, PERRL, no scleral icterus  ? CV: Hemodynamically stable  ? Lungs: Clear to auscultation bilaterally  ? Abdomen: soft, non distended, non tender,  ? Genitourinary:  no ward  ? Extremities: no edema  ? Neuro: Alert, oriented to time, place and situation, moves all extremities to commands, verbal   ? Skin: Right foot hallux seen today. Does not appear acutely inflamed. He has no tenderness. The surgical area is dry and there is no drainage seen. There is no tenderness on manipulation of the PIP or the DIP of the hallux. ?  Psych: good affect, good eye contact, non tearful   Lines: Peripheral IV lines        Labs:  Recent Results (from the past 24 hour(s))   GLUCOSE, POC    Collection Time: 09/28/21  3:42 PM   Result Value Ref Range    Glucose (POC) 141 (H) 65 - 117 mg/dL    Performed by Camila Cortez    GLUCOSE, POC    Collection Time: 09/28/21  9:30 PM   Result Value Ref Range    Glucose (POC) 144 (H) 65 - 117 mg/dL    Performed by Alexa Presley (PCT)    METABOLIC PANEL, BASIC    Collection Time: 09/29/21  4:06 AM   Result Value Ref Range    Sodium 137 136 - 145 mmol/L    Potassium 3.8 3.5 - 5.1 mmol/L    Chloride 109 (H) 97 - 108 mmol/L    CO2 22 21 - 32 mmol/L    Anion gap 6 5 - 15 mmol/L    Glucose 109 (H) 65 - 100 mg/dL    BUN 14 6 - 20 MG/DL    Creatinine 1.00 0.70 - 1.30 MG/DL    BUN/Creatinine ratio 14 12 - 20      GFR est AA >60 >60 ml/min/1.73m2    GFR est non-AA >60 >60 ml/min/1.73m2    Calcium 9.7 8.5 - 10.1 MG/DL   GLUCOSE, POC    Collection Time: 09/29/21  6:52 AM   Result Value Ref Range    Glucose (POC) 111 65 - 117 mg/dL    Performed by Arline Porter (PCT)    GLUCOSE, POC    Collection Time: 09/29/21 11:47 AM   Result Value Ref Range    Glucose (POC) 153 (H) 65 - 117 mg/dL    Performed by Demetri Garcia PCT    VANCOMYCIN, TROUGH    Collection Time: 09/29/21  1:13 PM   Result Value Ref Range    Vancomycin,trough 12.8 (H) 5.0 - 10.0 ug/mL    Reported dose date NOT PROVIDED      Reported dose time: NOT PROVIDED      Reported dose: NOT PROVIDED UNITS           Assessment:  78y.o. year old male with history of DM2, who presented to the ED on 9/26/2021 for worsening right hallux wound. Patient reports that an ulcer started on his toe after what he thinks could be from his about a year ago. He had graft placement at the wound on June 30, 2021 as there was no concern for active infection at that time. Patient reports that the wound has intermittently had some drainage but does not reported to be thick or yellow. He denies any fevers or chills. I discussed case with Dr. Edwina Miller today and the patient had wound cultures taken on 9/16/2021 which grew Streptococcus pyogenes, Pseudomonas and Enterobacter cloacae. We do not have the reports of this available at this time. Patient was put on oral Augmentin about a week prior to the current admission; patient was still taking Augmentin just before getting admitted here. Imaging here has showed osteomyelitis of the right hallux at the proximal and distal phalanges and possible IP joint septic arthritis. He is status post debridement of the right toe on 9/26/2021. Pathology and operative cultures are pending so far. He has been on vancomycin and Zosyn since admission. Recommendations:  -Right hallux seen today. Operative cultures so far have no growth. Again patient was on Augmentin prior to arrival.  Pathology is showing acute osteomyelitis expectedly. I will recommend 6 weeks of IV antibiotics given the osteomyelitis and the possible septic arthritis at the IP joint.   Patient had several questions regarding efficacy of IV antibiotics versus amputation at this time. I explained to him in detail that given that his toe does not appear extremely acute inflamed at this time, IV antibiotics are not an unreasonable option going forward for now. I have explained to him that the infection might still come back later even if IV antibiotics help for now however there is no way to predict that 100% though I do think we should give IV antibiotics a chance for now. I have discussed this with Dr. Jose Juan Corcoran as well. I explained to the patient that amputation of the toe always remains an option if the IV antibiotics are not helpful and/or the infection comes back at a later time. Patient said he will discuss with the podiatrist again this evening.  -I have reviewed the drainage cultures from the wound that were taken on 9/16/2021 and they grew Streptococcus pyogenes, Pseudomonas aeruginosa and Enterobacter cloacae. Hence, we will switch antibiotics to cefepime and Flagyl. Might be adding daptomycin as well. Stop Zosyn and vancomycin today.  -Whether or not patient prefers to undergo amputation during this hospital course is a discussion between the patient and podiatry. We will follow. Thank you for the opportunity to participate in the care of this patient. Please contact with questions or concerns.       Saul Cui MD  Infectious Diseases

## 2021-09-29 NOTE — PROGRESS NOTES
Problem: Falls - Risk of  Goal: *Absence of Falls  Description: Document Jose Freeze Fall Risk and appropriate interventions in the flowsheet. Outcome: Progressing Towards Goal  Note: Fall Risk Interventions:            Medication Interventions: Patient to call before getting OOB, Teach patient to arise slowly         History of Falls Interventions: Consult care management for discharge planning         Problem: Patient Education: Go to Patient Education Activity  Goal: Patient/Family Education  Outcome: Progressing Towards Goal     Problem: Diabetes Self-Management  Goal: *Disease process and treatment process  Description: Define diabetes and identify own type of diabetes; list 3 options for treating diabetes. Outcome: Progressing Towards Goal  Goal: *Incorporating nutritional management into lifestyle  Description: Describe effect of type, amount and timing of food on blood glucose; list 3 methods for planning meals. Outcome: Progressing Towards Goal  Goal: *Incorporating physical activity into lifestyle  Description: State effect of exercise on blood glucose levels. Outcome: Progressing Towards Goal  Goal: *Developing strategies to promote health/change behavior  Description: Define the ABC's of diabetes; identify appropriate screenings, schedule and personal plan for screenings. Outcome: Progressing Towards Goal  Goal: *Using medications safely  Description: State effect of diabetes medications on diabetes; name diabetes medication taking, action and side effects. Outcome: Progressing Towards Goal  Goal: *Monitoring blood glucose, interpreting and using results  Description: Identify recommended blood glucose targets  and personal targets. Outcome: Progressing Towards Goal  Goal: *Prevention, detection, treatment of acute complications  Description: List symptoms of hyper- and hypoglycemia; describe how to treat low blood sugar and actions for lowering  high blood glucose level.   Outcome: Progressing Towards Goal  Goal: *Prevention, detection and treatment of chronic complications  Description: Define the natural course of diabetes and describe the relationship of blood glucose levels to long term complications of diabetes.   Outcome: Progressing Towards Goal  Goal: *Developing strategies to address psychosocial issues  Description: Describe feelings about living with diabetes; identify support needed and support network  Outcome: Progressing Towards Goal  Goal: *Insulin pump training  Outcome: Progressing Towards Goal  Goal: *Sick day guidelines  Outcome: Progressing Towards Goal  Goal: *Patient Specific Goal (EDIT GOAL, INSERT TEXT)  Outcome: Progressing Towards Goal     Problem: Patient Education: Go to Patient Education Activity  Goal: Patient/Family Education  Outcome: Progressing Towards Goal

## 2021-09-29 NOTE — PROGRESS NOTES
Podiatry    Subjective: Patient relates 1 year Hx of wound plantar right hallux treated with wound care, ABX and recently a skin substitute graft by Dr. Toño Casanova as outpatient. The toe became red and swollen last week, and I saw him in the office since Dr. Toño Casanova was out of town. X-rays in the office revealed early destructive changes to the medial hallux phalanges around the IPJ and the wound probed to bone. We discussed amputation vs. debridement and IV ABX, but he could not decide at the time. He was given an Rx for Augmentin and told to keep his scheduled F/U next week. He noticed worsening of the erythema and edema despite the ABX, and presented to the ED. He is now POD #2 debridement of the wound and bone. Patient is a 78 y.o.  male who is being seen for osteomyelitis right hallux. Patient Active Problem List    Diagnosis Date Noted    Diabetic foot ([address] 2021    S/P TAVR (transcatheter aortic valve replacement) 2021    Aortic stenosis 2021    Severe aortic stenosis 2021    GIB (gastrointestinal bleeding) 2021     Past Medical History:   Diagnosis Date    Aortic regurgitation     Aortic stenosis     Atrial fibrillation (HCC)     Colon polyps     Diabetes mellitus, type 2 (HCC)     GI bleed     HTN (hypertension)       Past Surgical History:   Procedure Laterality Date    COLONOSCOPY N/A 3/16/2021    COLONOSCOPY performed by Regina Rao MD at [facility] ENDOSCOPY    HX AFIB ABLATION  2021      History reviewed. No pertinent family history. Social History     Tobacco Use    Smoking status: Former Smoker     Types: Cigarettes     Quit date: 2000     Years since quittin.7    Smokeless tobacco: Never Used   Substance Use Topics    Alcohol use: Not on file     Current Facility-Administered Medications   Medication Dose Route Frequency Provider Last Rate Last Admin    [START ON 2021] Vancomycin - please draw trough  prior to 1000 dose. Thank you!   1 Each Other ONCE Vinny Galeana MD        hydrALAZINE (APRESOLINE) 20 mg/mL injection 20 mg  20 mg IntraVENous Q6H PRN Elbert Urban MD   20 mg at 09/28/21 1719    [START ON 9/29/2021] amLODIPine (NORVASC) tablet 2.5 mg  2.5 mg Oral DAILY Vinny Galeana MD        vancomycin (VANCOCIN) 1,000 mg in 0.9% sodium chloride 250 mL (VIAL-MATE)  1,000 mg IntraVENous Q12H Elbert Urban  mL/hr at 09/28/21 1035 1,000 mg at 09/28/21 1035    sodium chloride (NS) flush 5-40 mL  5-40 mL IntraVENous Q8H Belock, Maria Antonia Reins, DPM   10 mL at 09/28/21 1358    sodium chloride (NS) flush 5-40 mL  5-40 mL IntraVENous PRN Belock, Maria Antonia Reins, DPM        acetaminophen (TYLENOL) tablet 650 mg  650 mg Oral Q6H PRN Belock, Maria Antonia Reins, DPM        Or    acetaminophen (TYLENOL) suppository 650 mg  650 mg Rectal Q6H PRN Belock, Maria Antonia Reins, DPM        polyethylene glycol (MIRALAX) packet 17 g  17 g Oral DAILY PRN Belock, Maria Antonia Reins, DPM        ondansetron (ZOFRAN ODT) tablet 4 mg  4 mg Oral Q8H PRN Belock, Maria Antonia Reins, DPM        Or    ondansetron (ZOFRAN) injection 4 mg  4 mg IntraVENous Q6H PRN Belock, Maria Antonia Reins, DPM        enoxaparin (LOVENOX) injection 40 mg  40 mg SubCUTAneous DAILY Belock, Maria Antonia Reins, DPM   40 mg at 09/28/21 0816    insulin lispro (HUMALOG) injection   SubCUTAneous AC&HS Belock, Maria Antonia Reins, DPM   2 Units at 09/28/21 1551    glucose chewable tablet 16 g  4 Tablet Oral PRN Belock, Maria Antonia Reins, DPM        dextrose (D50W) injection syrg 12.5-25 g  12.5-25 g IntraVENous PRN Belock, Maria Antonia Reins, DPM        glucagon (GLUCAGEN) injection 1 mg  1 mg IntraMUSCular PRN Belock, Maria Antonia Reins, DPM        pantoprazole (PROTONIX) tablet 40 mg  40 mg Oral ACB Maria Antonia Echeverria DPM   40 mg at 09/28/21 0554    rosuvastatin (CRESTOR) tablet 10 mg  10 mg Oral QHS Maria Antonia Echeverria DPM   10 mg at 09/27/21 9742    piperacillin-tazobactam (ZOSYN) 3.375 g in 0.9% sodium chloride (MBP/ADV) 100 mL MBP  3.375 g IntraVENous Q8H Maria Antonia Echeverria, DPM 25 mL/hr at 21 1358 3.375 g at 21 1358    alcohol 62% (NOZIN) nasal  1 Ampule  1 Ampule Topical Q12H Evert Echeverria DPM   1 Ampule at 21 0819    oxyCODONE IR (ROXICODONE) tablet 5 mg  5 mg Oral Q6H PRN Evert Echeverria, DPM        aspirin chewable tablet 81 mg  81 mg Oral DAILY Trace Felton MD   81 mg at 21 0816    losartan (COZAAR) tablet 100 mg  100 mg Oral QPM Trace Felton MD   100 mg at 21 1713      No Known Allergies     Review of Systems:  AO x4. NAD. No fever or chills. Objective:     Patient Vitals for the past 8 hrs:   BP Temp Pulse Resp SpO2   21 203 (!) 150/73 98.6 °F (37 °C) 95 18 99 %   21 1811 (!) 127/56       21 1715 (!) 180/77  81     21 1456 (!) 173/71 98.6 °F (37 °C) 97 18 100 %     Temp (24hrs), Av.3 °F (36.8 °C), Min:97.7 °F (36.5 °C), Max:98.6 °F (37 °C)      Physical Exam Lower Extremities:    DP and PT 2/4 bilaterally; CFT less than 3 seconds. Normal hair growth. Erythema and edema right hallux is reduced. The wound plantar right hallux is partially closed with retention sutures. Sensation absent to fine touch right hallux  No POP right hallux    Pathology: +Acute osteomyelitis  C&S: Preliminary, no growth    Lab Review:   Recent Results (from the past 24 hour(s))   CBC WITH AUTOMATED DIFF    Collection Time: 21  3:31 AM   Result Value Ref Range    WBC 3.2 (L) 4.1 - 11.1 K/uL    RBC 3.33 (L) 4.10 - 5.70 M/uL    HGB 9.5 (L) 12.1 - 17.0 g/dL    HCT 30.0 (L) 36.6 - 50.3 %    MCV 90.1 80.0 - 99.0 FL    MCH 28.5 26.0 - 34.0 PG    MCHC 31.7 30.0 - 36.5 g/dL    RDW 14.2 11.5 - 14.5 %    PLATELET 418 (L) 838 - 400 K/uL    MPV 9.4 8.9 - 12.9 FL    NRBC 0.0 0  WBC    ABSOLUTE NRBC 0.00 0.00 - 0.01 K/uL    NEUTROPHILS 69 32 - 75 %    LYMPHOCYTES 18 12 - 49 %    MONOCYTES 11 5 - 13 %    EOSINOPHILS 2 0 - 7 %    BASOPHILS 0 0 - 1 %    IMMATURE GRANULOCYTES 0 0.0 - 0.5 %    ABS.  NEUTROPHILS 2. 2 1.8 - 8.0 K/UL    ABS. LYMPHOCYTES 0.6 (L) 0.8 - 3.5 K/UL    ABS. MONOCYTES 0.4 0.0 - 1.0 K/UL    ABS. EOSINOPHILS 0.1 0.0 - 0.4 K/UL    ABS. BASOPHILS 0.0 0.0 - 0.1 K/UL    ABS. IMM. GRANS. 0.0 0.00 - 0.04 K/UL    DF AUTOMATED     METABOLIC PANEL, COMPREHENSIVE    Collection Time: 09/28/21  3:31 AM   Result Value Ref Range    Sodium 136 136 - 145 mmol/L    Potassium 3.9 3.5 - 5.1 mmol/L    Chloride 107 97 - 108 mmol/L    CO2 24 21 - 32 mmol/L    Anion gap 5 5 - 15 mmol/L    Glucose 110 (H) 65 - 100 mg/dL    BUN 16 6 - 20 MG/DL    Creatinine 0.91 0.70 - 1.30 MG/DL    BUN/Creatinine ratio 18 12 - 20      GFR est AA >60 >60 ml/min/1.73m2    GFR est non-AA >60 >60 ml/min/1.73m2    Calcium 8.8 8.5 - 10.1 MG/DL    Bilirubin, total 1.2 (H) 0.2 - 1.0 MG/DL    ALT (SGPT) 58 12 - 78 U/L    AST (SGOT) 77 (H) 15 - 37 U/L    Alk. phosphatase 153 (H) 45 - 117 U/L    Protein, total 7.2 6.4 - 8.2 g/dL    Albumin 2.5 (L) 3.5 - 5.0 g/dL    Globulin 4.7 (H) 2.0 - 4.0 g/dL    A-G Ratio 0.5 (L) 1.1 - 2.2     HEMOGLOBIN A1C WITH EAG    Collection Time: 09/28/21  3:31 AM   Result Value Ref Range    Hemoglobin A1c 6.5 (H) 4.0 - 5.6 %    Est. average glucose 140 mg/dL   SAMPLES BEING HELD    Collection Time: 09/28/21  3:31 AM   Result Value Ref Range    SAMPLES BEING HELD  PST     COMMENT        Add-on orders for these samples will be processed based on acceptable specimen integrity and analyte stability, which may vary by analyte. GLUCOSE, POC    Collection Time: 09/28/21  7:02 AM   Result Value Ref Range    Glucose (POC) 117 65 - 117 mg/dL    Performed by Kenyon Callander (PCT)    GLUCOSE, POC    Collection Time: 09/28/21 11:25 AM   Result Value Ref Range    Glucose (POC) 166 (H) 65 - 117 mg/dL    Performed by Lyssa Junior [facility]    GLUCOSE, POC    Collection Time: 09/28/21  3:42 PM   Result Value Ref Range    Glucose (POC) 141 (H) 65 - 117 mg/dL    Performed by Abby Aden        Impression:   1.  Cellulitis right hallux  2. Septic arthritis right hallux IPJ  3. Osteomyelitis right hallux proximal and distal phalanges    Recommendation:   Dressings changed. I spoke with the patient and his wife about the next steps. I will F/U tomorrow. Awaiting path and cultures.

## 2021-09-29 NOTE — PROGRESS NOTES
Hospitalist Progress Note    NAME: Yesica Iglesias   :  1945   MRN:  [id number]     Admit date: 2021    Today's date: 21    PCP: Claudia Block MD    Anticipated discharge date:     Barriers:  Pending final decision re further surgery and final antibiotic recs    Assessment / Plan:    Right toe diabetic foot infection with osteomyelitis POA  MRI right foot  at OSH      Extensive soft tissue swelling involving the great toe. Bone marrow edema involving most of the proximal/distal phalanges of great toe              Concerning for osteomyelitis. No drainable soft tissue abscess is identified. Taking augmentin at admit  IV Zosyn and vancomycin  Blood cultures no growth so far  OR  with Dr Alex Orourke   Debridement of wound RIGHT GREAT TOE including BONE   Wound cultures pending   Possible anaerobic growth   Was on augmentin prior to admit, may affect cultures  Pathology Bone right great toe, biopsy:         Acute osteomyelitis. Awaiting decision re further OR, ? Needs amputation vs prolonged ANTIBIOTICS  Dr Ishaan Smith following for final antibiotic recs  Discharge once final treatment plan in place  Spoke with wife    DM type II POA  Hold p.o. home Metformin  Sliding scale insulin  , 116, 161, 132, 140      Severe aortic Stenosis severe with moderate AI POA  s/p TAVR 3/24/21 w/ Dr. Ryan Gallegos and Dr. Gena Sylvester.      Chronic anemia POA  Current  hemoglobin is 10.3     Hyperlipidemia POA  Atrial fibrillation s/p Ablation in 2021  Essential HTN POA  Prior  Xarelto but was discontinued due to GIB.    Off Amiodarone per patient  Continue losartan, BP still uncontrolled, ask Novasc AM   PRN hydralazine  Continue Statin    Alcohol use  1 bottle wine per night, last drink 4 days ago  History of alcohol withdrawal  Po thiamine   CIWA scale, currently no signs of withdrawal    Overweight POA Body mass index is 26.16 kg/m².     Code Status:full     Surrogate Decision Maker: spouse     DVT Prophylaxis: Lovenox   GI Prophylaxis: not indicated     Baseline: independent lives with family    Subjective:     Chief Complaint / Reason for Physician Visit f/u right diabetic foot infection/osteomyelitis  \"no problems\". Discussed with RN events overnight. No pain in foot  No complaints  BP running a bit high    Review of Systems:  Symptom Y/N Comments  Symptom Y/N Comments   Fever/Chills n   Chest Pain n    Poor Appetite    Edema     Cough n   Abdominal Pain n    Sputum    Joint Pain     SOB/MANCIA n   Headache     Nausea/vomit n   Tolerating PT/OT     Diarrhea n   Tolerating Diet y    Constipation    Other       Could NOT obtain due to:      Objective:     VITALS:   Last 24hrs VS reviewed since prior progress note. Most recent are:  Patient Vitals for the past 24 hrs:   Temp Pulse Resp BP SpO2   09/28/21 1811    (!) 127/56    09/28/21 1715  81  (!) 180/77    09/28/21 1456 98.6 °F (37 °C) 97 18 (!) 173/71 100 %   09/28/21 1301    (!) 175/85    09/28/21 1114 98.2 °F (36.8 °C) 80 18 (!) 184/77 100 %   09/28/21 0735 98.1 °F (36.7 °C) 73 18 (!) 174/80 100 %   09/28/21 0528  75  (!) 167/82    09/28/21 0328    (!) 187/79    09/28/21 0234 97.7 °F (36.5 °C) 73 18 (!) 199/105 99 %   09/28/21 0018 98.3 °F (36.8 °C) 69 18 (!) 163/75 100 %       Intake/Output Summary (Last 24 hours) at 9/28/2021 2001  Last data filed at 9/28/2021 1714  Gross per 24 hour   Intake 1370 ml   Output 1750 ml   Net -380 ml        Wt Readings from Last 12 Encounters:   09/26/21 87.5 kg (192 lb 14.4 oz)   03/25/21 79.4 kg (175 lb)   03/22/21 82.6 kg (182 lb 1.6 oz)   03/16/21 80.2 kg (176 lb 11.2 oz)   03/09/21 80.7 kg (178 lb)       PHYSICAL EXAM:    I had a face to face encounter and independently examined this patient on 09/28/21 as outlined below:    General: WD, WN. Alert, cooperative, no acute distress    EENT:  PERRL. Anicteric sclerae. MMM  Resp:  CTA bilaterally, no wheezing or rales.   No accessory muscle use  CV:  Regular  rhythm,  No edema  GI:  Soft, Non distended, Non tender. +Bowel sounds, no rebound  MS:  Right foot bandaged  Neurologic:  Alert and oriented X 3, normal speech, non focal motor exam  Psych:   Good insight. Not anxious nor agitated  Skin:  No rashes. No jaundice    Reviewed most current lab test results and cultures  YES  Reviewed most current radiology test results   YES  Review and summation of old records today    NO  Reviewed patient's current orders and MAR    YES  PMH/SH reviewed - no change compared to H&P  ________________________________________________________________________  Care Plan discussed with:    Comments   Patient y    Family  y Wife   RN y    Care Manager     Consultant                        Multidiciplinary team rounds were held today with , nursing, pharmacist and clinical coordinator. Patient's plan of care was discussed; medications were reviewed and discharge planning was addressed. ________________________________________________________________________      Comments   >50% of visit spent in counseling and coordination of care     ________________________________________________________________________  Loli Hendrix MD     Procedures: see electronic medical records for all procedures/Xrays and details which were not copied into this note but were reviewed prior to creation of Plan. LABS:  I reviewed today's most current labs and imaging studies.   Pertinent labs include:  Recent Labs     09/28/21 0331 09/27/21  0155   WBC 3.2* 3.7*   HGB 9.5* 8.7*   HCT 30.0* 28.3*   * 107*     Recent Labs     09/28/21 0331 09/27/21  0155    134*   K 3.9 3.9    106   CO2 24 27   * 163*   BUN 16 21*   CREA 0.91 1.10   CA 8.8 8.7   ALB 2.5* 2.5*   TBILI 1.2* 0.8   ALT 58 57

## 2021-09-29 NOTE — PROGRESS NOTES
Hospitalist Progress Note    NAME: Ramon Glover   :  1945   MRN:  [id number]     Admit date: 2021    Today's date: 21    PCP: Anthony Jones MD    Anticipated discharge date:     Barriers:  Pending final decision re further surgery and final antibiotic recs    Assessment / Plan:    Right toe diabetic foot infection with osteomyelitis POA  MRI right foot  at OSH      Extensive soft tissue swelling involving the great toe. Bone marrow edema involving most of the proximal/distal phalanges of great toe              Concerning for osteomyelitis. No drainable soft tissue abscess is identified. Was taking augmentin at the time of admission  IV Zosyn and vancomycin for now  Blood cultures no growth so far  OR  with Dr Tracey Bustillo   Debridement of wound RIGHT GREAT TOE including BONE   Wound cultures pending   Possible anaerobic growth   Was on augmentin prior to admit, may affect cultures  Pathology Bone right great toe, biopsy:         Acute osteomyelitis. Awaiting decision re further OR, ? Needs amputation vs prolonged ANTIBIOTICS  Dr Phyllis Martins following for final antibiotic recs  Discharge once final treatment plan in place  Spoke with wife    DM type II POA  Hold p.o. home Metformin  Sliding scale insulin  , 116, 161, 132, 140      Severe aortic Stenosis severe with moderate AI POA  s/p TAVR 3/24/21 w/ Dr. Susan Pacheco and Dr. Nanette Zaragoza.      Chronic anemia POA  Current  hemoglobin is 10.3     Hyperlipidemia POA  Atrial fibrillation s/p Ablation in 2021  Essential HTN POA  Prior  Xarelto but was discontinued due to GIB.    Off Amiodarone per patient  Continue losartan, BP still uncontrolled, ask Novasc AM   PRN hydralazine  Continue Statin    Alcohol use  1 bottle wine per night, last drink 4 days ago  History of alcohol withdrawal  Po thiamine   CIWA scale, currently no signs of withdrawal    Overweight POA Body mass index is 26.16 kg/m².     Code Status:full     Surrogate Decision Maker: spouse     DVT Prophylaxis: Lovenox   GI Prophylaxis: not indicated     Baseline: independent lives with family    Subjective:     Chief Complaint / Reason for Physician Visit f/u right diabetic foot infection/osteomyelitis  \"no problems\". Discussed with RN events overnight. No pain in foot  No complaints  Pain is appropriately controlled    Review of Systems:  Symptom Y/N Comments  Symptom Y/N Comments   Fever/Chills n   Chest Pain n    Poor Appetite    Edema     Cough n   Abdominal Pain n    Sputum    Joint Pain     SOB/MANCIA n   Headache     Nausea/vomit n   Tolerating PT/OT     Diarrhea n   Tolerating Diet y    Constipation    Other       Could NOT obtain due to:      Objective:     VITALS:   Last 24hrs VS reviewed since prior progress note. Most recent are:  Patient Vitals for the past 24 hrs:   Temp Pulse Resp BP SpO2   09/29/21 0725 98 °F (36.7 °C) 79 18 (!) 177/73 95 %   09/29/21 0439 98.4 °F (36.9 °C) 85 18 (!) 164/81 95 %   09/28/21 2333 98.6 °F (37 °C) 90 18 (!) 146/74 98 %   09/28/21 2031 98.6 °F (37 °C) 95 18 (!) 150/73 99 %   09/28/21 1811    (!) 127/56    09/28/21 1715  81  (!) 180/77    09/28/21 1456 98.6 °F (37 °C) 97 18 (!) 173/71 100 %   09/28/21 1301    (!) 175/85    09/28/21 1114 98.2 °F (36.8 °C) 80 18 (!) 184/77 100 %       Intake/Output Summary (Last 24 hours) at 9/29/2021 4913  Last data filed at 9/29/2021 0237  Gross per 24 hour   Intake 1180 ml   Output 1450 ml   Net -270 ml        Wt Readings from Last 12 Encounters:   09/26/21 87.5 kg (192 lb 14.4 oz)   03/25/21 79.4 kg (175 lb)   03/22/21 82.6 kg (182 lb 1.6 oz)   03/16/21 80.2 kg (176 lb 11.2 oz)   03/09/21 80.7 kg (178 lb)       PHYSICAL EXAM:    I had a face to face encounter and independently examined this patient on 09/29/21 as outlined below:    General: WD, WN. Alert, cooperative, no acute distress    EENT:  PERRL. Anicteric sclerae.  MMM  Resp:  CTA bilaterally, no wheezing or rales. No accessory muscle use  CV:  Regular  rhythm,  No edema  GI:  Soft, Non distended, Non tender. +Bowel sounds, no rebound  MS:  Right foot bandaged  Neurologic:  Alert and oriented X 3, normal speech, non focal motor exam  Psych:   Good insight. Not anxious nor agitated  Skin:  No rashes. No jaundice    Reviewed most current lab test results and cultures  YES  Reviewed most current radiology test results   YES  Review and summation of old records today    NO  Reviewed patient's current orders and MAR    YES  PMH/SH reviewed - no change compared to H&P  ________________________________________________________________________  Care Plan discussed with:    Comments   Patient y         RN y    Care Manager     Consultant                        Multidiciplinary team rounds were held today with , nursing, pharmacist and clinical coordinator. Patient's plan of care was discussed; medications were reviewed and discharge planning was addressed. ________________________________________________________________________      Comments   >50% of visit spent in counseling and coordination of care     ________________________________________________________________________  Juno Self MD     Procedures: see electronic medical records for all procedures/Xrays and details which were not copied into this note but were reviewed prior to creation of Plan. LABS:  I reviewed today's most current labs and imaging studies.   Pertinent labs include:  Recent Labs     09/28/21 0331 09/27/21  0155   WBC 3.2* 3.7*   HGB 9.5* 8.7*   HCT 30.0* 28.3*   * 107*     Recent Labs     09/29/21  0406 09/28/21  0331 09/27/21  0155    136 134*   K 3.8 3.9 3.9   * 107 106   CO2 22 24 27   * 110* 163*   BUN 14 16 21*   CREA 1.00 0.91 1.10   CA 9.7 8.8 8.7   ALB  --  2.5* 2.5*   TBILI  --  1.2* 0.8   ALT  --  58 57

## 2021-09-30 ENCOUNTER — TELEPHONE (OUTPATIENT)
Dept: FAMILY MEDICINE CLINIC | Age: 76
End: 2021-09-30

## 2021-09-30 ENCOUNTER — TELEPHONE (OUTPATIENT)
Dept: INFECTIOUS DISEASES | Age: 76
End: 2021-09-30

## 2021-09-30 LAB
ANION GAP SERPL CALC-SCNC: 5 MMOL/L (ref 5–15)
BACTERIA SPEC CULT: NORMAL
BACTERIA SPEC CULT: NORMAL
BUN SERPL-MCNC: 15 MG/DL (ref 6–20)
BUN/CREAT SERPL: 16 (ref 12–20)
CALCIUM SERPL-MCNC: 9.1 MG/DL (ref 8.5–10.1)
CHLORIDE SERPL-SCNC: 110 MMOL/L (ref 97–108)
CO2 SERPL-SCNC: 22 MMOL/L (ref 21–32)
CREAT SERPL-MCNC: 0.95 MG/DL (ref 0.7–1.3)
GLUCOSE BLD STRIP.AUTO-MCNC: 125 MG/DL (ref 65–117)
GLUCOSE BLD STRIP.AUTO-MCNC: 126 MG/DL (ref 65–117)
GLUCOSE BLD STRIP.AUTO-MCNC: 155 MG/DL (ref 65–117)
GLUCOSE BLD STRIP.AUTO-MCNC: 173 MG/DL (ref 65–117)
GLUCOSE SERPL-MCNC: 124 MG/DL (ref 65–100)
GRAM STN SPEC: NORMAL
GRAM STN SPEC: NORMAL
POTASSIUM SERPL-SCNC: 3.6 MMOL/L (ref 3.5–5.1)
SERVICE CMNT-IMP: ABNORMAL
SERVICE CMNT-IMP: NORMAL
SERVICE CMNT-IMP: NORMAL
SODIUM SERPL-SCNC: 137 MMOL/L (ref 136–145)

## 2021-09-30 PROCEDURE — 80048 BASIC METABOLIC PNL TOTAL CA: CPT

## 2021-09-30 PROCEDURE — 2709999900 HC NON-CHARGEABLE SUPPLY

## 2021-09-30 PROCEDURE — 74011250637 HC RX REV CODE- 250/637: Performed by: PODIATRIST

## 2021-09-30 PROCEDURE — 65660000000 HC RM CCU STEPDOWN

## 2021-09-30 PROCEDURE — 82962 GLUCOSE BLOOD TEST: CPT

## 2021-09-30 PROCEDURE — 76937 US GUIDE VASCULAR ACCESS: CPT

## 2021-09-30 PROCEDURE — 02HV33Z INSERTION OF INFUSION DEVICE INTO SUPERIOR VENA CAVA, PERCUTANEOUS APPROACH: ICD-10-PCS | Performed by: INTERNAL MEDICINE

## 2021-09-30 PROCEDURE — 36573 INSJ PICC RS&I 5 YR+: CPT | Performed by: INTERNAL MEDICINE

## 2021-09-30 PROCEDURE — C1751 CATH, INF, PER/CENT/MIDLINE: HCPCS

## 2021-09-30 PROCEDURE — 74011250636 HC RX REV CODE- 250/636: Performed by: PODIATRIST

## 2021-09-30 PROCEDURE — 74011636637 HC RX REV CODE- 636/637: Performed by: PODIATRIST

## 2021-09-30 PROCEDURE — 74011000258 HC RX REV CODE- 258: Performed by: STUDENT IN AN ORGANIZED HEALTH CARE EDUCATION/TRAINING PROGRAM

## 2021-09-30 PROCEDURE — 36415 COLL VENOUS BLD VENIPUNCTURE: CPT

## 2021-09-30 PROCEDURE — 77030018786 HC NDL GD F/USND BARD -B

## 2021-09-30 PROCEDURE — 74011250637 HC RX REV CODE- 250/637: Performed by: INTERNAL MEDICINE

## 2021-09-30 PROCEDURE — 74011250636 HC RX REV CODE- 250/636: Performed by: STUDENT IN AN ORGANIZED HEALTH CARE EDUCATION/TRAINING PROGRAM

## 2021-09-30 PROCEDURE — 99233 SBSQ HOSP IP/OBS HIGH 50: CPT | Performed by: STUDENT IN AN ORGANIZED HEALTH CARE EDUCATION/TRAINING PROGRAM

## 2021-09-30 RX ORDER — BACITRACIN 500 UNIT/G
1 PACKET (EA) TOPICAL AS NEEDED
Status: DISCONTINUED | OUTPATIENT
Start: 2021-09-30 | End: 2021-10-01 | Stop reason: HOSPADM

## 2021-09-30 RX ADMIN — Medication 10 ML: at 22:07

## 2021-09-30 RX ADMIN — Medication 100 MG: at 09:24

## 2021-09-30 RX ADMIN — AMLODIPINE BESYLATE 2.5 MG: 5 TABLET ORAL at 09:25

## 2021-09-30 RX ADMIN — INSULIN LISPRO 2 UNITS: 100 INJECTION, SOLUTION INTRAVENOUS; SUBCUTANEOUS at 11:35

## 2021-09-30 RX ADMIN — CEFEPIME HYDROCHLORIDE 2 G: 2 INJECTION, POWDER, FOR SOLUTION INTRAVENOUS at 04:41

## 2021-09-30 RX ADMIN — Medication 1 AMPULE: at 22:06

## 2021-09-30 RX ADMIN — ASPIRIN 81 MG: 81 TABLET, CHEWABLE ORAL at 09:24

## 2021-09-30 RX ADMIN — CEFEPIME HYDROCHLORIDE 2 G: 2 INJECTION, POWDER, FOR SOLUTION INTRAVENOUS at 10:54

## 2021-09-30 RX ADMIN — METRONIDAZOLE 500 MG: 500 INJECTION, SOLUTION INTRAVENOUS at 09:24

## 2021-09-30 RX ADMIN — OXYCODONE 5 MG: 5 TABLET ORAL at 04:41

## 2021-09-30 RX ADMIN — PANTOPRAZOLE SODIUM 40 MG: 40 TABLET, DELAYED RELEASE ORAL at 06:40

## 2021-09-30 RX ADMIN — ACETAMINOPHEN 650 MG: 325 TABLET ORAL at 10:54

## 2021-09-30 RX ADMIN — METRONIDAZOLE 500 MG: 500 INJECTION, SOLUTION INTRAVENOUS at 15:46

## 2021-09-30 RX ADMIN — ROSUVASTATIN CALCIUM 10 MG: 10 TABLET, COATED ORAL at 22:06

## 2021-09-30 RX ADMIN — ENOXAPARIN SODIUM 40 MG: 40 INJECTION SUBCUTANEOUS at 09:24

## 2021-09-30 RX ADMIN — Medication 10 ML: at 13:50

## 2021-09-30 RX ADMIN — METRONIDAZOLE 500 MG: 500 INJECTION, SOLUTION INTRAVENOUS at 23:48

## 2021-09-30 RX ADMIN — Medication 1 AMPULE: at 09:26

## 2021-09-30 RX ADMIN — LOSARTAN POTASSIUM 100 MG: 100 TABLET, FILM COATED ORAL at 18:28

## 2021-09-30 RX ADMIN — CEFEPIME HYDROCHLORIDE 2 G: 2 INJECTION, POWDER, FOR SOLUTION INTRAVENOUS at 17:44

## 2021-09-30 NOTE — PROGRESS NOTES
Infectious Disease Progress Note         Interval:  No acute events overnight    Subjective:   Patient seen this morning. Patient has decided to go withIV abx for now. Objective:    Vitals:   Reviewed in chart. Physical Exam:  ? GEN: NAD  ? HEENT: Normocephalic, atraumatic, PERRL, no scleral icterus  ? CV: Hemodynamically stable  ? Lungs: Clear to auscultation bilaterally  ? Abdomen: soft, non distended, non tender,  ? Genitourinary:  no ward  ? Extremities: no edema  ? Neuro: Alert, oriented to time, place and situation, moves all extremities to commands, verbal   ? Skin: Right foot hallux seen 9/29/21.  ?  Psych: good affect, good eye contact, non tearful   Lines: Peripheral IV lines        Labs:  Recent Results (from the past 24 hour(s))   GLUCOSE, POC    Collection Time: 09/29/21 11:47 AM   Result Value Ref Range    Glucose (POC) 153 (H) 65 - 117 mg/dL    Performed by I-MD PCT    Mraie Lager, TROUGH    Collection Time: 09/29/21  1:13 PM   Result Value Ref Range    Vancomycin,trough 12.8 (H) 5.0 - 10.0 ug/mL    Reported dose date NOT PROVIDED      Reported dose time: NOT PROVIDED      Reported dose: NOT PROVIDED UNITS   GLUCOSE, POC    Collection Time: 09/29/21  3:27 PM   Result Value Ref Range    Glucose (POC) 126 (H) 65 - 117 mg/dL    [facility]    GLUCOSE, POC    Collection Time: 09/29/21  9:10 PM   Result Value Ref Range    Glucose (POC) 125 (H) 65 - 117 mg/dL    Performed by Ratna Manning (PCT)    METABOLIC PANEL, BASIC    Collection Time: 09/30/21  4:35 AM   Result Value Ref Range    Sodium 137 136 - 145 mmol/L    Potassium 3.6 3.5 - 5.1 mmol/L    Chloride 110 (H) 97 - 108 mmol/L    CO2 22 21 - 32 mmol/L    Anion gap 5 5 - 15 mmol/L    Glucose 124 (H) 65 - 100 mg/dL    BUN 15 6 - 20 MG/DL    Creatinine 0.95 0.70 - 1.30 MG/DL    BUN/Creatinine ratio 16 12 - 20      GFR est AA >60 >60 ml/min/1.73m2    GFR est non-AA >60 >60 ml/min/1.73m2    Calcium 9.1 8.5 - 10.1 MG/DL   GLUCOSE, POC    Collection Time: 09/30/21  6:39 AM   Result Value Ref Range    Glucose (POC) 126 (H) 65 - 117 mg/dL    Performed by Clarissa Cano RN            Assessment:  78y.o. year old male with history of DM2, who presented to the ED on 9/26/2021 for worsening right hallux wound. Patient reports that an ulcer started on his toe after what he thinks could be from his about a year ago. He had graft placement at the wound on June 30, 2021 as there was no concern for active infection at that time. Patient reports that the wound has intermittently had some drainage but does not reported to be thick or yellow. He denies any fevers or chills. I discussed case with Dr. Nicky Wheeler today and the patient had wound cultures taken on 9/16/2021 which grew Streptococcus pyogenes, Pseudomonas and Enterobacter cloacae. We do not have the reports of this available at this time. Patient was put on oral Augmentin about a week prior to the current admission; patient was still taking Augmentin just before getting admitted here. Imaging here has showed osteomyelitis of the right hallux at the proximal and distal phalanges and possible IP joint septic arthritis. He is status post debridement of the right toe on 9/26/2021. Pathology and operative cultures grew Bacteroides pyogen - Beta-lactamase positive. He has been on vancomycin and Zosyn since admission. Recommendations:  -Patient has decided to be on IV abx for now and see how it goes. - Cefepime 2gm q8h, Duration 6 weeks, 9/27/21 to 11/8/21.   - IV metronidzole 500mg q8h, Duration 4 weeks, 9/27/21 to 10/25/21.   - Follow up with ID clinic in 2 weeks. - Follow up with Podiatry. · Please have labs done on weekly basis for CBC with diff,CMP, ESR, CRP  and have results sent to me by faxing to  [phone]. · Call with critical labs at [phone].   · Please ensure that patient has PICC care arranged per protocol   · Once IV antibiotics have been discontinued, please dno not remove PICC line - ask ID if OK to remove or not. · Smoking cessation encouraged if patient is current smoker to aid in infection and wound healing        We will sign of now, please recall as needed. Thank you for the opportunity to participate in the care of this patient. Please contact with questions or concerns.       Umer Cruz MD  Infectious Diseases

## 2021-09-30 NOTE — PROGRESS NOTES
End of Shift Note    Bedside shift change report given to Mary Correa RN (oncoming nurse) by Denis Layne RN (offgoing nurse). Report included the following information SBAR, Kardex, Intake/Output, MAR and Recent Results    Shift worked:  [id number]     Shift summary and any significant changes:     Pt rested in bed with c/o pain; controlled with prns. Voiding appropriately. Dressing to R foot C/D/I with no drainage. Uneventful shift.       Concerns for physician to address:  none   Zone phone for oncoming shift:              Denis Layne RN

## 2021-09-30 NOTE — PROGRESS NOTES
PICC (Peripherally Inserted Central Catheter) line insertion  procedure note :     Procedure explained to patient along with risks and benefits  and patient agreed to proceed. Informed  consent obtained from  patient. Patient teaching completed. Timeout completed. Pre-procedure assessment done. Maximum sterile barrier precautions observed throughout procedure. Lidocaine 1%  3.0    ml sq given prior to cannulation. Cannulated basilic  vein using ultrasound guidance and modified seldinger technique. Inserted 4  Turkish single  lumen PICC to right arm using Oree Tip Location System and  [address] [place] [address]. Pt has    sinus   rhythm. PICC tip location was confirmed by 3 CG   tip positioning system, indicating tall P wave and no negative deflection before P wave which would indicate that the PICC tip is properly placed in the distal SVC or at the [place]. PICC tip location was  confirmed by 2 PICC nurses and printout placed on patient's chart. Blood return verified and flushed with 20 ml normal saline in each port. Sterile dressing applied with biopatch, statLock and occlusive dressing as per protocol. Curos caps applied to each port. Patient tolerated procedure well with minimal blood loss ( less than 5 ml.)   Patient education material provided. PICC procedure performed by  :  Tricia Harris RN. SANDRA. CMSRN. PICC nurse. Vascular Access Team.   Assisted by : Lyle Van RN  PICC nurse  Reason for access : reliable access / MD order /   Manan cristina IV medication administration for 6 weeks  / Discharge with PICC line   Complications related to insertion  : none  X-Ray : not applicable  Notified primary nurse    Halima Mckeon RN  that  PICC line can be used.      Measured length :     44  cm    Trimmed Length :  44   cm   External Length :   0 cm    PICC line site arm circumference:    28    cm   PICC catheter occupies   18   % of vein  Type of PICC: Bard Solo Power PICC        Ref # : [id number]     Lot # :  [id number]  Expiration Date :    2022-09-30       Bacilio Nowak RN. BSN. SANDRA,CMSRN.  PICC Nurse, Vascular Access Team.

## 2021-09-30 NOTE — PROGRESS NOTES
Podiatry    Subjective: Patient relates 1 year Hx of wound plantar right hallux treated with wound care, ABX and recently a skin substitute graft by Dr. Ron Scott as outpatient. The toe became red and swollen last week, and I saw him in the office since Dr. Ron Scott was out of town. X-rays in the office revealed early destructive changes to the medial hallux phalanges around the IPJ and the wound probed to bone. We discussed amputation vs. debridement and IV ABX, but he could not decide at the time. He was given an Rx for Augmentin and told to keep his scheduled F/U next week. He noticed worsening of the erythema and edema despite the ABX, and presented to the ED. He is now PO debridement of the wound and bone. Patient is a 78 y.o.  male who is being seen for osteomyelitis right hallux. Patient Active Problem List    Diagnosis Date Noted    Diabetic foot ([address] 2021    S/P TAVR (transcatheter aortic valve replacement) 2021    Aortic stenosis 2021    Severe aortic stenosis 2021    GIB (gastrointestinal bleeding) 2021     Past Medical History:   Diagnosis Date    Aortic regurgitation     Aortic stenosis     Atrial fibrillation (HCC)     Colon polyps     Diabetes mellitus, type 2 (HCC)     GI bleed     HTN (hypertension)       Past Surgical History:   Procedure Laterality Date    COLONOSCOPY N/A 3/16/2021    [facility]il [facility]    HX AFIB ABLATION  2021      History reviewed. No pertinent family history.    Social History     Tobacco Use    Smoking status: Former Smoker     Types: Cigarettes     Quit date: 2000     Years since quittin.7    Smokeless tobacco: Never Used   Substance Use Topics    Alcohol use: Not on file     Current Facility-Administered Medications   Medication Dose Route Frequency Provider Last Rate Last Admin    bacitracin 500 unit/gram packet 1 Packet  1 Packet Topical PRN Raajn Myers MD  cefepime (MAXIPIME) 2 g in 0.9% sodium chloride (MBP/ADV) 100 mL MBP  2 g IntraVENous Q8H Ngozi Mak  mL/hr at 09/30/21 1054 2 g at 09/30/21 1054    metroNIDAZOLE (FLAGYL) IVPB premix 500 mg  500 mg IntraVENous Q8H Mitzi Joseph  mL/hr at 09/30/21 0924 500 mg at 09/30/21 0924    hydrALAZINE (APRESOLINE) 20 mg/mL injection 20 mg  20 mg IntraVENous Q6H PRN Jennifer Browning MD   20 mg at 09/29/21 2256    amLODIPine (NORVASC) tablet 2.5 mg  2.5 mg Oral DAILY Jennifer Browning MD   2.5 mg at 09/30/21 2890    LORazepam (ATIVAN) tablet 2 mg  2 mg Oral Q1H PRN Jennifer Browning MD        LORazepam (ATIVAN) tablet 4 mg  4 mg Oral Q1H PRN Jennifer Browning MD        thiamine mononitrate (B-1) tablet 100 mg  100 mg Oral DAILY Jennifer Browning MD   100 mg at 09/30/21 2560    sodium chloride (NS) flush 5-40 mL  5-40 mL IntraVENous Q8H Oksana Echeverria DPM   10 mL at 09/30/21 1350    sodium chloride (NS) flush 5-40 mL  5-40 mL IntraVENous PRN Oksana Echeverria DPM        acetaminophen (TYLENOL) tablet 650 mg  650 mg Oral Q6H PRN Oksana Echeverria DPM   650 mg at 09/30/21 1054    Or    acetaminophen (TYLENOL) suppository 650 mg  650 mg Rectal Q6H PRN Oksana Echeverria DPM        polyethylene glycol (MIRALAX) packet 17 g  17 g Oral DAILY PRN Oksana Echeverria DPM        ondansetron (ZOFRAN ODT) tablet 4 mg  4 mg Oral Q8H PRN Oksana Echeverria DPM        Or    ondansetron (ZOFRAN) injection 4 mg  4 mg IntraVENous Q6H PRN Oksana Echeverria DPM        enoxaparin (LOVENOX) injection 40 mg  40 mg SubCUTAneous DAILY Oksana Echeverria DPM   40 mg at 09/30/21 1419    insulin lispro (HUMALOG) injection   SubCUTAneous AC&HS Oksana Echeverria DPM   2 Units at 09/30/21 1135    glucose chewable tablet 16 g  4 Tablet Oral PRN Oksana Echeverria DPM        dextrose (D50W) injection syrg 12.5-25 g  12.5-25 g IntraVENous PRN Oksana Echeverria DPM        glucagon (GLUCAGEN) injection 1 mg  1 mg IntraMUSCular PRN Belock, Halie Tomales, DPM        pantoprazole (PROTONIX) tablet 40 mg  40 mg Oral ACB Belock, Halie Tomales, DPM   40 mg at 21 0640    rosuvastatin (CRESTOR) tablet 10 mg  10 mg Oral QHS Belpili, Halie Tomales, DPM   10 mg at 21 2256    alcohol 62% (NOZIN) nasal  1 Ampule  1 Ampule Topical Q12H BelHalie alejandre Tomales, DPM   1 Ampule at 21 0926    oxyCODONE IR (ROXICODONE) tablet 5 mg  5 mg Oral Q6H PRN Halie Echeverria Tomales, DPM   5 mg at 21 0441    aspirin chewable tablet 81 mg  81 mg Oral DAILY Atul Sotelo MD   81 mg at 21 6275    losartan (COZAAR) tablet 100 mg  100 mg Oral QPM Atul Sotelo MD   100 mg at 21 1737      No Known Allergies     Review of Systems:  AO x4. NAD. No fever or chills. Objective:     Patient Vitals for the past 8 hrs:   BP Temp Pulse Resp SpO2   21 1523 (!) 166/73 97.8 °F (36.6 °C) 88 18 99 %   21 1115 (!) 151/70 98.5 °F (36.9 °C) 86 16 97 %   21 0753 (!) 163/76 98.5 °F (36.9 °C) 89 16 98 %     Temp (24hrs), Av.4 °F (36.9 °C), Min:97.8 °F (36.6 °C), Max:99 °F (37.2 °C)      Physical Exam Lower Extremities:    DP and PT 2/4 bilaterally; CFT less than 3 seconds. Normal hair growth. Erythema and edema right hallux is reduced. The wound plantar right hallux is partially closed with retention sutures.   Sensation absent to fine touch right hallux  No POP right hallux    Pathology: +Acute osteomyelitis  C&S: no growth    Lab Review:   Recent Results (from the past 24 hour(s))   GLUCOSE, POC    Collection Time: 21  9:10 PM   Result Value Ref Range    Glucose (POC) 125 (H) 65 - 117 mg/dL    Performed by Deon Cao (CLARITZA)    METABOLIC PANEL, BASIC    Collection Time: 21  4:35 AM   Result Value Ref Range    Sodium 137 136 - 145 mmol/L    Potassium 3.6 3.5 - 5.1 mmol/L    Chloride 110 (H) 97 - 108 mmol/L    CO2 22 21 - 32 mmol/L    Anion gap 5 5 - 15 mmol/L    Glucose 124 (H) 65 - 100 mg/dL    BUN 15 6 - 20 MG/DL Creatinine 0.95 0.70 - 1.30 MG/DL    BUN/Creatinine ratio 16 12 - 20      GFR est AA >60 >60 ml/min/1.73m2    GFR est non-AA >60 >60 ml/min/1.73m2    Calcium 9.1 8.5 - 10.1 MG/DL   GLUCOSE, POC    Collection Time: 09/30/21  6:39 AM   Result Value Ref Range    Glucose (POC) 126 (H) 65 - 117 mg/dL    Performed by Jazmine Lynn RN    GLUCOSE, POC    Collection Time: 09/30/21 11:17 AM   Result Value Ref Range    Glucose (POC) 173 (H) 65 - 117 mg/dL    Performed by Joseph Singh        Impression:   1. Cellulitis right hallux  2. Septic arthritis right hallux IPJ  3. Osteomyelitis right hallux proximal and distal phalanges    Recommendation:   Dressings changed. The patient has decided to take the long course of IV antibiotics and try to salvage the toe. I will enter wound care orders for eventual discharge. He should F/U with me weekly once discharged.

## 2021-09-30 NOTE — PROGRESS NOTES
End of Shift Note    Bedside shift change report given to Floretta Hammans (oncoming nurse) by Karli Garcia (offgoing nurse). Report included the following information SBAR, Kardex, Procedure Summary, Intake/Output, MAR and Recent Results    Shift worked:  7a-7p     Shift summary and any significant changes:     Pt received scheduled antibiotics. Dressing changed to foot after wound was examined by Dr. Cassi Lopez. No complaints of pain. Pt tolerating diet. Up ad xu. Concerns for physician to address:       Zone phone for oncoming shift:          Activity:  Activity Level: Up ad xu  Number times ambulated in hallways past shift: 0  Number of times OOB to chair past shift: 0    Cardiac:   Cardiac Monitoring: No      Cardiac Rhythm: Sinus Rhythm    Access:   Current line(s): PIV     Genitourinary:   Urinary status: voiding    Respiratory:   O2 Device: None (Room air)  Chronic home O2 use?: NO  Incentive spirometer at bedside: N/A     GI:  Last Bowel Movement Date: 09/29/21  Current diet:  ADULT DIET Regular; 4 carb choices (60 gm/meal)  Passing flatus: YES  Tolerating current diet: YES       Pain Management:   Patient states pain is manageable on current regimen: YES    Skin:  Lux Score: 21  Interventions: increase time out of bed    Patient Safety:  Fall Score:  Total Score: 1  Interventions: gripper socks       Length of Stay:  Expected LOS: 2d 21h  Actual LOS: [address]

## 2021-09-30 NOTE — PROGRESS NOTES
Transition of Care Plan:     RUR:   15% LOW  Disposition: Home w/ IV abx & f/u appts  Follow up appointments: PCP, Podiatry, Infectious Disease- details in AVS  DME needed: IV abx- [facility]  Transportation at Discharge: Wife; ETA 4 PM  Keys or means to access home:  Yes     IM Medicare Letter: N/A  Is patient a BCPI-A Bundle:  No                    If yes, was Bundle Letter given?:   N/A  Caregiver Contact: LNOK/ Wife- Alena Burns, [phone]  Discharge Caregiver contacted prior to discharge? CM acknowledged d/c.    Update 2:42 PM  CM received phone call from pt's wife w/ concerns regarding pt's d/c home today, expressing she feels discharge is being rushed. CM reviewed d/c plan w/ wife & informed her pt will receive support w/ PICC line & IV abx from infusion company. No current orders in for wound care at this time- podiatry can order this as needed. CM notified RN to provided pt wife wound care supplies as needed prior to d/c. Infusion company to meet w/ pt & wife at 4 PM to provide IV abx teaching. If no addtl needs arise pt will be ready to d/c following bedside teaching. RN notified. Update 2:05 PM  CM received update that pt will be fully covered for IV abx. [facility] to contact pt to set up bedside teaching prior to d/c. Will continue to follow. Update 12:07 PM  CM reviewed pt's chart. [employer] denied pt; reason unknown. [facility] currently running pt's benefits but can provide PICC care & labs in the home in addt to IV abx if applicable. Bedside teaching to be arranged once IV abx are secured. Will continue to follow. Update 11:30 AM  CM acknowledged consult for IV abx. CM faxed orders to [employer] via [facility]. [place] referral sent to [employer]. Will report updates as they become available. Initial note-   CM reviewed pt's chart. Pt wanting to move forward w/ IV abx rather than amputation at this time. CM met w/ pt at bedside to discuss d/c plan.  Pt verbalized understanding & is agreeable to CM securing HH for d/c to assist w/ IV abx. PICC line just placed. Waiting on final IV abx recs to send to [facility] (). Will continue to follow. Care Management Interventions  PCP Verified by CM: Yes  Palliative Care Criteria Met (RRAT>21 & CHF Dx)?: No  Mode of Transport at Discharge:  Other (see comment) (Wife)  Hospital Transport Time of Discharge: [address]  (CM Consult): Discharge Planning  Discharge Durable Medical Equipment: No  Physical Therapy Consult: No  Occupational Therapy Consult: No  Support Systems: Spouse/Significant Other, Child(carissa) (lives in 2 level/ [facility] house)  Confirm Follow Up Transport: Family  The Plan for Transition of Care is Related to the Following Treatment Goals : Home w/ IV abx & f/u appts  The Patient and/or Patient Representative was Provided with a Choice of Provider and Agrees with the Discharge Plan?: Yes  Discharge Location  Discharge Placement: Home with family assistance (w/ IV abx & f/u appts)      Janneth Torrez MSW  Care Management

## 2021-09-30 NOTE — PROGRESS NOTES
Podiatry    Subjective: Patient relates 1 year Hx of wound plantar right hallux treated with wound care, ABX and recently a skin substitute graft by Dr. Pauline Loaiza as outpatient. The toe became red and swollen last week, and I saw him in the office since Dr. Pauline Loaiza was out of town. X-rays in the office revealed early destructive changes to the medial hallux phalanges around the IPJ and the wound probed to bone. We discussed amputation vs. debridement and IV ABX, but he could not decide at the time. He was given an Rx for Augmentin and told to keep his scheduled F/U next week. He noticed worsening of the erythema and edema despite the ABX, and presented to the ED. He is now PO debridement of the wound and bone. Patient is a 78 y.o.  male who is being seen for osteomyelitis right hallux. Patient Active Problem List    Diagnosis Date Noted    Diabetic [address] ([facility] [address] 2021    S/P TAVR (transcatheter aortic valve replacement) 2021    Aortic stenosis 2021    Severe aortic stenosis 2021    GIB (gastrointestinal bleeding) 2021     Past Medical History:   Diagnosis Date    Aortic regurgitation     Aortic stenosis     Atrial fibrillation (HCC)     Colon polyps     Diabetes mellitus, type 2 (HCC)     GI bleed     HTN (hypertension)       Past Surgical History:   Procedure Laterality Date    COLONOSCOPY N/A 3/16/2021    COL[facility]COPY performed by Noelle Kelley MD at [facility] ENDOSCOPY    HX AFIB ABLATION  2021      History reviewed. No pertinent family history.    Social History     Tobacco Use    Smoking status: Former Smoker     Types: Cigarettes     Quit date: 2000     Years since quittin.7    Smokeless tobacco: Never Used   Substance Use Topics    Alcohol use: Not on file     Current Facility-Administered Medications   Medication Dose Route Frequency Provider Last Rate Last Admin    bacitracin 500 unit/gram packet 1 Packet  1 Packet Topical PRN Francisco Hobson MD  cefepime (MAXIPIME) 2 g in 0.9% sodium chloride (MBP/ADV) 100 mL MBP  2 g IntraVENous Q8H Guillermo Mak  mL/hr at 09/30/21 1054 2 g at 09/30/21 1054    metroNIDAZOLE (FLAGYL) IVPB premix 500 mg  500 mg IntraVENous Q8H [place]  mL/hr at 09/30/21 0924 500 mg at 09/30/21 0924    hydrALAZINE (APRESOLINE) 20 mg/mL injection 20 mg  20 mg IntraVENous Q6H PRN William Hill MD   20 mg at 09/29/21 2256    amLODIPine (NORVASC) tablet 2.5 mg  2.5 mg Oral DAILY William Hill MD   2.5 mg at 09/30/21 5555    LORazepam (ATIVAN) tablet 2 mg  2 mg Oral Q1H PRN William Hill MD        LORazepam (ATIVAN) tablet 4 mg  4 mg Oral Q1H PRN William Hill MD        thiamine mononitrate (B-1) tablet 100 mg  100 mg Oral DAILY William Hill MD   100 mg at 09/30/21 5602    sodium chloride (NS) flush 5-40 mL  5-40 mL IntraVENous Q8H Gilbert Echeverria, DPM   10 mL at 09/30/21 1350    sodium chloride (NS) flush 5-40 mL  5-40 mL IntraVENous PRN Gilbert Echeverria, DPM        acetaminophen (TYLENOL) tablet 650 mg  650 mg Oral Q6H PRN Gilbert Echeverria, DPM   650 mg at 09/30/21 1054    Or    acetaminophen (TYLENOL) suppository 650 mg  650 mg Rectal Q6H PRN Gilbert Echeverria Blunt, DPM        polyethylene glycol (MIRALAX) packet 17 g  17 g Oral DAILY PRN Gilbert Echeverria, DPM        ondansetron (ZOFRAN ODT) tablet 4 mg  4 mg Oral Q8H PRN Gilbert Echeverria, DPM        Or    ondansetron (ZOFRAN) injection 4 mg  4 mg IntraVENous Q6H PRN Gilbert Echeverria, DPM        enoxaparin (LOVENOX) injection 40 mg  40 mg SubCUTAneous DAILY Gilbert Echeverria, DPM   40 mg at 09/30/21 9974    insulin lispro (HUMALOG) injection   SubCUTAneous AC&HS Gilbert Echeverria DPM   2 Units at 09/30/21 1135    glucose chewable tablet 16 g  4 Tablet Oral PRN Gilbert Echeverria DPM        dextrose (D50W) injection syrg 12.5-25 g  12.5-25 g IntraVENous PRN Gilbert Echeverria DPM        glucagon (GLUCAGEN) injection 1 mg  1 mg IntraMUSCular PRN Belpili, Teri Cunningham, DPM        pantoprazole (PROTONIX) tablet 40 mg  40 mg Oral ACB Teri Echeverria Cunningham, DPM   40 mg at 21 0640    rosuvastatin (CRESTOR) tablet 10 mg  10 mg Oral QHS Teri Echeverria Cunningham, DPM   10 mg at 21 2256    alcohol 62% (NOZIN) nasal  1 Ampule  1 Ampule Topical Q12H Yohana Echeverriaclaudio Cunningham, DPM   1 Ampule at 21 0926    oxyCODONE IR (ROXICODONE) tablet 5 mg  5 mg Oral Q6H PRN Teri Echeverrias, DPM   5 mg at 21 0441    aspirin chewable tablet 81 mg  81 mg Oral DAILY Gasper Ace MD   81 mg at 21 9068    losartan (COZAAR) tablet 100 mg  100 mg Oral QPM Gasper Ace MD   100 mg at 21 1737      No Known Allergies     Review of Systems:  AO x4. NAD. No fever or chills. Objective:     Patient Vitals for the past 8 hrs:   BP Temp Pulse Resp SpO2   21 1523 (!) 166/73 97.8 °F (36.6 °C) 88 18 99 %   21 1115 (!) 151/70 98.5 °F (36.9 °C) 86 16 97 %   21 0753 (!) 163/76 98.5 °F (36.9 °C) 89 16 98 %     Temp (24hrs), Av.4 °F (36.9 °C), Min:97.8 °F (36.6 °C), Max:99 °F (37.2 °C)      Physical Exam Lower Extremities:    DP and PT 2/4 bilaterally; CFT less than 3 seconds. Normal hair growth. Erythema and edema right hallux is reduced. The wound plantar right hallux is partially closed with retention sutures.   Sensation absent to fine touch right hallux  No POP right hallux    Pathology: +Acute osteomyelitis  C&S: no growth    Lab Review:   Recent Results (from the past 24 hour(s))   GLUCOSE, POC    Collection Time: 21  9:10 PM   Result Value Ref Range    Glucose (POC) 125 (H) 65 - 117 mg/dL    Performed by Merissa Altamirano (CLARITZA)    METABOLIC PANEL, BASIC    Collection Time: 21  4:35 AM   Result Value Ref Range    Sodium 137 136 - 145 mmol/L    Potassium 3.6 3.5 - 5.1 mmol/L    Chloride 110 (H) 97 - 108 mmol/L    CO2 22 21 - 32 mmol/L    Anion gap 5 5 - 15 mmol/L    Glucose 124 (H) 65 - 100 mg/dL    BUN 15 6 - 20 MG/DL Creatinine 0.95 0.70 - 1.30 MG/DL    BUN/Creatinine ratio 16 12 - 20      GFR est AA >60 >60 ml/min/1.73m2    GFR est non-AA >60 >60 ml/min/1.73m2    Calcium 9.1 8.5 - 10.1 MG/DL   GLUCOSE, POC    Collection Time: 09/30/21  6:39 AM   Result Value Ref Range    Glucose (POC) 126 (H) 65 - 117 mg/dL    Performed by Britton Haile RN    GLUCOSE, POC    Collection Time: 09/30/21 11:17 AM   Result Value Ref Range    Glucose (POC) 173 (H) 65 - 117 mg/dL    Performed by [place]        Impression:   1. Cellulitis right hallux  2. Septic arthritis right hallux IPJ  3. Osteomyelitis right hallux proximal and distal phalanges    Recommendation:   Dressings changed. I spoke with the patient and his wife about the next steps. He is still on the fence about going back and having an amputation of the right hallux versus taking the long course of IV ABX. I explained the pros and cons of each path; at this time I think either approach is reasonable. I will F/U with him in the morning.

## 2021-09-30 NOTE — TELEPHONE ENCOUNTER
----- Message from Cori Dhillon sent at 9/30/2021  2:11 PM EDT -----  Regarding: /telephone  Contact: [phone]  Appointment not available    Caller's first and last name and relationship to patient (if not the patient): Haydee Vergara contact number: [phone]      Preferred date and time:  In 2 weeks      Scheduled appointment date and time: n/a      Reason for appointment: Needs a 2 week follow up appt       Details to clarify the request: n/a       Cori Dhillon

## 2021-09-30 NOTE — DISCHARGE SUMMARY
Hospitalist Discharge Summary     Patient ID:  Kartik Holland  [id number]  26 y.o.  1945 9/26/2021    PCP on record: Adilene Quijano MD    Admit date: 9/26/2021  Discharge date and time: 9/30/2021    DISCHARGE DIAGNOSIS:    Right toe diabetic foot infection with osteomyelitis POA  DM type II POA  Severe aortic Stenosis severe with moderate AI POA  Chronic anemia POA  Hyperlipidemia POA  Atrial fibrillation   Essential HTN POA  Alcohol use    CONSULTATIONS:  IP CONSULT TO PODIATRY  IP CONSULT TO INFECTIOUS DISEASES    Excerpted HPI from H&P of Whitney Elena MD:    Kartik Holland is a 78 y.o.  male who presents with an ulcer in the right great toe over the last few months. He had a skin graft done few days ago by podiatrist.  Pain he began to see swelling on the toes few days after the procedure. The whole foot started to's get swollen and painful. Denied any fever or chills. Patient was started on p.o. medication did not help him much. Patient denied any chest pain shortness of breath. Patient denied urinary or bowel habit changes.     We were asked to admit for work up and evaluation of the above problems. ______________________________________________________________________  DISCHARGE SUMMARY/HOSPITAL COURSE:  for full details see H&P, daily progress notes, labs, consult notes. Right toe diabetic foot infection with osteomyelitis POA  MRI right foot 9/25 at OSH      Extensive soft tissue swelling involving the great toe. Bone marrow edema involving most of the proximal/distal phalanges of great toe              Concerning for osteomyelitis. No drainable soft tissue abscess is identified.   Was taking augmentin at the time of admission   was on IV Zosyn and vancomycin for now  Blood cultures no growth so far  OR 9/26 with Dr Gwendolyn Zafar              Debridement of wound RIGHT GREAT TOE including BONE   Wound cultures pending              Possible anaerobic growth              Was on augmentin prior to admit, may affect cultures  Pathology Bone right great toe, biopsy:               Acute osteomyelitis. following antibiotics per ID  - Cefepime 2gm q8h, Duration 6 weeks, 9/27/21 to 11/8/21.   - IV metronidzole 500mg q8h, Duration 4 weeks, 9/27/21 to 10/25/21.   - Follow up with ID clinic in 2 weeks. - Follow up with Podiatry.        DM type II POA  Resume  p.o. home Metformin  Sliding scale insulin  , 116, 161, 132, 140      Severe aortic Stenosis severe with moderate AI POA  s/p TAVR 3/24/21 w/ Dr. Dory Carbajal and Dr. Burnard Dakin.      Levonne Salle is stable     Hyperlipidemia POA  Atrial fibrillation s/p Ablation in Feb 2021  Essential HTN POA  Prior  Xarelto but was discontinued due to GIB. Off Amiodarone per patient  Continue losartan, BP still uncontrolled,   Continue Statin     Alcohol use  1 bottle wine per night, last drink 4 days ago  No withdrawl        _______________________________________________________________________  Patient seen and examined by me on discharge day. Pertinent Findings:  Gen:    Not in distress  Chest: Clear lungs  CVS:   Regular rhythm. No edema  Abd:  Soft, not distended, not tender  Neuro:  Alert, oriented x4  _______________________________________________________________________  DISCHARGE MEDICATIONS:   Current Discharge Medication List      CONTINUE these medications which have NOT CHANGED    Details   losartan (COZAAR) 100 mg tablet Take 100 mg by mouth daily. aspirin delayed-release 81 mg tablet Take 81 mg by mouth daily. Qty:        metFORMIN (GLUCOPHAGE) 500 mg tablet Take 500 mg by mouth daily (with breakfast). multivitamin (ONE A DAY) tablet Take 1 Tab by mouth daily. pantoprazole (PROTONIX) 40 mg tablet Take 40 mg by mouth daily. rosuvastatin (CRESTOR) 10 mg tablet Take 1 Tab by mouth nightly.  Indications: hardening of the arteries due to plaque buildup  Qty: 90 Tab, Refills: 4         STOP taking these medications       amoxicillin-clavulanate (Augmentin) 875-125 mg per tablet Comments:   Reason for Stopping:         trimethoprim-sulfamethoxazole (Bactrim DS) 160-800 mg per tablet Comments:   Reason for Stopping:                 Patient Follow Up Instructions: Activity: Activity as tolerated  Diet: Diabetic Diet  Wound Care: Keep wound clean and dry    Follow-up with PCP and Podiatry in 1-2 weeks.   Follow-up tests/labs per [place] recommendaions  Follow-up Information     Follow up With Specialties Details Why Contact Info    Leo Velarde MD Family Medicine In 1 week  [address] [address]      Brenda Paul DPM Podiatry In 2 weeks  [address] [address].  [phone]          ________________________________________________________________    Risk of deterioration: High    Condition at Discharge:  Stable  __________________________________________________________________    Disposition  Home with family, no needs    ____________________________________________________________________    Code Status: Full Code  ___________________________________________________________________      Total time in minutes spent coordinating this discharge (includes going over instructions, follow-up, prescriptions, and preparing report for sign off to her PCP) :  45 minutes    Signed:  Wen Cruz MD

## 2021-09-30 NOTE — PROGRESS NOTES
Updated note 4:50pm:  Multiple referrals sent out to find an accepting [facility] provider. [facility] agencies either not in network or can not have start of care date until Wednesday of next week. Pt will need [facility] nurse for wound care and line care or be able to go outpatient wound care if no [facility] agency can provide the services. Discharge postponed for this reason. Nursing and attending, Dr. Raven Lisa, informed. Nurse to inform patient and spouse who is present in room. Original Note;  Telephone call from nurse reporting that wound care orders have been placed by Dr. Carlos Robertson. Pt now requiring [facility] skilled nursing to help with wound care and line care for IV antibiotics. Referrals sent to [facility] and [facility] to determine who can accept for care. Waiting on information of acceptance. Devin Freedman, [address] - [address] [place]  [employer] ACP Facilitator  Zone Phone: [phone]

## 2021-09-30 NOTE — TELEPHONE ENCOUNTER
Spoke w/ patient; two patient identifiers confirmed. Informed patient that hospital follow-up has been scheduled for 10/13/2021 at 2:30pm.    Patient verbalized understanding.     Meredith Nuñez LPN

## 2021-09-30 NOTE — TELEPHONE ENCOUNTER
----- Message from Nick Moe MD sent at 9/30/2021 11:17 AM EDT -----  Please schedule to see me in clinic in 2 weeks. Inpatient today, getting discharged.        Nick Moe MD  Infectious Diseases

## 2021-10-01 VITALS
SYSTOLIC BLOOD PRESSURE: 168 MMHG | BODY MASS INDEX: 26.13 KG/M2 | OXYGEN SATURATION: 96 % | DIASTOLIC BLOOD PRESSURE: 77 MMHG | TEMPERATURE: 97.9 F | HEIGHT: 72 IN | HEART RATE: 99 BPM | WEIGHT: 192.9 LBS | RESPIRATION RATE: 17 BRPM

## 2021-10-01 LAB
ANION GAP SERPL CALC-SCNC: 4 MMOL/L (ref 5–15)
BUN SERPL-MCNC: 16 MG/DL (ref 6–20)
BUN/CREAT SERPL: 18 (ref 12–20)
CALCIUM SERPL-MCNC: 8.7 MG/DL (ref 8.5–10.1)
CHLORIDE SERPL-SCNC: 108 MMOL/L (ref 97–108)
CO2 SERPL-SCNC: 24 MMOL/L (ref 21–32)
CREAT SERPL-MCNC: 0.88 MG/DL (ref 0.7–1.3)
GLUCOSE BLD STRIP.AUTO-MCNC: 123 MG/DL (ref 65–117)
GLUCOSE BLD STRIP.AUTO-MCNC: 169 MG/DL (ref 65–117)
GLUCOSE SERPL-MCNC: 118 MG/DL (ref 65–100)
POTASSIUM SERPL-SCNC: 3.8 MMOL/L (ref 3.5–5.1)
SERVICE CMNT-IMP: ABNORMAL
SERVICE CMNT-IMP: ABNORMAL
SODIUM SERPL-SCNC: 136 MMOL/L (ref 136–145)

## 2021-10-01 PROCEDURE — 74011636637 HC RX REV CODE- 636/637: Performed by: PODIATRIST

## 2021-10-01 PROCEDURE — 74011250636 HC RX REV CODE- 250/636: Performed by: STUDENT IN AN ORGANIZED HEALTH CARE EDUCATION/TRAINING PROGRAM

## 2021-10-01 PROCEDURE — 2709999900 HC NON-CHARGEABLE SUPPLY

## 2021-10-01 PROCEDURE — 74011250637 HC RX REV CODE- 250/637: Performed by: PODIATRIST

## 2021-10-01 PROCEDURE — 36415 COLL VENOUS BLD VENIPUNCTURE: CPT

## 2021-10-01 PROCEDURE — 74011000258 HC RX REV CODE- 258: Performed by: STUDENT IN AN ORGANIZED HEALTH CARE EDUCATION/TRAINING PROGRAM

## 2021-10-01 PROCEDURE — 74011250636 HC RX REV CODE- 250/636: Performed by: PODIATRIST

## 2021-10-01 PROCEDURE — 82962 GLUCOSE BLOOD TEST: CPT

## 2021-10-01 PROCEDURE — 74011250637 HC RX REV CODE- 250/637: Performed by: INTERNAL MEDICINE

## 2021-10-01 PROCEDURE — 80048 BASIC METABOLIC PNL TOTAL CA: CPT

## 2021-10-01 RX ADMIN — Medication 100 MG: at 08:22

## 2021-10-01 RX ADMIN — CEFEPIME HYDROCHLORIDE 2 G: 2 INJECTION, POWDER, FOR SOLUTION INTRAVENOUS at 10:32

## 2021-10-01 RX ADMIN — PANTOPRAZOLE SODIUM 40 MG: 40 TABLET, DELAYED RELEASE ORAL at 06:34

## 2021-10-01 RX ADMIN — Medication 10 ML: at 06:35

## 2021-10-01 RX ADMIN — ENOXAPARIN SODIUM 40 MG: 40 INJECTION SUBCUTANEOUS at 08:23

## 2021-10-01 RX ADMIN — AMLODIPINE BESYLATE 2.5 MG: 5 TABLET ORAL at 08:22

## 2021-10-01 RX ADMIN — ASPIRIN 81 MG: 81 TABLET, CHEWABLE ORAL at 08:22

## 2021-10-01 RX ADMIN — CEFEPIME HYDROCHLORIDE 2 G: 2 INJECTION, POWDER, FOR SOLUTION INTRAVENOUS at 02:25

## 2021-10-01 RX ADMIN — Medication 10 ML: at 11:50

## 2021-10-01 RX ADMIN — INSULIN LISPRO 2 UNITS: 100 INJECTION, SOLUTION INTRAVENOUS; SUBCUTANEOUS at 11:47

## 2021-10-01 RX ADMIN — ACETAMINOPHEN 650 MG: 325 TABLET ORAL at 08:22

## 2021-10-01 RX ADMIN — METRONIDAZOLE 500 MG: 500 INJECTION, SOLUTION INTRAVENOUS at 08:23

## 2021-10-01 NOTE — PROGRESS NOTES
End of Shift Note    Bedside shift change report given to Justin Toure (oncoming nurse) by Dianne Jackson RN (offgoing nurse). Report included the following information SBAR, Kardex and MAR    Shift worked:  7pm-7am     Shift summary and any significant changes:     uneventful shift. PICC patent and positive blood return. Dressing intact. Concerns for physician to address:  ? discharge     Zone phone for oncoming shift:          Activity:  Activity Level: Up ad xu  Number times ambulated in hallways past shift: 0  Number of times OOB to chair past shift: 0    Cardiac:   Cardiac Monitoring: No      Cardiac Rhythm: Sinus Rhythm    Access:   Current line(s): PICC     Genitourinary:   Urinary status: voiding    Respiratory:   O2 Device: None (Room air)  Chronic home O2 use?: NO  Incentive spirometer at bedside: YES     GI:  Last Bowel Movement Date: 09/29/21  Current diet:  ADULT DIET Regular; 4 carb choices (60 gm/meal)  Passing flatus: YES  Tolerating current diet: YES       Pain Management:   Patient states pain is manageable on current regimen: YES    Skin:  Lux Score: 21  Interventions: increase time out of bed    Patient Safety:  Fall Score:  Total Score: 1  Interventions: gripper socks       Length of Stay:  Expected LOS: 2d 21h  Actual LOS: [address], RN

## 2021-10-01 NOTE — PROGRESS NOTES
Problem: Falls - Risk of  Goal: *Absence of Falls  Description: Document Jaron Appiah Fall Risk and appropriate interventions in the flowsheet. Outcome: Resolved/Met  Note: Fall Risk Interventions:            Medication Interventions: Teach patient to arise slowly         History of Falls Interventions: Consult care management for discharge planning         Problem: Patient Education: Go to Patient Education Activity  Goal: Patient/Family Education  Outcome: Resolved/Met     Problem: Diabetes Self-Management  Goal: *Disease process and treatment process  Description: Define diabetes and identify own type of diabetes; list 3 options for treating diabetes. Outcome: Resolved/Met  Goal: *Incorporating nutritional management into lifestyle  Description: Describe effect of type, amount and timing of food on blood glucose; list 3 methods for planning meals. Outcome: Resolved/Met  Goal: *Incorporating physical activity into lifestyle  Description: State effect of exercise on blood glucose levels. Outcome: Resolved/Met  Goal: *Developing strategies to promote health/change behavior  Description: Define the ABC's of diabetes; identify appropriate screenings, schedule and personal plan for screenings. Outcome: Resolved/Met  Goal: *Using medications safely  Description: State effect of diabetes medications on diabetes; name diabetes medication taking, action and side effects. Outcome: Resolved/Met  Goal: *Monitoring blood glucose, interpreting and using results  Description: Identify recommended blood glucose targets  and personal targets. Outcome: Resolved/Met  Goal: *Prevention, detection, treatment of acute complications  Description: List symptoms of hyper- and hypoglycemia; describe how to treat low blood sugar and actions for lowering  high blood glucose level.   Outcome: Resolved/Met  Goal: *Prevention, detection and treatment of chronic complications  Description: Define the natural course of diabetes and describe the relationship of blood glucose levels to long term complications of diabetes.   Outcome: Resolved/Met  Goal: *Developing strategies to address psychosocial issues  Description: Describe feelings about living with diabetes; identify support needed and support network  Outcome: Resolved/Met  Goal: *Insulin pump training  Outcome: Resolved/Met  Goal: *Sick day guidelines  Outcome: Resolved/Met  Goal: *Patient Specific Goal (EDIT GOAL, INSERT TEXT)  Outcome: Resolved/Met     Problem: Patient Education: Go to Patient Education Activity  Goal: Patient/Family Education  Outcome: Resolved/Met

## 2021-10-01 NOTE — PROGRESS NOTES
End of Shift Note    Bedside shift change report given to Antwon Garner (oncoming nurse) by Chad Mendieta (offgoing nurse). Report included the following information SBAR, Kardex, Procedure Summary, Intake/Output, MAR and Recent Results    Shift worked:  7a-7p     Shift summary and any significant changes:     Pt given tylenol for knee pain. Up ad xu in room. Tolerating diet. Received scheduled antibiotics. PICC line placed. Antibiotic teaching completed with pt and his wife. Discharge delayed until tomorrow. Concerns for physician to address:       Zone phone for oncoming shift:         Activity:  Activity Level: Up ad xu, Up with Assistance  Number times ambulated in hallways past shift: 0  Number of times OOB to chair past shift: 0    Cardiac:   Cardiac Monitoring: No      Cardiac Rhythm: Sinus Rhythm    Access:   Current line(s): PICC    Genitourinary:   Urinary status: voiding    Respiratory:   O2 Device: None (Room air)  Chronic home O2 use?: NO  Incentive spirometer at bedside: N/A     GI:  Last Bowel Movement Date: 09/29/21  Current diet:  ADULT DIET Regular; 4 carb choices (60 gm/meal)  Passing flatus: YES  Tolerating current diet: YES       Pain Management:   Patient states pain is manageable on current regimen: YES    Skin:  Lux Score: 21  Interventions: increase time out of bed    Patient Safety:  Fall Score:  Total Score: 1  Interventions: gripper socks       Length of Stay:  Expected LOS: 2d 21h  Actual LOS: [address] [facility]

## 2021-10-01 NOTE — DISCHARGE INSTRUCTIONS
Following antibiotics per ID  - Cefepime 2gm q8h, Duration 6 weeks, 21 to 21.   - IV metronidzole 500mg q8h, Duration 4 weeks, 21 to 10/25/21.       Last dose of Metronidazole IV was given at 0900 today,  Due again at 5 pm tonight and then again at 1 am 10/2/21 and every 8 hours. Last dose of Cefepime IV was at 1000 today,  Due again at 6 pm tonight and again at 2 am tomorrow 10/2/21 and every 8 hours. HOSPITALIST DISCHARGE INSTRUCTIONS    NAME: Yesica Iglesias   :  1945   MRN:  [id number]     Date/Time:  2021 11:44 AM    ADMIT DATE: 2021     DISCHARGE DATE: 2021     DISCHARGE DIAGNOSIS:  Right foot osteomyelitis    MEDICATIONS:  · It is important that you take the medication exactly as they are prescribed. · Keep your medication in the bottles provided by the pharmacist and keep a list of the medication names, dosages, and times to be taken in your wallet. · Do not take other medications without consulting your doctor. Pain Management: per above medications    What to do at Home    Recommended diet:  Diabetic Diet    Recommended activity: Activity as tolerated    If you have questions regarding the hospital related prescriptions or hospital related issues please [facility] [phone]. If you experience any of the following symptoms then please call your primary care physician or return to the emergency room if you cannot get hold of your doctor:  Fever, chills, nausea, vomiting, diarrhea, change in mentation, falling, bleeding, shortness of breath    Follow Up:  Dr. Claudia Block MD  you are to call and set up an appointment to see them in 7-10 days. Information obtained by :  I understand that if any problems occur once I am at home I am to contact my physician. I understand and acknowledge receipt of the instructions indicated above. Physician's or R.N.'s Signature                                                                  Date/Time                                                                                                                                              Patient or Representative Signature                                                          Date/Time

## 2021-10-01 NOTE — PROGRESS NOTES
Transition of Care Plan:    *please provide pt w/ 3 days of wound care supplies*     RUR:   14% LOW  Disposition: Home w/ [facility] ([facility]) & f/u appts  Follow up appointments: PCP, Podiatry, Infectious Disease- details in AVS  DME needed: IV abx- [facility] [address] at [address] [facility]; ETA 11:30 AM  Keys or means to access home:  Yes     IM Medicare Letter: N/A  Is patient a BCPI-A Bundle:  No                    If yes, was Bundle Letter given?:   N/A  Caregiver Contact: LNOK/ Wife- Ronald Greer, [phone]  Discharge Caregiver contacted prior to discharge? Yes     CM acknowledged d/c.    Update 10:32 AM  D/c plan discussed during IDR. Pt receiving last dose of IV abx (run time ~30 minutes) & can d/c home after. CM received call from infusion company reporting wife requested an addtl teaching in the home; CM notified infusion company of anticipated time of d/c. CM met w/ pt at [facility] to review d/c re: home w/ HH & f/u appts. CM informed pt he will f/u in office on Monday for dressing change & HH will begin 10/6. Pt verbalized understanding & is agreeable to d/c. Pt reports being very anxious to discharge home. Per pt's request CM spoke w/ pt's wife to review d/c plan. Wife to transport at d/c; ETA 11:30 AM.    If no further needs arise, pt ready to d/c from CM standpoint. Initial note-  CM reviewed pt's chart. Barriers w/ securing home health agency include complex payor & soc. CM discussed d/c plan with attending MD. MD confirmed pt can d/c home today w/ wound care supplies & f/u w/ podiatrist in office for wound care/ dressing change on 10/4. CM secured podiatry appointment for Monday, 10/4 w/ Dr. Ketty Ayala. [facility] accepted pt w/ soc for 10/6. CM notified attending MD & [employer]. Care Management Interventions  PCP Verified by CM: Yes  Palliative Care Criteria Met (RRAT>21 & CHF Dx)?: No  Mode of Transport at Discharge:  Other (see comment) (Wife)  [facility] Transport Time of Discharge: 1130  Transition of Care Consult (CM Consult): [address]: No  Reason Outside [place]: Unable to staff case  Discharge Durable Medical Equipment: No  Physical Therapy Consult: No  Occupational Therapy Consult: No  Support Systems: Spouse/Significant Other  Confirm Follow Up Transport: Family  The Plan for Transition of Care is Related to the Following Treatment Goals : Home w/ [address] ([place]) & f/u appts  The Patient and/or Patient Representative was Provided with a Choice of Provider and Agrees with the Discharge Plan?: Yes  Name of the Patient Representative Who was Provided with a Choice of Provider and Agrees with the Discharge Plan: Wife- Lolita Valentino  Discharge Location  Discharge Placement: Home with home health ([address] (SN) & f/u appts)    HINA Oates  Care Management

## 2021-10-03 ENCOUNTER — TELEPHONE (OUTPATIENT)
Dept: INFECTIOUS DISEASES | Age: 76
End: 2021-10-03

## 2021-10-03 ENCOUNTER — TELEPHONE (OUTPATIENT)
Dept: FAMILY MEDICINE CLINIC | Age: 76
End: 2021-10-03

## 2021-10-03 LAB
BACTERIA SPEC CULT: NORMAL
SERVICE CMNT-IMP: NORMAL

## 2021-10-03 NOTE — TELEPHONE ENCOUNTER
ID  Received perfect serve message to call patient/ spouse. I see Dr. Aury Kay has already responded.

## 2021-10-03 NOTE — TELEPHONE ENCOUNTER
Patient's wife called me in absolute panic about 30 mins before stating that her 's toe is all red and swollen. She said she is unable to get hold of Dr. Rinku Akhtar. I called the patient and he said that the toe has not worsened at all and appears the same as it was when he was discharged a couple of days back. He said they have appointment with Dr. Lance Arcos (podiatrist) for follow up at 11am tomorrow. His wife in the background panicked and was asking me to \"facetime\" him so I could see his wound. I explained to her that is she is so concerned, they need to go to the ED, but the wife kept insisting on \"facetime\". The wife was extremely panicked and shrieking  and I was unable to talk to the patient properly or advice him anything because of this. Patient was trying to calm his wife as well. I tried calling the patient again two more times but was not able to be connected.        Garry Steven MD  Infectious Diseases

## 2021-10-07 ENCOUNTER — TELEPHONE (OUTPATIENT)
Dept: FAMILY MEDICINE CLINIC | Age: 76
End: 2021-10-07

## 2021-10-07 NOTE — TELEPHONE ENCOUNTER
Spoke w/ patient's wife (Elidia); two patient identifiers confirmed. Patient's wife voiced concerns in regards to appoint on 10/13/2021 being at 2:30pm and his infusion being due at 3pm.    Patient's wife states that they may come in around 2pm for appointment that day; informed that clinic schedule is full for that day and no guarantee that we will be able to see any earlier than appointment time. Patient's wife states that she will bring supplies for infusion with them in case appointment runs long or behind time.     Ivonne Pimentel LPN

## 2021-10-07 NOTE — TELEPHONE ENCOUNTER
Patient wife would like to get a call from nurse she has questions regarding her  getting an infusion @ 3:00 she can be reached @ [phone]

## 2021-10-12 ENCOUNTER — TELEPHONE (OUTPATIENT)
Dept: INFECTIOUS DISEASES | Age: 76
End: 2021-10-12

## 2021-10-12 NOTE — TELEPHONE ENCOUNTER
----- Message from Nish Ambrose MD sent at 10/12/2021  2:46 PM EDT -----  Martin castillo,     Please make sure this patient's labs get faxed to us by tomorrow. He is on IV abx by me and has clinic follow up tomorrow with me.      Thanks,   [employer] [facility]

## 2021-10-12 NOTE — TELEPHONE ENCOUNTER
Spoke w/ Elliot Cole at [facility]; two patient identifiers confirmed. Informed that patient has a follow-up appointment scheduled for tomorrow and most recent lab results needed for review at that appointment for review if available. Elliot Cole states that she will reach out to [facility] to request most recent lab results and will fax them to our office once received.     Alec Fuchs LPN

## 2021-10-13 ENCOUNTER — TRANSCRIBE ORDER (OUTPATIENT)
Dept: SCHEDULING | Age: 76
End: 2021-10-13

## 2021-10-13 ENCOUNTER — OFFICE VISIT (OUTPATIENT)
Dept: INFECTIOUS DISEASES | Age: 76
End: 2021-10-13
Payer: COMMERCIAL

## 2021-10-13 VITALS
OXYGEN SATURATION: 99 % | TEMPERATURE: 98.4 F | HEIGHT: 72 IN | WEIGHT: 193 LBS | HEART RATE: 85 BPM | BODY MASS INDEX: 26.14 KG/M2 | RESPIRATION RATE: 16 BRPM | DIASTOLIC BLOOD PRESSURE: 89 MMHG | SYSTOLIC BLOOD PRESSURE: 164 MMHG

## 2021-10-13 DIAGNOSIS — M86.171 ACUTE OSTEOMYELITIS OF RIGHT ANKLE OR FOOT (HCC): Primary | ICD-10-CM

## 2021-10-13 DIAGNOSIS — M86.9 TOE OSTEOMYELITIS, RIGHT (HCC): Primary | ICD-10-CM

## 2021-10-13 PROCEDURE — 99213 OFFICE O/P EST LOW 20 MIN: CPT | Performed by: STUDENT IN AN ORGANIZED HEALTH CARE EDUCATION/TRAINING PROGRAM

## 2021-10-13 PROCEDURE — 1111F DSCHRG MED/CURRENT MED MERGE: CPT | Performed by: STUDENT IN AN ORGANIZED HEALTH CARE EDUCATION/TRAINING PROGRAM

## 2021-10-13 NOTE — PROGRESS NOTES
Identified pt with two pt identifiers(name and ). Reviewed record in preparation for visit and have obtained necessary documentation. Chief Complaint   Patient presents with    Follow-up From Hospital      Vitals:    10/13/21 1432   BP: (!) 164/89   Pulse: 85   Resp: 16   Temp: 98.4 °F (36.9 °C)   TempSrc: Temporal   SpO2: 99%   Weight: 193 lb (87.5 kg)   Height: 6' (1.829 m)   PainSc:   0 - No pain       Health Maintenance Review: Patient reminded of \"due or due soon\" health maintenance. I have asked the patient to contact his/her primary care provider (PCP) for follow-up on his/her health maintenance. Coordination of Care Questionnaire:  :   1) Have you been to an emergency room, urgent care, or hospitalized since your last visit? If yes, where when, and reason for visit? no       2. Have seen or consulted any other health care provider since your last visit? If yes, where when, and reason for visit? NO      Patient is accompanied by self and wife I have received verbal consent from Sera Foster to discuss any/all medical information while they are present in the room.

## 2021-10-14 NOTE — PROGRESS NOTES
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 o. year old male with history of DM2, who presented to the ED on 9/26/2021 for worsening right hallux wound.  Patient reports that an ulcer started on his toe after what he thinks could be from his about a year ago. Brigitte Green had graft placement at the wound on June 30, 2021 as there was no concern for active infection at that time.  Patient reports that the wound has intermittently had some drainage but does not reported to be thick or yellow.  He denies any fevers or chills.  I discussed case with Dr. Allison Echevarria the patient had wound cultures taken on 9/16/2021 which grew Streptococcus pyogenes, Pseudomonas and Enterobacter cloacae. We do not have the reports of this available at this time.  Patient was put on oral Augmentin about a week prior to the current admission; patient was still taking Augmentin just before getting admitted here. MRI right foot 9/25/21 showed osteomyelitis of the right hallux at the proximal and distal phalanges and possible IP joint septic arthritis.  He is status post debridement of the right toe on 9/26/2021.  Pathology and operative cultures grew Bacteroides pyogen - Beta-lactamase positive.  He had been on vancomycin and Zosyn since admission. Per ID recommendations, patient is on cefepime and flagyl IV for this for 6 weeks. Events since discharge:    None acute medically. Subjective:   Patient seen, wife in presence. Patient reports to be doing well overall. Tolerating the cefepime and flagyl both at q8h dosing. He reports the hallux to be stable since discharge - no better or worse. Never had much pain there. Wearing supporting shoes.        Objective:    Vitals:       Physical Exam:  Gen: No apparent distress  HEENT: Normocephalic, atraumatic, no scleral icterus  CV: HDS   Lungs: Clear to auscultation bilaterally, no wheezing  Abdomen: soft, non tender, non distended, no organomegaly appreciated, no CVA tenderness   Genitourinary no ward catheter   Skin: no rash   Psych: good affect, good eye contact, non tearful  Neuro: alert, oriented to time,  place, and situation, moves all extremities to commands, verbal  Musculoskeletal:  Right hallux appears non-inflamed. No tenderness, no acute lesions. Debridement site is healing. No drainage. Lines: PICC         Labs:  No results found for this or any previous visit (from the past 24 hour(s)). Reviewed in paper records. Will be scanned into system. Assessment:    78 y. o. year old male with history of DM2, who presented to the ED on 9/26/2021 for worsening right hallux wound.  Patient reports that an ulcer started on his toe after what he thinks could be from his about a year ago. Gianluca Washington had graft placement at the wound on June 30, 2021 as there was no concern for active infection at that time.  Patient reports that the wound has intermittently had some drainage but does not reported to be thick or yellow.  He denies any fevers or chills.  I discussed case with Dr. Jade Grigsby the patient had wound cultures taken on 9/16/2021 which grew Streptococcus pyogenes, Pseudomonas and Enterobacter cloacae. We do not have the reports of this available at this time.  Patient was put on oral Augmentin about a week prior to the current admission; patient was still taking Augmentin just before getting admitted here. MRI right foot 9/25/21 showed osteomyelitis of the right hallux at the proximal and distal phalanges and possible IP joint septic arthritis.  He is status post debridement of the right toe on 9/26/2021.  Pathology and operative cultures grew Bacteroides pyogen - Beta-lactamase positive.  He had been on vancomycin and Zosyn since admission.     Per ID recommendations, patient is on cefepime 2gm q8h for 6 weeks, till 11/8/21 and Flagyl 500mg q8h for 4 weeks, till 10/25/21. Recommendations:  - Patient is doing well. Labs appear well. Hallux appears the same as it was at discharge. Dr. Tracey Bustillo has ordered another MRI of the foot and I will reach out to him to assess the need for this imaging at this point in the treatment course. - Continue abx as above. - RTC in clinic in 3 weeks.   -Patient's wife wanted to 9 hold definitive can be be with the effectiveness of the antibiotics. I explained to her that the toe does not look acutely inflamed at this time. It does have bone infection underneath and possibly the joint is involved as well, however we can still continue the course of the standard 6 weeks of IV antibiotics at this time and see. We will also continue to monitor the to after the antibiotics have ended. Explained to her that infections can initially heal but then reappear months to even years later and this depends patient to patient and on the aggressiveness of the initial infection. I have explained to them the importance of the control of diabetes in order to optimize wound healing conditions. - All questions answered. RETURN TO OFFICE: 2 weeks. Thank you for the opportunity to participate in the care of this patient. Please contact with questions or concerns.       Rasheed Rubio MD  Infectious Diseases

## 2021-10-15 ENCOUNTER — APPOINTMENT (OUTPATIENT)
Dept: GENERAL RADIOLOGY | Age: 76
DRG: 617 | End: 2021-10-15
Attending: EMERGENCY MEDICINE
Payer: COMMERCIAL

## 2021-10-15 ENCOUNTER — HOSPITAL ENCOUNTER (OUTPATIENT)
Dept: MRI IMAGING | Age: 76
Discharge: HOME | DRG: 617 | End: 2021-10-15
Attending: PODIATRIST
Payer: COMMERCIAL

## 2021-10-15 ENCOUNTER — HOSPITAL ENCOUNTER (INPATIENT)
Age: 76
LOS: 5 days | Discharge: HOME HEALTH | DRG: 617 | End: 2021-10-20
Attending: EMERGENCY MEDICINE | Admitting: INTERNAL MEDICINE
Payer: COMMERCIAL

## 2021-10-15 DIAGNOSIS — M86.9 OSTEOMYELITIS OF GREAT TOE OF RIGHT FOOT (HCC): Primary | ICD-10-CM

## 2021-10-15 DIAGNOSIS — L03.119 CELLULITIS OF FOOT: ICD-10-CM

## 2021-10-15 DIAGNOSIS — M86.171 ACUTE OSTEOMYELITIS OF RIGHT ANKLE OR FOOT (HCC): ICD-10-CM

## 2021-10-15 LAB
ALBUMIN SERPL-MCNC: 3.4 G/DL (ref 3.5–5)
ALBUMIN/GLOB SERPL: 0.8 {RATIO} (ref 1.1–2.2)
ALP SERPL-CCNC: 126 U/L (ref 45–117)
ALT SERPL-CCNC: 59 U/L (ref 12–78)
ANION GAP SERPL CALC-SCNC: 7 MMOL/L (ref 5–15)
AST SERPL-CCNC: 74 U/L (ref 15–37)
BASOPHILS # BLD: 0 K/UL (ref 0–0.1)
BASOPHILS NFR BLD: 0 % (ref 0–1)
BILIRUB SERPL-MCNC: 0.7 MG/DL (ref 0.2–1)
BUN SERPL-MCNC: 21 MG/DL (ref 6–20)
BUN/CREAT SERPL: 25 (ref 12–20)
CALCIUM SERPL-MCNC: 9.4 MG/DL (ref 8.5–10.1)
CHLORIDE SERPL-SCNC: 108 MMOL/L (ref 97–108)
CO2 SERPL-SCNC: 23 MMOL/L (ref 21–32)
CREAT SERPL-MCNC: 0.83 MG/DL (ref 0.7–1.3)
CRP SERPL-MCNC: <0.29 MG/DL (ref 0–0.6)
DIFFERENTIAL METHOD BLD: ABNORMAL
EOSINOPHIL # BLD: 0.1 K/UL (ref 0–0.4)
EOSINOPHIL NFR BLD: 1 % (ref 0–7)
ERYTHROCYTE [DISTWIDTH] IN BLOOD BY AUTOMATED COUNT: 14.5 % (ref 11.5–14.5)
ERYTHROCYTE [SEDIMENTATION RATE] IN BLOOD: 49 MM/HR (ref 0–20)
GLOBULIN SER CALC-MCNC: 4.5 G/DL (ref 2–4)
GLUCOSE BLD STRIP.AUTO-MCNC: 106 MG/DL (ref 65–117)
GLUCOSE SERPL-MCNC: 112 MG/DL (ref 65–100)
HCT VFR BLD AUTO: 34.1 % (ref 36.6–50.3)
HGB BLD-MCNC: 10.9 G/DL (ref 12.1–17)
IMM GRANULOCYTES # BLD AUTO: 0.1 K/UL (ref 0–0.04)
IMM GRANULOCYTES NFR BLD AUTO: 1 % (ref 0–0.5)
INR PPP: 1.2 (ref 0.9–1.1)
LACTATE BLD-SCNC: 1.33 MMOL/L (ref 0.4–2)
LYMPHOCYTES # BLD: 0.8 K/UL (ref 0.8–3.5)
LYMPHOCYTES NFR BLD: 14 % (ref 12–49)
MCH RBC QN AUTO: 27.2 PG (ref 26–34)
MCHC RBC AUTO-ENTMCNC: 32 G/DL (ref 30–36.5)
MCV RBC AUTO: 85 FL (ref 80–99)
MONOCYTES # BLD: 0.6 K/UL (ref 0–1)
MONOCYTES NFR BLD: 10 % (ref 5–13)
NEUTS SEG # BLD: 4.1 K/UL (ref 1.8–8)
NEUTS SEG NFR BLD: 74 % (ref 32–75)
NRBC # BLD: 0 K/UL (ref 0–0.01)
NRBC BLD-RTO: 0 PER 100 WBC
PLATELET # BLD AUTO: 99 K/UL (ref 150–400)
PMV BLD AUTO: 9.9 FL (ref 8.9–12.9)
POTASSIUM SERPL-SCNC: 3.8 MMOL/L (ref 3.5–5.1)
PROT SERPL-MCNC: 7.9 G/DL (ref 6.4–8.2)
PROTHROMBIN TIME: 12.3 SEC (ref 9–11.1)
RBC # BLD AUTO: 4.01 M/UL (ref 4.1–5.7)
RBC MORPH BLD: ABNORMAL
SERVICE CMNT-IMP: NORMAL
SODIUM SERPL-SCNC: 138 MMOL/L (ref 136–145)
WBC # BLD AUTO: 5.7 K/UL (ref 4.1–11.1)

## 2021-10-15 PROCEDURE — 71046 X-RAY EXAM CHEST 2 VIEWS: CPT

## 2021-10-15 PROCEDURE — 85610 PROTHROMBIN TIME: CPT

## 2021-10-15 PROCEDURE — 80053 COMPREHEN METABOLIC PANEL: CPT

## 2021-10-15 PROCEDURE — 85025 COMPLETE CBC W/AUTO DIFF WBC: CPT

## 2021-10-15 PROCEDURE — 82962 GLUCOSE BLOOD TEST: CPT

## 2021-10-15 PROCEDURE — 93005 ELECTROCARDIOGRAM TRACING: CPT

## 2021-10-15 PROCEDURE — 86140 C-REACTIVE PROTEIN: CPT

## 2021-10-15 PROCEDURE — 36415 COLL VENOUS BLD VENIPUNCTURE: CPT

## 2021-10-15 PROCEDURE — 74011000258 HC RX REV CODE- 258: Performed by: INTERNAL MEDICINE

## 2021-10-15 PROCEDURE — 65270000029 HC RM PRIVATE

## 2021-10-15 PROCEDURE — 74011250636 HC RX REV CODE- 250/636: Performed by: INTERNAL MEDICINE

## 2021-10-15 PROCEDURE — 74011250637 HC RX REV CODE- 250/637: Performed by: INTERNAL MEDICINE

## 2021-10-15 PROCEDURE — 73718 MRI LOWER EXTREMITY W/O DYE: CPT

## 2021-10-15 PROCEDURE — 87040 BLOOD CULTURE FOR BACTERIA: CPT

## 2021-10-15 PROCEDURE — 83605 ASSAY OF LACTIC ACID: CPT

## 2021-10-15 PROCEDURE — 85652 RBC SED RATE AUTOMATED: CPT

## 2021-10-15 PROCEDURE — 99284 EMERGENCY DEPT VISIT MOD MDM: CPT

## 2021-10-15 RX ORDER — DEXTROSE 50 % IN WATER (D50W) INTRAVENOUS SYRINGE
12.5-25 AS NEEDED
Status: DISCONTINUED | OUTPATIENT
Start: 2021-10-15 | End: 2021-10-16

## 2021-10-15 RX ORDER — VANCOMYCIN 2 GRAM/500 ML IN 0.9 % SODIUM CHLORIDE INTRAVENOUS
2000
Status: ACTIVE | OUTPATIENT
Start: 2021-10-15 | End: 2021-10-16

## 2021-10-15 RX ORDER — VANCOMYCIN HYDROCHLORIDE
1250 EVERY 12 HOURS
Status: DISCONTINUED | OUTPATIENT
Start: 2021-10-16 | End: 2021-10-16

## 2021-10-15 RX ORDER — PANTOPRAZOLE SODIUM 40 MG/1
40 TABLET, DELAYED RELEASE ORAL DAILY
Status: DISCONTINUED | OUTPATIENT
Start: 2021-10-16 | End: 2021-10-16

## 2021-10-15 RX ORDER — ACETAMINOPHEN 650 MG/1
650 SUPPOSITORY RECTAL
Status: DISCONTINUED | OUTPATIENT
Start: 2021-10-15 | End: 2021-10-16

## 2021-10-15 RX ORDER — ASPIRIN 81 MG/1
81 TABLET ORAL DAILY
Status: DISCONTINUED | OUTPATIENT
Start: 2021-10-16 | End: 2021-10-16

## 2021-10-15 RX ORDER — ROSUVASTATIN CALCIUM 10 MG/1
10 TABLET, COATED ORAL
Status: DISCONTINUED | OUTPATIENT
Start: 2021-10-15 | End: 2021-10-16

## 2021-10-15 RX ORDER — LOSARTAN POTASSIUM 50 MG/1
100 TABLET ORAL DAILY
Status: DISCONTINUED | OUTPATIENT
Start: 2021-10-16 | End: 2021-10-15

## 2021-10-15 RX ORDER — MAGNESIUM SULFATE 100 %
4 CRYSTALS MISCELLANEOUS AS NEEDED
Status: DISCONTINUED | OUTPATIENT
Start: 2021-10-15 | End: 2021-10-16

## 2021-10-15 RX ORDER — LABETALOL HYDROCHLORIDE 5 MG/ML
10 INJECTION, SOLUTION INTRAVENOUS
Status: DISCONTINUED | OUTPATIENT
Start: 2021-10-15 | End: 2021-10-16

## 2021-10-15 RX ORDER — ACETAMINOPHEN 325 MG/1
650 TABLET ORAL
Status: DISCONTINUED | OUTPATIENT
Start: 2021-10-15 | End: 2021-10-16

## 2021-10-15 RX ORDER — SODIUM CHLORIDE 0.9 % (FLUSH) 0.9 %
5-40 SYRINGE (ML) INJECTION EVERY 8 HOURS
Status: DISCONTINUED | OUTPATIENT
Start: 2021-10-15 | End: 2021-10-15 | Stop reason: SDUPTHER

## 2021-10-15 RX ORDER — SODIUM CHLORIDE 0.9 % (FLUSH) 0.9 %
5-40 SYRINGE (ML) INJECTION AS NEEDED
Status: DISCONTINUED | OUTPATIENT
Start: 2021-10-15 | End: 2021-10-16

## 2021-10-15 RX ORDER — ONDANSETRON 4 MG/1
4 TABLET, ORALLY DISINTEGRATING ORAL
Status: DISCONTINUED | OUTPATIENT
Start: 2021-10-15 | End: 2021-10-16

## 2021-10-15 RX ORDER — SODIUM CHLORIDE 0.9 % (FLUSH) 0.9 %
5-40 SYRINGE (ML) INJECTION AS NEEDED
Status: DISCONTINUED | OUTPATIENT
Start: 2021-10-15 | End: 2021-10-15 | Stop reason: SDUPTHER

## 2021-10-15 RX ORDER — INSULIN LISPRO 100 [IU]/ML
INJECTION, SOLUTION INTRAVENOUS; SUBCUTANEOUS
Status: DISCONTINUED | OUTPATIENT
Start: 2021-10-15 | End: 2021-10-16

## 2021-10-15 RX ORDER — LOSARTAN POTASSIUM 100 MG/1
100 TABLET ORAL DAILY
Status: DISCONTINUED | OUTPATIENT
Start: 2021-10-15 | End: 2021-10-16

## 2021-10-15 RX ORDER — SODIUM CHLORIDE 0.9 % (FLUSH) 0.9 %
5-40 SYRINGE (ML) INJECTION EVERY 8 HOURS
Status: DISCONTINUED | OUTPATIENT
Start: 2021-10-15 | End: 2021-10-16

## 2021-10-15 RX ORDER — HEPARIN 100 UNIT/ML
300 SYRINGE INTRAVENOUS AS NEEDED
Status: DISCONTINUED | OUTPATIENT
Start: 2021-10-15 | End: 2021-10-16

## 2021-10-15 RX ORDER — ONDANSETRON 2 MG/ML
4 INJECTION INTRAMUSCULAR; INTRAVENOUS
Status: DISCONTINUED | OUTPATIENT
Start: 2021-10-15 | End: 2021-10-16

## 2021-10-15 RX ORDER — POLYETHYLENE GLYCOL 3350 17 G/17G
17 POWDER, FOR SOLUTION ORAL DAILY PRN
Status: DISCONTINUED | OUTPATIENT
Start: 2021-10-15 | End: 2021-10-16

## 2021-10-15 RX ADMIN — LOSARTAN POTASSIUM 100 MG: 100 TABLET, FILM COATED ORAL at 19:59

## 2021-10-15 RX ADMIN — Medication 10 ML: at 22:32

## 2021-10-15 RX ADMIN — Medication 300 UNITS: at 22:31

## 2021-10-15 RX ADMIN — ROSUVASTATIN CALCIUM 10 MG: 10 TABLET, COATED ORAL at 22:00

## 2021-10-15 RX ADMIN — PIPERACILLIN AND TAZOBACTAM 3.38 G: 3; .375 INJECTION, POWDER, LYOPHILIZED, FOR SOLUTION INTRAVENOUS at 19:59

## 2021-10-15 NOTE — H&P
Hospitalist Admission Note    NAME: Cayetano Current   :  1945   MRN:  [id number]     Date/Time:  10/15/2021 7:43 PM    Patient PCP: Vic Schwab, MD  ______________________________________________________________________  Given the patient's current clinical presentation, I have a high level of concern for decompensation if discharged from the emergency department. Complex decision making was performed, which includes reviewing the patient's available past medical records, laboratory results, and x-ray films. My assessment of this patient's clinical condition and my plan of care is as follows. Assessment / Plan:  Right great toe OM, diabetic foot infection POA  Possible septic tenosynovitis within flexor hallucis longus     -recent admission 2021 for right first great toe OM, was sent home with IV antibiotics, MRI foot showed worsening of infection  -Dr Jeannie Mims sent to ED for admission for amputation tomorrow  -N.p.o. after midnight  -He was getting cefepime and Flagyl at home  -Placed on vancomycin and IV Zosyn  -Consult ID  -Follow blood culture    DM II  -A1c 6.5 , 2021, no need to repeat again  -A1c at goal  SSI while here  -Hold Metformin      Severe aortic Stenosis severe with moderate AI POA  s/p TAVR 3/24/21 w/ Dr. Alicia Myers and Dr. Sudarshan Chen. Hyperlipidemia POA  Atrial fibrillation s/p Ablation in 2021  Essential HTN POA  -Currently NSR  Continue losartan,  Continue Statin     Code Status: Full code  Surrogate Decision Maker wife    DVT Prophylaxis: SCD  GI Prophylaxis: not indicated    Baseline:       Subjective:   CHIEF COMPLAINT: right foot infection    HISTORY OF PRESENT ILLNESS:     Cayetano Current is a 78 y.o.    male with a past medical history of right toe diabetic foot infection with osteomyelitis, who is currently on cefepime and metronidazole IV, also has history of severe aortic stenosis s/p TAVR, hypertension, history of A. fib, hypertension, alcohol use, he was discharged from the hospital in 2021 for osteomyelitis. He was seen by podiatry, he had MRI done which showed worsening of osteomyelitis, was sent here for right great toe amputation. He denies any complaints. Wife at the bedside    We were asked to admit for work up and evaluation of the above problems. Past Medical History:   Diagnosis Date    Aortic regurgitation     Aortic stenosis     Atrial fibrillation (HCC)     Colon polyps     Diabetes mellitus, type 2 (HCC)     GI bleed     HTN (hypertension)         Past Surgical History:   Procedure Laterality Date    COLONOSCOPY N/A 3/16/2021    COLONOSCOPY performed by Jessi M[facility] at [facility]OSCOPY    HX AFIB ABLATION  2021       Social History     Tobacco Use    Smoking status: Former Smoker     Types: Cigarettes     Quit date:      Years since quittin.8    Smokeless tobacco: Never Used   Substance Use Topics    Alcohol use: Not on file        History reviewed. No pertinent family history. No Known Allergies     Prior to Admission medications    Medication Sig Start Date End Date Taking? Authorizing Provider   losartan (COZAAR) 100 mg tablet Take 100 mg by mouth daily. Provider, Historical   aspirin delayed-release 81 mg tablet Take 81 mg by mouth daily. 3/26/21   Fabrizio Jacob NP   metFORMIN (GLUCOPHAGE) 500 mg tablet Take 500 mg by mouth daily (with breakfast). Provider, Historical   multivitamin (ONE A DAY) tablet Take 1 Tab by mouth daily. Provider, Historical   pantoprazole (PROTONIX) 40 mg tablet Take 40 mg by mouth daily. Provider, Historical   rosuvastatin (CRESTOR) 10 mg tablet Take 1 Tab by mouth nightly. Indications: hardening of the arteries due to plaque buildup 3/9/21   Jaelyn Rodriguez MD       REVIEW OF SYSTEMS:     I am not able to complete the review of systems because:    The patient is intubated and sedated    The patient has altered mental status due to his acute medical problems    The patient has baseline aphasia from prior stroke(s)    The patient has baseline dementia and is not reliable historian    The patient is in acute medical distress and unable to provide information           Total of 12 systems reviewed as follows:       POSITIVE= underlined text  Negative = text not underlined  General:  fever, chills, sweats, generalized weakness, weight loss/gain,      loss of appetite   Eyes:    blurred vision, eye pain, loss of vision, double vision  ENT:    rhinorrhea, pharyngitis   Respiratory:   cough, sputum production, SOB, MANCIA, wheezing, pleuritic pain   Cardiology:   chest pain, palpitations, orthopnea, PND, edema, syncope   Gastrointestinal:  abdominal pain , N/V, diarrhea, dysphagia, constipation, bleeding   Genitourinary:  frequency, urgency, dysuria, hematuria, incontinence   Muskuloskeletal :  arthralgia, myalgia, back pain  Hematology:  easy bruising, nose or gum bleeding, lymphadenopathy   Dermatological: rash, ulceration, pruritis, color change / jaundice  Endocrine:   hot flashes or polydipsia   Neurological:  headache, dizziness, confusion, focal weakness, paresthesia,     Speech difficulties, memory loss, gait difficulty  Psychological: Feelings of anxiety, depression, agitation    Objective:   VITALS:    Visit Vitals  BP (!) 182/136 (BP 1 Location: Left arm, BP Patient Position: At rest)   Pulse 99   Temp 98.6 °F (37 °C)   Resp 18   Ht 6' (1.829 m)   Wt 86 kg (189 lb 9.5 oz)   SpO2 94%   BMI 25.71 kg/m²       PHYSICAL EXAM:    General:    Alert, cooperative, no distress, appears stated age. HEENT: Atraumatic, anicteric sclerae, pink conjunctivae  Neck:  Supple, symmetrical,  thyroid: non tender  Lungs:   Clear to auscultation bilaterally. No Wheezing or Rhonchi. No rales. Chest wall:  No tenderness  No Accessory muscle use. Heart:   Regular  rhythm,  No  murmur   No edema  Abdomen:   Soft, non-tender. Not distended.   Bowel sounds normal  Extremities: No cyanosis. No clubbing,    Skin:     Not pale. Not Jaundiced  No rashes   Psych:  Good insight. Not depressed. Not anxious or agitated. Neurologic: EOMs intact. No facial asymmetry. Right foot examined by podiatry earlier. _______________________________________________________________________  Care Plan discussed with:    Comments   Patient x    Family      RN x    Care Manager                    Consultant:      _______________________________________________________________________  Expected  Disposition:   Home with Family x   HH/PT/OT/RN    SNF/LTC    [facility]    ________________________________________________________________________  TOTAL TIME:  23 Minutes    Critical Care Provided     Minutes non procedure based      Comments     Reviewed previous records   >50% of visit spent in counseling and coordination of care  Discussion with patient and/or family and questions answered       ________________________________________________________________________  Signed: Aldo Victoria MD    Procedures: see electronic medical records for all procedures/Xrays and details which were not copied into this note but were reviewed prior to creation of Plan. LAB DATA REVIEWED:    Recent Results (from the past 24 hour(s))   CBC WITH AUTOMATED DIFF    Collection Time: 10/15/21  6:17 PM   Result Value Ref Range    WBC 5.7 4.1 - 11.1 K/uL    RBC 4.01 (L) 4.10 - 5.70 M/uL    HGB 10.9 (L) 12.1 - 17.0 g/dL    HCT 34.1 (L) 36.6 - 50.3 %    MCV 85.0 80.0 - 99.0 FL    MCH 27.2 26.0 - 34.0 PG    MCHC 32.0 30.0 - 36.5 g/dL    RDW 14.5 11.5 - 14.5 %    PLATELET 99 (L) 335 - 400 K/uL    MPV 9.9 8.9 - 12.9 FL    NRBC 0.0 0  WBC    ABSOLUTE NRBC 0.00 0.00 - 0.01 K/uL    NEUTROPHILS 74 32 - 75 %    LYMPHOCYTES 14 12 - 49 %    MONOCYTES 10 5 - 13 %    EOSINOPHILS 1 0 - 7 %    BASOPHILS 0 0 - 1 %    IMMATURE GRANULOCYTES 1 (H) 0.0 - 0.5 %    ABS. NEUTROPHILS 4.1 1.8 - 8.0 K/UL    ABS.  LYMPHOCYTES 0.8 0.8 - 3.5 K/UL    ABS. MONOCYTES 0.6 0.0 - 1.0 K/UL    ABS. EOSINOPHILS 0.1 0.0 - 0.4 K/UL    ABS. BASOPHILS 0.0 0.0 - 0.1 K/UL    ABS. IMM. GRANS. 0.1 (H) 0.00 - 0.04 K/UL    DF SMEAR SCANNED      RBC COMMENTS NORMOCYTIC, NORMOCHROMIC     METABOLIC PANEL, COMPREHENSIVE    Collection Time: 10/15/21  6:17 PM   Result Value Ref Range    Sodium 138 136 - 145 mmol/L    Potassium 3.8 3.5 - 5.1 mmol/L    Chloride 108 97 - 108 mmol/L    CO2 23 21 - 32 mmol/L    Anion gap 7 5 - 15 mmol/L    Glucose 112 (H) 65 - 100 mg/dL    BUN 21 (H) 6 - 20 MG/DL    Creatinine 0.83 0.70 - 1.30 MG/DL    BUN/Creatinine ratio 25 (H) 12 - 20      GFR est AA >60 >60 ml/min/1.73m2    GFR est non-AA >60 >60 ml/min/1.73m2    Calcium 9.4 8.5 - 10.1 MG/DL    Bilirubin, total 0.7 0.2 - 1.0 MG/DL    ALT (SGPT) 59 12 - 78 U/L    AST (SGOT) 74 (H) 15 - 37 U/L    Alk.  phosphatase 126 (H) 45 - 117 U/L    Protein, total 7.9 6.4 - 8.2 g/dL    Albumin 3.4 (L) 3.5 - 5.0 g/dL    Globulin 4.5 (H) 2.0 - 4.0 g/dL    A-G Ratio 0.8 (L) 1.1 - 2.2     PROTHROMBIN TIME + INR    Collection Time: 10/15/21  6:17 PM   Result Value Ref Range    INR 1.2 (H) 0.9 - 1.1      Prothrombin time 12.3 (H) 9.0 - 11.1 sec   SED RATE (ESR)    Collection Time: 10/15/21  6:17 PM   Result Value Ref Range    Sed rate, automated 49 (H) 0 - 20 mm/hr   POC LACTIC ACID    Collection Time: 10/15/21  6:25 PM   Result Value Ref Range    Lactic Acid (POC) 1.33 0.40 - 2.00 mmol/L

## 2021-10-15 NOTE — CONSULTS
Podiatry Consult    Subjective: Patient has 1 year Hx of non-healing wound right hallux treated by another doctor. At his previous admission I debrided the wound and bone and he was discharged with [facility] and IV ABX directed by Dr. Cassi Lopez. A repeat MRI was ordered recently due to lack of improvement, and the patient was asked to return for admission when significant adverse changes were seen on the images. Date of Consultation: October 15, 2021     Referring Physician: Dr. Tiffany Amaral MD    Patient is a 78 y.o.  male who is being seen for osteomyelitis right hallux. Patient Active Problem List    Diagnosis Date Noted    Diabetic foot ([address] 2021    S/P TAVR (transcatheter aortic valve replacement) 2021    Aortic stenosis 2021    Severe aortic stenosis 2021    GIB (gastrointestinal bleeding) 2021     Past Medical History:   Diagnosis Date    Aortic regurgitation     Aortic stenosis     Atrial fibrillation (HCC)     Colon polyps     Diabetes mellitus, type 2 (HCC)     GI bleed     HTN (hypertension)       Past Surgical History:   Procedure Laterality Date    COLONOSCOPY N/A 3/16/2021    COLONOSCOPY performed by Talmage Hashimoto., MD at [facility] ENDOSCOPY    HX AFIB ABLATION  2021      No family history on file.    Social History     Tobacco Use    Smoking status: Former Smoker     Types: Cigarettes     Quit date:      Years since quittin.8    Smokeless tobacco: Never Used   Substance Use Topics    Alcohol use: Not on file     Current Facility-Administered Medications   Medication Dose Route Frequency Provider Last Rate Last Admin    sodium chloride (NS) flush 5-40 mL  5-40 mL IntraVENous Q8H Refugia Mems, DO        sodium chloride (NS) flush 5-40 mL  5-40 mL IntraVENous PRN Refugia Mems, DO        vancomycin (VANCOCIN) 2000 mg in  ml infusion  2,000 mg IntraVENous NOW Refugia Mems, DO        piperacillin-tazobactam (ZOSYN) 3.375 g in 0.9% sodium chloride (MBP/ADV) 100 mL MBP  3.375 g IntraVENous NOW Gabriela Square, DO         Current Outpatient Medications   Medication Sig Dispense Refill    losartan (COZAAR) 100 mg tablet Take 100 mg by mouth daily.  aspirin delayed-release 81 mg tablet Take 81 mg by mouth daily.  metFORMIN (GLUCOPHAGE) 500 mg tablet Take 500 mg by mouth daily (with breakfast).  multivitamin (ONE A DAY) tablet Take 1 Tab by mouth daily.  pantoprazole (PROTONIX) 40 mg tablet Take 40 mg by mouth daily.  rosuvastatin (CRESTOR) 10 mg tablet Take 1 Tab by mouth nightly. Indications: hardening of the arteries due to plaque buildup 90 Tab 4      No Known Allergies     Review of Systems:  AO X4. NAD. Denies fever, chills, nausea or vomiting. Objective:     Patient Vitals for the past 8 hrs:   BP Temp Pulse Resp SpO2 Height Weight   10/15/21 1744 (!) 182/136 98.6 °F (37 °C) 99 18 94 % 6' (1.829 m) 86 kg (189 lb 9.5 oz)     Temp (24hrs), Av.6 °F (37 °C), Min:98.6 °F (37 °C), Max:98.6 °F (37 °C)      Physical Exam Lower Extremities:    Increased erythema and edema right hallux since patient last seen 2 days ago in my office. Partially open wound plantar right hallux at the level of the IPJ; no drainage. DP and PT 2/4; CFT less than 3 seconds  Sensation absent to fine touch at the level of the toes. MRI right foot: New fluid collection surrounding the hallux IPJ. New pathologic Fx of the right hallux. Increased involvement of hallux proximal phalanx with signal changes consistent with osteomyelitis. Nex, complex fluid collection of the long flexor tendon right hallux consistent with infectious tenosynovitis.     Lab Review:   Recent Results (from the past 24 hour(s))   CBC WITH AUTOMATED DIFF    Collection Time: 10/15/21  6:17 PM   Result Value Ref Range    WBC 5.7 4.1 - 11.1 K/uL    RBC 4.01 (L) 4.10 - 5.70 M/uL    HGB 10.9 (L) 12.1 - 17.0 g/dL    HCT 34.1 (L) 36.6 - 50.3 %    MCV 85.0 80.0 - 99.0 FL    MCH 27.2 26.0 - 34.0 PG    MCHC 32.0 30.0 - 36.5 g/dL    RDW 14.5 11.5 - 14.5 %    PLATELET 99 (L) 402 - 400 K/uL    MPV 9.9 8.9 - 12.9 FL    NRBC 0.0 0  WBC    ABSOLUTE NRBC 0.00 0.00 - 0.01 K/uL    NEUTROPHILS PENDING %    LYMPHOCYTES PENDING %    MONOCYTES PENDING %    EOSINOPHILS PENDING %    BASOPHILS PENDING %    IMMATURE GRANULOCYTES PENDING %    ABS. NEUTROPHILS PENDING K/UL    ABS. LYMPHOCYTES PENDING K/UL    ABS. MONOCYTES PENDING K/UL    ABS. EOSINOPHILS PENDING K/UL    ABS. BASOPHILS PENDING K/UL    ABS. IMM. GRANS. PENDING K/UL    DF PENDING    POC LACTIC ACID    Collection Time: 10/15/21  6:25 PM   Result Value Ref Range    Lactic Acid (POC) 1.33 0.40 - 2.00 mmol/L       Impression:   1. Osteomyelitis right hallux  2. New onset of right hallux infectious tenosynovitis    Recommendation:   I reviewed the MRI findings with the patient. The osteomyelitis does not appear to be controlled, and the infectious tenosynovitis is very concerning because it can lead to rapid proximal spread of the infection. I recommended amputation of the right hallux, and the patient agrees. He is scheduled for Sx 10 AM on 10/16. He may have a diet now, but NPO after midnight.

## 2021-10-15 NOTE — ED PROVIDER NOTES
EMERGENCY DEPARTMENT HISTORY AND PHYSICAL EXAM      Date: 10/15/2021  Patient Name: Su Griffin    History of Presenting Illness     Chief Complaint   Patient presents with    Toe Pain     PT comes from a peoditry for toe amputation. Set to schedule srugery for AM based on MRI results. History Provided By: Patient    HPI: Su Griffin, 78 y.o. male presents to the ED with cc of toe amputation. Pt followed by Podiatry he has had issues with his right great toe. He denies any pain in thee foot. Right great toe infection now severe and pt has MRI for osteomyelitis and needs amputation. He denies fever/chills. He denies CP, SOA, cough. There has been no abd pain, n/v/d. There are no other complaints, changes, or physical findings at this time. PCP: Ramila Dobbins MD    No current facility-administered medications on file prior to encounter. Current Outpatient Medications on File Prior to Encounter   Medication Sig Dispense Refill    losartan (COZAAR) 100 mg tablet Take 100 mg by mouth daily.  aspirin delayed-release 81 mg tablet Take 81 mg by mouth daily.  metFORMIN (GLUCOPHAGE) 500 mg tablet Take 500 mg by mouth daily (with breakfast).  multivitamin (ONE A DAY) tablet Take 1 Tab by mouth daily.  pantoprazole (PROTONIX) 40 mg tablet Take 40 mg by mouth daily.  rosuvastatin (CRESTOR) 10 mg tablet Take 1 Tab by mouth nightly.  Indications: hardening of the arteries due to plaque buildup 90 Tab 4       Past History     Past Medical History:  Past Medical History:   Diagnosis Date    Aortic regurgitation     Aortic stenosis     Atrial fibrillation (HCC)     Colon polyps     Diabetes mellitus, type 2 (HCC)     GI bleed     HTN (hypertension)        Past Surgical History:  Past Surgical History:   Procedure Laterality Date    COLONOSCOPY N/A 3/16/2021    COLONOSCOPY performed by Mathieu Maier MD at [facility] ENDOSCOPY    HX AFIB ABLATION  02/2021       Family History:  No family history on file. Social History:  Social History     Tobacco Use    Smoking status: Former Smoker     Types: Cigarettes     Quit date: 2000     Years since quittin.8    Smokeless tobacco: Never Used   Substance Use Topics    Alcohol use: Not on file    Drug use: Not on file       Allergies:  No Known Allergies      Review of Systems   Review of Systems   Constitutional: Negative. Negative for appetite change, chills, fatigue and fever. HENT: Negative. Negative for congestion, rhinorrhea, sinus pressure and sore throat. Eyes: Negative. Respiratory: Negative. Negative for cough, choking, chest tightness, shortness of breath and wheezing. Cardiovascular: Negative. Negative for chest pain, palpitations and leg swelling. Gastrointestinal: Negative for abdominal pain, constipation, diarrhea, nausea and vomiting. Endocrine: Negative. Genitourinary: Negative. Negative for difficulty urinating, dysuria, flank pain and urgency. Musculoskeletal:        Right great toe infection   Skin: Negative. Neurological: Negative. Negative for dizziness, speech difficulty, weakness, light-headedness, numbness and headaches. Psychiatric/Behavioral: Negative. All other systems reviewed and are negative. Physical Exam   Physical Exam  Vitals and nursing note reviewed. Constitutional:       General: He is not in acute distress. Appearance: Normal appearance. He is well-developed. He is not diaphoretic. HENT:      Head: Normocephalic and atraumatic. Mouth/Throat:      Mouth: Mucous membranes are moist.      Pharynx: No oropharyngeal exudate. Eyes:      Extraocular Movements: Extraocular movements intact. Conjunctiva/sclera: Conjunctivae normal.      Pupils: Pupils are equal, round, and reactive to light. Neck:      Vascular: No JVD. Trachea: No tracheal deviation. Cardiovascular:      Rate and Rhythm: Normal rate and regular rhythm. Heart sounds:  No murmur heard. Pulmonary:      Effort: Pulmonary effort is normal. No respiratory distress. Breath sounds: Normal breath sounds. No stridor. No wheezing or rales. Abdominal:      General: There is no distension. Palpations: Abdomen is soft. Musculoskeletal:         General: No tenderness. Normal range of motion. Cervical back: Normal range of motion and neck supple. Comments: Foot in current dressing by Dr. Chanell Jarrell, defer foot exam no leg edema     Skin:     General: Skin is warm and dry. Capillary Refill: Capillary refill takes less than 2 seconds. Neurological:      Mental Status: He is alert and oriented to person, place, and time. Cranial Nerves: No cranial nerve deficit. Comments: No gross motor or sensory deficits    Psychiatric:         Mood and Affect: Mood normal.         Behavior: Behavior normal.         Diagnostic Study Results     Labs -        POC LACTIC ACID (Final result)   Component (Lab Inquiry)  Collection Time Result Time [facility]   10/15/21 18:25:00 10/15/21 18:27:49 1.33         Final result                          Contains abnormal data CBC WITH AUTOMATED DIFF (Final result)   Component (Lab Inquiry)  Collection Time Result Time WBC RBC HGB HCT MCV   10/15/21 18:17:00 10/15/21 19:11:46 5.7 4. 01Low  10.9Low  34. 1Low  85. 0   INVESTIGATED PER DELTA CHECK PROTOCOL       Collection Time Result Time MCH MCHC RDW PLT MPLV   10/15/21 18:17:00 10/15/21 19:11:46 27.2 32.0 14.5 99Low  9.9       Collection Time Result Time NRBC ANRBC GRANS LYMPH MONOS   10/15/21 18:17:00 10/15/21 19:11:46 0.0 0.00 74 14 10       Collection Time Result Time EOS BASOS IG ABG ABL   10/15/21 18:17:00 10/15/21 19:11:46 1 0 1High  4.1 0.8       Collection Time Result Time ABM CELESTE ABB AIG DF   10/15/21 18:17:00 10/15/21 19:11:46 0.6 0.1 0.0 0.1High  SMEAR SCANNED       Collection Time Result Time RCOM   10/15/21 18:17:00 10/15/21 19:11:46 NORMOCYTIC, NORMOCHROMIC       Final result         Contains abnormal data METABOLIC PANEL, COMPREHENSIVE (Final result)   Component (Lab Inquiry)  Collection Time Result Time NA K CL CO2 AGAP   10/15/21 18:17:00 10/15/21 19:03:47 138 3.8 108 23 7       Collection Time Result Time GLU BUN CREA BUCR GFRAA   10/15/21 18:17:00 10/15/21 19:03:47 112High  21High  0.83 25High  >60       Collection Time Result Time GFRNA CA TBIL GPT SGOT   10/15/21 18:17:00 10/15/21 19:03:47 >60   Estimated GFR is calcu. .. 9.4 0.7 59 74High        Collection Time Result Time AP TP ALB GLOB AGRAT   10/15/21 18:17:00 10/15/21 19:03:47 126High  7.9 3.4Low  4.5High  0.8Low        Final result                        Contains abnormal data PROTHROMBIN TIME + INR (Final result)   Component (Lab Inquiry)  Collection Time Result Time INR PTP   10/15/21 18:17:00 10/15/21 18:47:22 1.2   A single therapeutic r. Dana Sheeban Dana Nunn High  12. 3High        Final result                        CULTURE, BLOOD, PAIRED (Preliminary result)   Component (Lab Inquiry)  Collection Time Result Time SREQ CULT   10/15/21 18:17:00 10/16/21 09:48:09 NO SPECIAL REQUESTS NO GROWTH AFTER 15 HOURS               Contains abnormal data SED RATE (ESR) (Final result)   Component (Lab Inquiry)  Collection Time Result Time ESRA   10/15/21 18:17:00 10/15/21 18:54:23 49High        Final result                        C REACTIVE PROTEIN, QT (Final result)   Component (Lab Inquiry)  Collection Time Result Time CRP   10/15/21 18:17:00 10/15/21 20:34:05 <0.29   CRP is a nonspecific a. ..         Final result             Radiologic Studies -   XR CHEST PA LAT   Final Result   Right PICC line terminates in superior vena cava. No pneumothorax or   pleural effusion demonstrated. CT Results  (Last 48 hours)    None        CXR Results  (Last 48 hours)    None          Medical Decision Making   I am the first provider for this patient.     I reviewed the vital signs, available nursing notes, past medical history, past surgical history, family history and social history. Vital Signs-Reviewed the patient's vital signs. Patient Vitals for the past 12 hrs:   Temp Pulse Resp BP SpO2   10/15/21 1744 98.6 °F (37 °C) 99 18 (!) 182/136 94 %       EKG interpretation: (Preliminary)  NSR, rate 86, norrnal axis,/pr/qrs, no acute ST changes, Patrick Hansen DO      Records Reviewed: Nursing Notes, Old Medical Records, Previous electrocardiograms, Previous Radiology Studies and Previous Laboratory Studies    Provider Notes (Medical Decision Making):   DDx- Osteomyelitis, cellulitis       ED Course:   Initial assessment performed. The patients presenting problems have been discussed, and they are in agreement with the care plan formulated and outlined with them. I have encouraged them to ask questions as they arise throughout their visit. Consult:   Pt seen by Dr. David Alfaro will plan for great toe amputation, IV Ab ordered. Consult:  Case discussed with Dr. Anju braxton will seen and admit. Disposition:  Admit       Diagnosis     Clinical Impression:   1. Osteomyelitis of great toe of right foot ([address]    2. Cellulitis of foot        Attestations:    Patrick Hansen DO    Please note that this dictation was completed with [employer], the computer voice recognition software. Quite often unanticipated grammatical, syntax, homophones, and other interpretive errors are inadvertently transcribed by the computer software. Please disregard these errors. Please excuse any errors that have escaped final proofreading. Thank you.

## 2021-10-16 ENCOUNTER — ANESTHESIA (OUTPATIENT)
Dept: SURGERY | Age: 76
DRG: 617 | End: 2021-10-16
Payer: COMMERCIAL

## 2021-10-16 ENCOUNTER — ANESTHESIA EVENT (OUTPATIENT)
Dept: SURGERY | Age: 76
DRG: 617 | End: 2021-10-16
Payer: COMMERCIAL

## 2021-10-16 LAB
ANION GAP SERPL CALC-SCNC: 5 MMOL/L (ref 5–15)
ATRIAL RATE: 86 BPM
BUN SERPL-MCNC: 20 MG/DL (ref 6–20)
BUN/CREAT SERPL: 24 (ref 12–20)
CALCIUM SERPL-MCNC: 8.8 MG/DL (ref 8.5–10.1)
CALCULATED P AXIS, ECG09: 52 DEGREES
CALCULATED R AXIS, ECG10: 32 DEGREES
CALCULATED T AXIS, ECG11: 56 DEGREES
CHLORIDE SERPL-SCNC: 109 MMOL/L (ref 97–108)
CO2 SERPL-SCNC: 25 MMOL/L (ref 21–32)
CREAT SERPL-MCNC: 0.84 MG/DL (ref 0.7–1.3)
DIAGNOSIS, 93000: NORMAL
ERYTHROCYTE [DISTWIDTH] IN BLOOD BY AUTOMATED COUNT: 14.4 % (ref 11.5–14.5)
GLUCOSE BLD STRIP.AUTO-MCNC: 114 MG/DL (ref 65–117)
GLUCOSE BLD STRIP.AUTO-MCNC: 141 MG/DL (ref 65–117)
GLUCOSE BLD STRIP.AUTO-MCNC: 148 MG/DL (ref 65–117)
GLUCOSE BLD STRIP.AUTO-MCNC: 239 MG/DL (ref 65–117)
GLUCOSE BLD STRIP.AUTO-MCNC: 255 MG/DL (ref 65–117)
GLUCOSE SERPL-MCNC: 135 MG/DL (ref 65–100)
HCT VFR BLD AUTO: 28.9 % (ref 36.6–50.3)
HGB BLD-MCNC: 9.4 G/DL (ref 12.1–17)
MCH RBC QN AUTO: 27.2 PG (ref 26–34)
MCHC RBC AUTO-ENTMCNC: 32.5 G/DL (ref 30–36.5)
MCV RBC AUTO: 83.8 FL (ref 80–99)
NRBC # BLD: 0 K/UL (ref 0–0.01)
NRBC BLD-RTO: 0 PER 100 WBC
P-R INTERVAL, ECG05: 162 MS
PLATELET # BLD AUTO: 55 K/UL (ref 150–400)
PMV BLD AUTO: 10 FL (ref 8.9–12.9)
POTASSIUM SERPL-SCNC: 3.9 MMOL/L (ref 3.5–5.1)
Q-T INTERVAL, ECG07: 396 MS
QRS DURATION, ECG06: 80 MS
QTC CALCULATION (BEZET), ECG08: 473 MS
RBC # BLD AUTO: 3.45 M/UL (ref 4.1–5.7)
SERVICE CMNT-IMP: ABNORMAL
SERVICE CMNT-IMP: NORMAL
SODIUM SERPL-SCNC: 139 MMOL/L (ref 136–145)
VENTRICULAR RATE, ECG03: 86 BPM
WBC # BLD AUTO: 2.6 K/UL (ref 4.1–11.1)

## 2021-10-16 PROCEDURE — 0Y6P0Z0 DETACHMENT AT RIGHT 1ST TOE, COMPLETE, OPEN APPROACH: ICD-10-PCS | Performed by: PODIATRIST

## 2021-10-16 PROCEDURE — 74011000250 HC RX REV CODE- 250: Performed by: PODIATRIST

## 2021-10-16 PROCEDURE — 76210000006 HC OR PH I REC 0.5 TO 1 HR: Performed by: PODIATRIST

## 2021-10-16 PROCEDURE — 74011636637 HC RX REV CODE- 636/637: Performed by: INTERNAL MEDICINE

## 2021-10-16 PROCEDURE — 74011250637 HC RX REV CODE- 250/637: Performed by: PODIATRIST

## 2021-10-16 PROCEDURE — 76010000138 HC OR TIME 0.5 TO 1 HR: Performed by: PODIATRIST

## 2021-10-16 PROCEDURE — 88311 DECALCIFY TISSUE: CPT

## 2021-10-16 PROCEDURE — 74011000258 HC RX REV CODE- 258: Performed by: INTERNAL MEDICINE

## 2021-10-16 PROCEDURE — 77030031139 HC SUT VCRL2 J&J -A: Performed by: PODIATRIST

## 2021-10-16 PROCEDURE — 36415 COLL VENOUS BLD VENIPUNCTURE: CPT

## 2021-10-16 PROCEDURE — 74011250636 HC RX REV CODE- 250/636: Performed by: NURSE ANESTHETIST, CERTIFIED REGISTERED

## 2021-10-16 PROCEDURE — 77030002916 HC SUT ETHLN J&J -A: Performed by: PODIATRIST

## 2021-10-16 PROCEDURE — 77030042316 HC BLD SAW -B: Performed by: PODIATRIST

## 2021-10-16 PROCEDURE — 74011250636 HC RX REV CODE- 250/636: Performed by: ANESTHESIOLOGY

## 2021-10-16 PROCEDURE — 74011250637 HC RX REV CODE- 250/637: Performed by: INTERNAL MEDICINE

## 2021-10-16 PROCEDURE — 77030000032 HC CUF TRNQT ZIMM -B: Performed by: PODIATRIST

## 2021-10-16 PROCEDURE — 80048 BASIC METABOLIC PNL TOTAL CA: CPT

## 2021-10-16 PROCEDURE — 74011250636 HC RX REV CODE- 250/636: Performed by: INTERNAL MEDICINE

## 2021-10-16 PROCEDURE — 2709999900 HC NON-CHARGEABLE SUPPLY: Performed by: PODIATRIST

## 2021-10-16 PROCEDURE — 74011000250 HC RX REV CODE- 250: Performed by: NURSE ANESTHETIST, CERTIFIED REGISTERED

## 2021-10-16 PROCEDURE — 87205 SMEAR GRAM STAIN: CPT

## 2021-10-16 PROCEDURE — 65270000029 HC RM PRIVATE

## 2021-10-16 PROCEDURE — 85027 COMPLETE CBC AUTOMATED: CPT

## 2021-10-16 PROCEDURE — 88305 TISSUE EXAM BY PATHOLOGIST: CPT

## 2021-10-16 PROCEDURE — 76060000032 HC ANESTHESIA 0.5 TO 1 HR: Performed by: PODIATRIST

## 2021-10-16 PROCEDURE — 87075 CULTR BACTERIA EXCEPT BLOOD: CPT

## 2021-10-16 PROCEDURE — 82962 GLUCOSE BLOOD TEST: CPT

## 2021-10-16 RX ORDER — ACETAMINOPHEN 325 MG/1
650 TABLET ORAL
Status: DISCONTINUED | OUTPATIENT
Start: 2021-10-16 | End: 2021-10-20 | Stop reason: HOSPADM

## 2021-10-16 RX ORDER — MIDAZOLAM HYDROCHLORIDE 1 MG/ML
INJECTION, SOLUTION INTRAMUSCULAR; INTRAVENOUS AS NEEDED
Status: DISCONTINUED | OUTPATIENT
Start: 2021-10-16 | End: 2021-10-16 | Stop reason: HOSPADM

## 2021-10-16 RX ORDER — ONDANSETRON 4 MG/1
4 TABLET, ORALLY DISINTEGRATING ORAL
Status: DISCONTINUED | OUTPATIENT
Start: 2021-10-16 | End: 2021-10-20 | Stop reason: HOSPADM

## 2021-10-16 RX ORDER — LIDOCAINE HYDROCHLORIDE 20 MG/ML
INJECTION, SOLUTION EPIDURAL; INFILTRATION; INTRACAUDAL; PERINEURAL AS NEEDED
Status: DISCONTINUED | OUTPATIENT
Start: 2021-10-16 | End: 2021-10-16 | Stop reason: HOSPADM

## 2021-10-16 RX ORDER — POLYETHYLENE GLYCOL 3350 17 G/17G
17 POWDER, FOR SOLUTION ORAL DAILY PRN
Status: DISCONTINUED | OUTPATIENT
Start: 2021-10-16 | End: 2021-10-20 | Stop reason: HOSPADM

## 2021-10-16 RX ORDER — HYDROMORPHONE HYDROCHLORIDE 1 MG/ML
.2-.5 INJECTION, SOLUTION INTRAMUSCULAR; INTRAVENOUS; SUBCUTANEOUS
Status: DISCONTINUED | OUTPATIENT
Start: 2021-10-16 | End: 2021-10-16 | Stop reason: HOSPADM

## 2021-10-16 RX ORDER — PHENYLEPHRINE HCL IN 0.9% NACL 0.4MG/10ML
SYRINGE (ML) INTRAVENOUS AS NEEDED
Status: DISCONTINUED | OUTPATIENT
Start: 2021-10-16 | End: 2021-10-16 | Stop reason: HOSPADM

## 2021-10-16 RX ORDER — DEXAMETHASONE SODIUM PHOSPHATE 4 MG/ML
INJECTION, SOLUTION INTRA-ARTICULAR; INTRALESIONAL; INTRAMUSCULAR; INTRAVENOUS; SOFT TISSUE AS NEEDED
Status: DISCONTINUED | OUTPATIENT
Start: 2021-10-16 | End: 2021-10-16 | Stop reason: HOSPADM

## 2021-10-16 RX ORDER — BUPIVACAINE HYDROCHLORIDE 5 MG/ML
INJECTION, SOLUTION EPIDURAL; INTRACAUDAL AS NEEDED
Status: DISCONTINUED | OUTPATIENT
Start: 2021-10-16 | End: 2021-10-16 | Stop reason: HOSPADM

## 2021-10-16 RX ORDER — MIDAZOLAM HYDROCHLORIDE 1 MG/ML
1 INJECTION, SOLUTION INTRAMUSCULAR; INTRAVENOUS AS NEEDED
Status: DISCONTINUED | OUTPATIENT
Start: 2021-10-16 | End: 2021-10-16 | Stop reason: HOSPADM

## 2021-10-16 RX ORDER — FENTANYL CITRATE 50 UG/ML
25 INJECTION, SOLUTION INTRAMUSCULAR; INTRAVENOUS
Status: DISCONTINUED | OUTPATIENT
Start: 2021-10-16 | End: 2021-10-16 | Stop reason: HOSPADM

## 2021-10-16 RX ORDER — HYDRALAZINE HYDROCHLORIDE 20 MG/ML
10 INJECTION INTRAMUSCULAR; INTRAVENOUS
Status: DISCONTINUED | OUTPATIENT
Start: 2021-10-16 | End: 2021-10-20 | Stop reason: HOSPADM

## 2021-10-16 RX ORDER — POLYETHYLENE GLYCOL 3350 17 G/17G
17 POWDER, FOR SOLUTION ORAL DAILY PRN
Status: DISCONTINUED | OUTPATIENT
Start: 2021-10-16 | End: 2021-10-18 | Stop reason: SDUPTHER

## 2021-10-16 RX ORDER — ONDANSETRON 2 MG/ML
INJECTION INTRAMUSCULAR; INTRAVENOUS AS NEEDED
Status: DISCONTINUED | OUTPATIENT
Start: 2021-10-16 | End: 2021-10-16 | Stop reason: HOSPADM

## 2021-10-16 RX ORDER — INSULIN LISPRO 100 [IU]/ML
INJECTION, SOLUTION INTRAVENOUS; SUBCUTANEOUS
Status: DISCONTINUED | OUTPATIENT
Start: 2021-10-16 | End: 2021-10-20 | Stop reason: HOSPADM

## 2021-10-16 RX ORDER — MAGNESIUM SULFATE 100 %
4 CRYSTALS MISCELLANEOUS AS NEEDED
Status: DISCONTINUED | OUTPATIENT
Start: 2021-10-16 | End: 2021-10-20 | Stop reason: HOSPADM

## 2021-10-16 RX ORDER — SODIUM CHLORIDE 0.9 % (FLUSH) 0.9 %
5-40 SYRINGE (ML) INJECTION EVERY 8 HOURS
Status: DISCONTINUED | OUTPATIENT
Start: 2021-10-16 | End: 2021-10-20 | Stop reason: HOSPADM

## 2021-10-16 RX ORDER — SODIUM CHLORIDE, SODIUM LACTATE, POTASSIUM CHLORIDE, CALCIUM CHLORIDE 600; 310; 30; 20 MG/100ML; MG/100ML; MG/100ML; MG/100ML
25 INJECTION, SOLUTION INTRAVENOUS CONTINUOUS
Status: DISCONTINUED | OUTPATIENT
Start: 2021-10-16 | End: 2021-10-16 | Stop reason: HOSPADM

## 2021-10-16 RX ORDER — FENTANYL CITRATE 50 UG/ML
INJECTION, SOLUTION INTRAMUSCULAR; INTRAVENOUS AS NEEDED
Status: DISCONTINUED | OUTPATIENT
Start: 2021-10-16 | End: 2021-10-16 | Stop reason: HOSPADM

## 2021-10-16 RX ORDER — LIDOCAINE HYDROCHLORIDE 10 MG/ML
0.1 INJECTION, SOLUTION EPIDURAL; INFILTRATION; INTRACAUDAL; PERINEURAL AS NEEDED
Status: DISCONTINUED | OUTPATIENT
Start: 2021-10-16 | End: 2021-10-16 | Stop reason: HOSPADM

## 2021-10-16 RX ORDER — DEXTROSE 50 % IN WATER (D50W) INTRAVENOUS SYRINGE
25-50 AS NEEDED
Status: DISCONTINUED | OUTPATIENT
Start: 2021-10-16 | End: 2021-10-20 | Stop reason: HOSPADM

## 2021-10-16 RX ORDER — ONDANSETRON 2 MG/ML
4 INJECTION INTRAMUSCULAR; INTRAVENOUS
Status: DISCONTINUED | OUTPATIENT
Start: 2021-10-16 | End: 2021-10-20 | Stop reason: HOSPADM

## 2021-10-16 RX ORDER — OXYCODONE HYDROCHLORIDE 5 MG/1
5 TABLET ORAL
Status: DISCONTINUED | OUTPATIENT
Start: 2021-10-16 | End: 2021-10-20 | Stop reason: HOSPADM

## 2021-10-16 RX ORDER — ACETAMINOPHEN 650 MG/1
650 SUPPOSITORY RECTAL
Status: DISCONTINUED | OUTPATIENT
Start: 2021-10-16 | End: 2021-10-20 | Stop reason: HOSPADM

## 2021-10-16 RX ORDER — PROPOFOL 10 MG/ML
INJECTION, EMULSION INTRAVENOUS AS NEEDED
Status: DISCONTINUED | OUTPATIENT
Start: 2021-10-16 | End: 2021-10-16 | Stop reason: HOSPADM

## 2021-10-16 RX ORDER — SODIUM CHLORIDE 0.9 % (FLUSH) 0.9 %
5-40 SYRINGE (ML) INJECTION AS NEEDED
Status: DISCONTINUED | OUTPATIENT
Start: 2021-10-16 | End: 2021-10-20 | Stop reason: HOSPADM

## 2021-10-16 RX ORDER — FENTANYL CITRATE 50 UG/ML
50 INJECTION, SOLUTION INTRAMUSCULAR; INTRAVENOUS AS NEEDED
Status: DISCONTINUED | OUTPATIENT
Start: 2021-10-16 | End: 2021-10-16 | Stop reason: HOSPADM

## 2021-10-16 RX ADMIN — Medication 200 MCG: at 11:10

## 2021-10-16 RX ADMIN — Medication 120 MCG: at 11:02

## 2021-10-16 RX ADMIN — INSULIN LISPRO 2 UNITS: 100 INJECTION, SOLUTION INTRAVENOUS; SUBCUTANEOUS at 21:56

## 2021-10-16 RX ADMIN — HYDRALAZINE HYDROCHLORIDE 10 MG: 20 INJECTION INTRAMUSCULAR; INTRAVENOUS at 13:54

## 2021-10-16 RX ADMIN — Medication 10 ML: at 06:09

## 2021-10-16 RX ADMIN — PANTOPRAZOLE SODIUM 40 MG: 40 TABLET, DELAYED RELEASE ORAL at 08:37

## 2021-10-16 RX ADMIN — PROPOFOL 50 MG: 10 INJECTION, EMULSION INTRAVENOUS at 10:34

## 2021-10-16 RX ADMIN — Medication 10 ML: at 14:44

## 2021-10-16 RX ADMIN — LIDOCAINE HYDROCHLORIDE 40 MG: 20 INJECTION, SOLUTION EPIDURAL; INFILTRATION; INTRACAUDAL; PERINEURAL at 10:34

## 2021-10-16 RX ADMIN — SODIUM CHLORIDE, POTASSIUM CHLORIDE, SODIUM LACTATE AND CALCIUM CHLORIDE: 600; 310; 30; 20 INJECTION, SOLUTION INTRAVENOUS at 10:31

## 2021-10-16 RX ADMIN — LOSARTAN POTASSIUM 100 MG: 100 TABLET, FILM COATED ORAL at 08:37

## 2021-10-16 RX ADMIN — FENTANYL CITRATE 50 MCG: 50 INJECTION, SOLUTION INTRAMUSCULAR; INTRAVENOUS at 10:34

## 2021-10-16 RX ADMIN — DEXAMETHASONE SODIUM PHOSPHATE 4 MG: 4 INJECTION, SOLUTION INTRAMUSCULAR; INTRAVENOUS at 10:53

## 2021-10-16 RX ADMIN — VANCOMYCIN HYDROCHLORIDE 1250 MG: 10 INJECTION, POWDER, LYOPHILIZED, FOR SOLUTION INTRAVENOUS at 10:52

## 2021-10-16 RX ADMIN — Medication 80 MCG: at 10:50

## 2021-10-16 RX ADMIN — PIPERACILLIN AND TAZOBACTAM 3.38 G: 3; .375 INJECTION, POWDER, LYOPHILIZED, FOR SOLUTION INTRAVENOUS at 10:37

## 2021-10-16 RX ADMIN — ONDANSETRON HYDROCHLORIDE 4 MG: 2 INJECTION, SOLUTION INTRAMUSCULAR; INTRAVENOUS at 10:53

## 2021-10-16 RX ADMIN — INSULIN LISPRO 2 UNITS: 100 INJECTION, SOLUTION INTRAVENOUS; SUBCUTANEOUS at 08:34

## 2021-10-16 RX ADMIN — MIDAZOLAM HYDROCHLORIDE 2 MG: 1 INJECTION, SOLUTION INTRAMUSCULAR; INTRAVENOUS at 10:28

## 2021-10-16 RX ADMIN — PROPOFOL 75 MCG/KG/MIN: 10 INJECTION, EMULSION INTRAVENOUS at 10:37

## 2021-10-16 RX ADMIN — Medication 10 ML: at 21:56

## 2021-10-16 RX ADMIN — PIPERACILLIN AND TAZOBACTAM 3.38 G: 3; .375 INJECTION, POWDER, LYOPHILIZED, FOR SOLUTION INTRAVENOUS at 01:52

## 2021-10-16 RX ADMIN — Medication 3 AMPULE: at 10:29

## 2021-10-16 RX ADMIN — INSULIN LISPRO 5 UNITS: 100 INJECTION, SOLUTION INTRAVENOUS; SUBCUTANEOUS at 17:37

## 2021-10-16 NOTE — PROGRESS NOTES
Admission assessment completed by this RN upon arrival to surgical-telemetry unit. Care plan and education initiated. MCKENNA Foley to assume care of patient at this time.        Nessa Obregon RN

## 2021-10-16 NOTE — OP NOTES
[address]  OPERATIVE REPORT    Name:  Shanna Saint  MR#:  [id number]  :  1945  ACCOUNT #:  [de-identified]  DATE OF SERVICE:  10/16/2021    PREOPERATIVE DIAGNOSIS:  Osteomyelitis, right hallux. POSTOPERATIVE DIAGNOSIS:  Osteomyelitis, right hallux. PROCEDURE PERFORMED:  Amputation, right hallux at metatarsophalangeal joint. SURGEON:  Marylou Valdes DPM    ASSISTANT:  Not applicable. ANESTHESIA:  IV sedation with local.    COMPLICATIONS:  None. SPECIMENS REMOVED:  1. Aerobic and anaerobic bone cultures, right hallux. 2.  Osteomyelitis, right hallux. 3.  Bone margin, right first metatarsal head. IMPLANTS:  None. ESTIMATED BLOOD LOSS:  Minimal.    PROCEDURE IN DETAIL:  The patient was brought into the operating room and placed supine upon the OR table. After administration of IV sedation, I injected the right foot with 16 mL of 0.5% plain Marcaine. The foot was prepped and draped in the usual sterile technique. The foot was elevated for approximately 3 minutes before the inflation of a tourniquet around the right ankle during which time, a time-out was performed. Attention was directed to the right great toe. A pair of converging semi-elliptical incisions were made over the medial and lateral aspects of the right hallux at the base of the proximal phalanx. These were carried down directly to bone and the right first metatarsophalangeal joint was sharply disarticulated with a #15 scalpel, but leaving the attachment of the flexor hallucis longus tendon intact. Examination of the tendon revealed that distally within the level of the toe, the tendon was soft, discolored and thickened with some necrotic portions. Tension was applied to the tendon and pulled out to the point at which it returned to normal color and consistency. At which point, a #15 blade was used to transversely cut the tendon.   On the back table, a small rongeur was used to remove bone samples from the head of the proximal phalanx of the right hallux and these were placed into aerobic and anaerobic culture tubes. A small Metzenbaum scissors was then used to remove additional necrotic tissue from the surgical wound after which the soft tissue margins appeared clean and healthy. A power micro sagittal saw was used to resect just a wafer of cartilage and subchondral bone from the first metatarsal head and this will be sent to pathology labeled bone margin. The surgical site was flushed well with sterile irrigation. The tourniquet was released and there was only light bleeding from the surgical site. The wound was then closed utilizing 3-0 Vicryl for closure of deep tissue and 4-0 nylon for closure of the skin. The foot was then cleaned and dried, and then wrapped in dry sterile dressings incorporating Adaptic over the surgical incision. The patient was transported to the recovery room with vital signs stable and vascular status intact. The patient will remain until deemed stable for transfer back up to his hospital bed. I recommend that he be kept in-house until we have the results of the cultures and the bone margin as well. I will continue to follow him.         Des Haynes DPM [id number]  D:  10/16/2021 11:32  T:  10/16/2021 15:43  JOB #:  [id number]

## 2021-10-16 NOTE — PROGRESS NOTES
0900 - Jaden Toro called because wife was at the patient's bedside screaming at the patient and staff due to water and crackers being at the bedside. However, the patient stated they did not eat/drink anything past midnight    0928 - Called Dr. Nicky Wheeler about the possibility of the patient drinking a small amount of water because the water was at the bedside. MD was notified per spouses request.     18 - Messaged MD. \"Just wanted to let you know the patient's blood pressure was 178/82. Do you want me to give them anything for that? Thank you. \"    End of Shift Note    Bedside shift change report given to RACHELE Stephens (oncoming nurse) by Ayden Cates RN (offgoing nurse). Report included the following information SBAR, Kardex, Intake/Output and MAR    Shift worked:  7am-7pm     Shift summary and any significant changes:     Code atlas was called today due to wife. Please read above information for more. Pt had a blood pressure in the 190s, so hydralazine was given. This seemed to decrease the BP slightly. Pt did not complain of pain during the shift. Pt is tolerating the current diet. Pt did not leave the bed after the procedure.     Concerns for physician to address:       Zone phone for oncoming shift:          Activity:  Activity Level: Bath Room Privileges, Up ad xu  Number times ambulated in hallways past shift: 0  Number of times OOB to chair past shift: 0    Cardiac:   Cardiac Monitoring: No      Cardiac Rhythm: Sinus Rhythm    Access:   Current line(s): PIV and PICC     Genitourinary:   Urinary status: voiding    Respiratory:   O2 Device: None (Room air)  Chronic home O2 use?: NO  Incentive spirometer at bedside: YES     GI:     Current diet:  ADULT DIET Regular; 4 carb choices (60 gm/meal)  Passing flatus: YES  Tolerating current diet: YES       Pain Management:   Patient states pain is manageable on current regimen: YES    Skin:  Lux Score: 19  Interventions: float heels and increase time out of bed    Patient Safety:  Fall Score:  Total Score: 1  Interventions: gripper socks and pt to call before getting OOB       Length of Stay:  Expected LOS: - - -  Actual LOS: RACHELE Sherman

## 2021-10-16 NOTE — PERIOP NOTES
TRANSFER - IN REPORT:    Verbal report received from [facility], RN(name) on [place] [facility]  being received from 3211(unit) for ordered procedure      Report consisted of patients Situation, Background, Assessment and   Recommendations(SBAR). Information from the following report(s) SBAR, Kardex, ED Summary, OR Summary, Procedure Summary, Intake/Output, MAR, Accordion, Recent Results, Med Rec Status, Alarm Parameters , Pre Procedure Checklist, Procedure Verification and Quality Measures was reviewed with the receiving nurse. Opportunity for questions and clarification was provided. Assessment completed upon patients arrival to unit and care assumed.

## 2021-10-16 NOTE — BRIEF OP NOTE
Brief Postoperative Note    Patient: Duarte Vazquez  YOB: 1945  MRN: [id number]    Date of Procedure: 10/16/2021     Pre-Op Diagnosis: OSTEOMYELITIS RIGHT HALLUX    Post-Op Diagnosis: Same as preoperative diagnosis.       Procedure(s):  AMPUTATION RIGHT GREAT TOE AT MPJ (URGENT)    Surgeon(s):  Nish Echeverria DPM    Surgical Assistant: None    Anesthesia: MAC     Estimated Blood Loss (mL): Minimal    Complications: None    Specimens:   ID Type Source Tests Collected by Time Destination   1 : RIGHT GREAT TOE OSTEOMYELITIS Preservative Foot, Right  [facility], [place] 10/16/2021 1057 Pathology   2 : BONE MARGIN RIGHT FIRST METATARSAL Preservative Foot, Right  [facility], [place] 10/16/2021 1058 Pathology   1 : BONE CULTURES RIGHT HALLUX Bone Foot, Right [facility]        Implants: * No implants in log *    Drains: * No LDAs found *    Findings: NECROTIC FHL TENDON DISTALLY, NORMAL PROXIMALLY    Electronically Signed by Evy Villegas DPM on 10/16/2021 at 11:23 AM

## 2021-10-16 NOTE — ANESTHESIA PREPROCEDURE EVALUATION
Relevant Problems   CARDIOVASCULAR   (+) Aortic stenosis   (+) Severe aortic stenosis      HEMATOLOGY   (+) Osteomyelitis (HCC)       Anesthetic History   No history of anesthetic complications            Review of Systems / Medical History  Patient summary reviewed and pertinent labs reviewed    Pulmonary  Within defined limits                 Neuro/Psych   Within defined limits           Cardiovascular    Hypertension        Dysrhythmias : atrial fibrillation      Exercise tolerance: <4 METS  Comments: TTE  · LV: Estimated LVEF is 65 - 70%. Borderline hyperdynamic LV systolic function. Normal cavity size and systolic function (ejection fraction normal). Mild concentric hypertrophy. Mild (grade 1) left ventricular diastolic dysfunction. · IAS: There was no shunting at baseline. · AV: Prosthetic aortic valve. Aortic valve mean gradient is 8 mmHg. EOA 3.09 cm2. There is a 29 mm Giles (+3 mL) prosthetic aortic valve from prior TAVR procedure. Prosthesis is normal. Trace paravalvular leak. · TV: Mild tricuspid valve regurgitation is present. Right Ventricular Arterial Pressure (RVSP) is 30 mmHg. Pulmonary hypertension not suggested by Doppler findings.     S/p TAVR   GI/Hepatic/Renal  Within defined limits              Endo/Other    Diabetes         Other Findings   Comments: Osteomyelitis  Atrial fibrillation   HTN (hypertension)  Diabetes mellitus, type 2  Aortic regurgitation  Colon polyps  GI bleed  Aortic stenosis- S/P TAVR 3/21/21         Physical Exam    Airway  Mallampati: II  TM Distance: 4 - 6 cm  Neck ROM: normal range of motion   Mouth opening: Normal     Cardiovascular  Regular rate and rhythm,  S1 and S2 normal,  no murmur, click, rub, or gallop  Rhythm: regular  Rate: normal         Dental  No notable dental hx       Pulmonary  Breath sounds clear to auscultation               Abdominal  GI exam deferred       Other Findings            Anesthetic Plan    ASA: 3  Anesthesia type: MAC          Induction: Intravenous  Anesthetic plan and risks discussed with: Patient

## 2021-10-16 NOTE — PROGRESS NOTES
1900---bedside report received from jason Forte, pt resting in bed oriented x 4, has no complaints at this time call bell in reach assessment as noted    0530--received call from wife requesting endocrinology for blood sugar management    0700--dr carr perfectserved about request for endocrinology and restarting homes    0730- bedside report given to jason Forte who is assuming care of pt

## 2021-10-16 NOTE — PROGRESS NOTES
Pharmacy Automatic Renal Dosing Protocol - Antimicrobials    Indication for Antimicrobials: OM      Current Regimen of Each Antimicrobial:  2000 mg x 1 then 1250 mg Q12H (Start Date 10/15/21; Day # 1)    Previous Antimicrobial Therapy:    Vancomycin Goal Level: 400-600 mg*hour/L per 24 hours    Vancomycin Levels  Date Dose & Interval Measured (mcg/mL) Steady State (mcg/mL)                       Date & time of next level:     Significant Cultures:     Radiology / Imaging results: (X-ray, CT scan or MRI):       Labs:  Recent Labs     10/15/21  1817   CREA 0.83   BUN 21*   WBC 5.7     Temp (24hrs), Av.6 °F (37 °C), Min:98.6 °F (37 °C), Max:98.6 °F (37 °C)      Paralysis, amputations, malnutrition:   Creatinine Clearance (mL/min) or Dialysis: 85.7    Impression/Plan:   Antibiotics as above  Expected , troough 14.7 mcg/ml  BMP daily     Pharmacy will follow daily and adjust medications as appropriate for renal function and/or serum levels. Thank you,  Ted Moses, [facility]      Recommended duration of therapy  http://[facility]/[place]/[facility]/Pharmacy/Clinical%20Companion/Duration%20of%20ABX%20therapy. docx    Renal Dosing  http://[facility]/[place]/[facility]/Pharmacy/Clinical%20Companion/Renal%[email or web address]. pdf

## 2021-10-16 NOTE — ANESTHESIA POSTPROCEDURE EVALUATION
Procedure(s):  AMPUTATION RIGHT GREAT TOE (URGENT). MAC    Anesthesia Post Evaluation      Multimodal analgesia: multimodal analgesia used between 6 hours prior to anesthesia start to PACU discharge  Patient location during evaluation: PACU  Patient participation: complete - patient participated  Level of consciousness: sleepy but conscious and responsive to verbal stimuli  Pain score: 0  Pain management: adequate  Airway patency: patent  Anesthetic complications: no  Cardiovascular status: acceptable  Respiratory status: acceptable  Hydration status: acceptable  Comments: +Post-Anesthesia Evaluation and Assessment    Patient: Aaron Cabello MRN: [id number]  SSN: [id number]   YOB: 1945  Age: 78 y.o. Sex: male      Cardiovascular Function/Vital Signs    BP (!) 145/74   Pulse 77   Temp 36.5 °C (97.7 °F)   Resp 16   Ht 6' (1.829 m)   Wt 86 kg (189 lb 9.5 oz)   SpO2 98%   BMI 25.71 kg/m²     Patient is status post Procedure(s):  AMPUTATION RIGHT GREAT TOE (URGENT). Nausea/Vomiting: Controlled. Postoperative hydration reviewed and adequate. Pain:  Pain Scale 1: Numeric (0 - 10) (10/16/21 1140)  Pain Intensity 1: 0 (10/16/21 1140)   Managed. Neurological Status:   Neuro (WDL): Within Defined Limits (10/16/21 1129)   At baseline. Mental Status and Level of Consciousness: Arousable. Pulmonary Status:   O2 Device: Nasal cannula (10/16/21 1140)   Adequate oxygenation and airway patent. Complications related to anesthesia: None    Post-anesthesia assessment completed. No concerns.     Signed By: Darleen Vu MD    10/16/2021  Post anesthesia nausea and vomiting:  controlled  Final Post Anesthesia Temperature Assessment:  Normothermia (36.0-37.5 degrees C)      INITIAL Post-op Vital signs:   Vitals Value Taken Time   /71 10/16/21 1145   Temp 36.5 °C (97.7 °F) 10/16/21 1129   Pulse 77 10/16/21 1148   Resp 17 10/16/21 1148   SpO2 99 % 10/16/21 1148   Vitals shown include unvalidated device data.

## 2021-10-16 NOTE — PROGRESS NOTES
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 Leopold Schooner and Dr. Joaquim Reece.     Hyperlipidemia POA  Atrial fibrillation s/p Ablation in 2021  Essential HTN POA  -Currently NSR  Continue losartan,  Continue Statin      Code Status: Full code  Surrogate Decision Maker wife     DVT Prophylaxis: SCD  GI Prophylaxis: not indicated     Baseline: high functioning 78 adult male, still works as a banker     Subjective:     Chief Complaint / Reason for Physician Visit  Patient seen on rounds after he returned from [address] [place] [address]. Awake and alert. Denies pain/fevers/chills/nausea/SOB/CP    discussed with RN events overnight. Review of Systems:  Symptom Y/N Comments  Symptom Y/N Comments   Fever/Chills n   Chest Pain n    Poor Appetite n   Edema     Cough n   Abdominal Pain n    Sputum n   Joint Pain     SOB/MANCIA n   Pruritis/Rash     Nausea/vomit n   Tolerating PT/OT     Diarrhea n   Tolerating Diet     Constipation n   Other       Could NOT obtain due to:      Objective:     VITALS:   Last 24hrs VS reviewed since prior progress note.  Most recent are:  Patient Vitals for the past 24 hrs:   Temp Pulse Resp BP SpO2   10/16/21 1228 97.7 °F (36.5 °C) 75 18 (!) 178/82 100 %   10/16/21 1200 -- 76 19 (!) 154/78 97 %   10/16/21 1145 -- 77 16 (!) 143/71 98 %   10/16/21 1140 -- 79 20 (!) 145/74 100 %   10/16/21 1135 -- 78 14 124/74 100 %   10/16/21 1130 -- 78 18 130/63 99 %   10/16/21 1129 97.7 °F (36.5 °C) 77 23 119/68 99 %   10/16/21 0949 98.6 °F (37 °C) 89 16 (!) 166/83 98 %   10/16/21 0749 -- -- -- (!) 194/98 --   10/16/21 0745 98.1 °F (36.7 °C) 83 17 (!) 195/89 96 %   10/16/21 0401 98.8 °F (37.1 °C) 84 17 (!) 178/92 98 %   10/16/21 0001 98.5 °F (36.9 °C) 87 18 (!) 155/57 98 %   10/15/21 2124 98.2 °F (36.8 °C) 88 18 (!) 175/94 98 %   10/15/21 1955 -- -- -- -- 97 %   10/15/21 1953 -- -- -- (!) 168/64 --   10/15/21 1744 98.6 °F (37 °C) 99 18 (!) 182/136 94 %       Intake/Output Summary (Last 24 hours) at 10/16/2021 1258  Last data filed at 10/16/2021 1127  Gross per 24 hour   Intake 1000 ml   Output 206 ml   Net 794 ml        I had a face to face encounter and independently examined this patient on 10/16/2021, as outlined below:  PHYSICAL EXAM:  General: WD, WN. Alert, cooperative, no acute distress    EENT:  EOMI. Anicteric sclerae. MMM  Resp:  CTA bilaterally, no wheezing or rales. No accessory muscle use  CV:  Regular  rhythm,  No edema  GI:  Soft, Non distended, Non tender. +Bowel sounds  Neurologic:  Alert and oriented X 3, normal speech,   Psych:   Good insight. Not anxious nor agitated  Skin:  No rashes. No jaundice, +dressing Rt foot    Reviewed most current lab test results and cultures  YES  Reviewed most current radiology test results   YES  Review and summation of old records today    NO  Reviewed patient's current orders and MAR    YES  PMH/SH reviewed - no change compared to H&P  ________________________________________________________________________  Care Plan discussed with:    Comments   Patient x    Family      RN x    Care Manager     Consultant                        Multidiciplinary team rounds were held today with , nursing, pharmacist and clinical coordinator.   Patient's plan of care was discussed; medications were reviewed and discharge planning was addressed. ________________________________________________________________________  Total NON critical care TIME:  35   Minutes    Total CRITICAL CARE TIME Spent:   Minutes non procedure based      Comments   >50% of visit spent in counseling and coordination of care x    ________________________________________________________________________  Isabel Briones DO     Procedures: see electronic medical records for all procedures/Xrays and details which were not copied into this note but were reviewed prior to creation of Plan. LABS:  I reviewed today's most current labs and imaging studies.   Pertinent labs include:  Recent Labs     10/16/21  0555 10/15/21  1817   WBC 2.6* 5.7   HGB 9.4* 10.9*   HCT 28.9* 34.1*   PLT 55* 99*     Recent Labs     10/16/21  0555 10/15/21  1817    138   K 3.9 3.8   * 108   CO2 25 23   * 112*   BUN 20 21*   CREA 0.84 0.83   CA 8.8 9.4   ALB  --  3.4*   TBILI  --  0.7   ALT  --  59   INR  --  1.2*       Signed: Isabel Briones DO

## 2021-10-16 NOTE — PROGRESS NOTES
TRANSFER - OUT REPORT:    Verbal report given to RACHELE Jean-Baptiste(name) on José Miguel Perez  being transferred to [facility](unit) for routine post - op       Report consisted of patients Situation, Background, Assessment and   Recommendations(SBAR). Information from the following report(s) SBAR, Kardex, OR Summary and MAR was reviewed with the receiving nurse. Lines:   PICC Single Lumen 93/86/19 Right;Basilic (Active)   Central Line Being Utilized Yes 10/16/21 1129   Criteria for Appropriate Use Long term IV/antibiotic administration 10/16/21 1129   Site Assessment Clean, dry, & intact 10/16/21 1200   Phlebitis Assessment 0 10/16/21 1129   Infiltration Assessment 0 10/16/21 1129   Date of Last Dressing Change 10/14/21 10/16/21 0949   Dressing Status Clean, dry, & intact 10/16/21 0949   Dressing Type Disk with Chlorhexadine gluconate (CHG); Transparent 10/16/21 0949   Hub Color/Line Status Purple;Capped;Flushed; Infusing 10/16/21 0949   Action Taken Open ports on tubing capped 10/16/21 0046   Positive Blood Return (Site #1) Yes 10/16/21 0949   Alcohol Cap Used Yes 10/16/21 0949       Peripheral IV 10/15/21 Anterior;Distal;Left Forearm (Active)   Site Assessment Clean, dry, & intact 10/16/21 1200   Phlebitis Assessment 0 10/16/21 1200   Infiltration Assessment 0 10/16/21 1129   Dressing Status Clean, dry, & intact 10/16/21 1129   Dressing Type Transparent 10/16/21 1129   Hub Color/Line Status Pink; Infusing 10/16/21 1129   Alcohol Cap Used Yes 10/15/21 2124        Opportunity for questions and clarification was provided.       Patient transported with:   Registered Nurse

## 2021-10-17 LAB
ANION GAP SERPL CALC-SCNC: 5 MMOL/L (ref 5–15)
BUN SERPL-MCNC: 19 MG/DL (ref 6–20)
BUN/CREAT SERPL: 23 (ref 12–20)
CALCIUM SERPL-MCNC: 8.9 MG/DL (ref 8.5–10.1)
CHLORIDE SERPL-SCNC: 106 MMOL/L (ref 97–108)
CO2 SERPL-SCNC: 24 MMOL/L (ref 21–32)
CREAT SERPL-MCNC: 0.81 MG/DL (ref 0.7–1.3)
ERYTHROCYTE [DISTWIDTH] IN BLOOD BY AUTOMATED COUNT: 14.2 % (ref 11.5–14.5)
GLUCOSE BLD STRIP.AUTO-MCNC: 154 MG/DL (ref 65–117)
GLUCOSE BLD STRIP.AUTO-MCNC: 158 MG/DL (ref 65–117)
GLUCOSE BLD STRIP.AUTO-MCNC: 186 MG/DL (ref 65–117)
GLUCOSE BLD STRIP.AUTO-MCNC: 191 MG/DL (ref 65–117)
GLUCOSE SERPL-MCNC: 223 MG/DL (ref 65–100)
HCT VFR BLD AUTO: 29.8 % (ref 36.6–50.3)
HGB BLD-MCNC: 9.4 G/DL (ref 12.1–17)
MCH RBC QN AUTO: 26.7 PG (ref 26–34)
MCHC RBC AUTO-ENTMCNC: 31.5 G/DL (ref 30–36.5)
MCV RBC AUTO: 84.7 FL (ref 80–99)
NRBC # BLD: 0 K/UL (ref 0–0.01)
NRBC BLD-RTO: 0 PER 100 WBC
PLATELET # BLD AUTO: 68 K/UL (ref 150–400)
PMV BLD AUTO: 10.9 FL (ref 8.9–12.9)
POTASSIUM SERPL-SCNC: 4.1 MMOL/L (ref 3.5–5.1)
RBC # BLD AUTO: 3.52 M/UL (ref 4.1–5.7)
SERVICE CMNT-IMP: ABNORMAL
SODIUM SERPL-SCNC: 135 MMOL/L (ref 136–145)
WBC # BLD AUTO: 3.6 K/UL (ref 4.1–11.1)

## 2021-10-17 PROCEDURE — 80048 BASIC METABOLIC PNL TOTAL CA: CPT

## 2021-10-17 PROCEDURE — 74011000258 HC RX REV CODE- 258: Performed by: INTERNAL MEDICINE

## 2021-10-17 PROCEDURE — 36415 COLL VENOUS BLD VENIPUNCTURE: CPT

## 2021-10-17 PROCEDURE — 82962 GLUCOSE BLOOD TEST: CPT

## 2021-10-17 PROCEDURE — 74011636637 HC RX REV CODE- 636/637: Performed by: INTERNAL MEDICINE

## 2021-10-17 PROCEDURE — 65270000029 HC RM PRIVATE

## 2021-10-17 PROCEDURE — 85027 COMPLETE CBC AUTOMATED: CPT

## 2021-10-17 PROCEDURE — 74011250637 HC RX REV CODE- 250/637: Performed by: INTERNAL MEDICINE

## 2021-10-17 PROCEDURE — 74011250636 HC RX REV CODE- 250/636: Performed by: INTERNAL MEDICINE

## 2021-10-17 PROCEDURE — 2709999900 HC NON-CHARGEABLE SUPPLY

## 2021-10-17 RX ORDER — ROSUVASTATIN CALCIUM 10 MG/1
10 TABLET, COATED ORAL
Status: DISCONTINUED | OUTPATIENT
Start: 2021-10-17 | End: 2021-10-20 | Stop reason: HOSPADM

## 2021-10-17 RX ORDER — HYDRALAZINE HYDROCHLORIDE 20 MG/ML
20 INJECTION INTRAMUSCULAR; INTRAVENOUS ONCE
Status: COMPLETED | OUTPATIENT
Start: 2021-10-17 | End: 2021-10-17

## 2021-10-17 RX ORDER — LOSARTAN POTASSIUM 100 MG/1
100 TABLET ORAL DAILY
Status: DISCONTINUED | OUTPATIENT
Start: 2021-10-17 | End: 2021-10-20 | Stop reason: HOSPADM

## 2021-10-17 RX ADMIN — PIPERACILLIN AND TAZOBACTAM 3.38 G: 3; .375 INJECTION, POWDER, LYOPHILIZED, FOR SOLUTION INTRAVENOUS at 17:16

## 2021-10-17 RX ADMIN — HYDRALAZINE HYDROCHLORIDE 10 MG: 20 INJECTION INTRAMUSCULAR; INTRAVENOUS at 04:30

## 2021-10-17 RX ADMIN — ROSUVASTATIN CALCIUM 10 MG: 10 TABLET, COATED ORAL at 22:32

## 2021-10-17 RX ADMIN — INSULIN LISPRO 2 UNITS: 100 INJECTION, SOLUTION INTRAVENOUS; SUBCUTANEOUS at 12:24

## 2021-10-17 RX ADMIN — INSULIN LISPRO 2 UNITS: 100 INJECTION, SOLUTION INTRAVENOUS; SUBCUTANEOUS at 17:16

## 2021-10-17 RX ADMIN — INSULIN LISPRO 2 UNITS: 100 INJECTION, SOLUTION INTRAVENOUS; SUBCUTANEOUS at 08:18

## 2021-10-17 RX ADMIN — HYDRALAZINE HYDROCHLORIDE 20 MG: 20 INJECTION INTRAMUSCULAR; INTRAVENOUS at 10:20

## 2021-10-17 RX ADMIN — Medication 10 ML: at 06:00

## 2021-10-17 RX ADMIN — Medication 10 ML: at 22:33

## 2021-10-17 RX ADMIN — VANCOMYCIN HYDROCHLORIDE 1000 MG: 1 INJECTION, POWDER, LYOPHILIZED, FOR SOLUTION INTRAVENOUS at 09:32

## 2021-10-17 RX ADMIN — VANCOMYCIN HYDROCHLORIDE 1000 MG: 1 INJECTION, POWDER, LYOPHILIZED, FOR SOLUTION INTRAVENOUS at 22:33

## 2021-10-17 RX ADMIN — Medication 10 ML: at 15:06

## 2021-10-17 RX ADMIN — PIPERACILLIN AND TAZOBACTAM 3.38 G: 3; .375 INJECTION, POWDER, LYOPHILIZED, FOR SOLUTION INTRAVENOUS at 10:45

## 2021-10-17 RX ADMIN — LOSARTAN POTASSIUM 100 MG: 100 TABLET, FILM COATED ORAL at 10:19

## 2021-10-17 NOTE — PROGRESS NOTES
Pharmacy Automatic Renal Dosing Protocol - Antimicrobials    Indication for Antimicrobials: OM      Current Regimen of Each Antimicrobial:  1250 mg x 1 (10/16)  Vancomycin Pharmacy to Dose Consult - 10/17 - Day 1    Previous Antimicrobial Therapy:    Vancomycin Goal Level: 400-600 mg*hour/L per 24 hours    Vancomycin Levels  Date Dose & Interval Measured (mcg/mL) Steady State (mcg/mL)                       Date & time of next level:     Significant Cultures:     10/15 Blood - NG  10/16 Tissue - NG  10/16 Anaerobic - Pending results      Radiology / Imaging results: (X-ray, CT scan or MRI):       Labs:  Recent Labs     10/17/21  0045 10/16/21  0555 10/15/21  1817   CREA 0.81 0.84 0.83   BUN 19 20 21*   WBC 3.6* 2.6* 5.7     Temp (24hrs), Av °F (36.7 °C), Min:97.7 °F (36.5 °C), Max:98.6 °F (37 °C)      Paralysis, amputations, malnutrition:   Creatinine Clearance (mL/min) or Dialysis: 85.7    Impression/Plan:   Vancomycin 1000 mg IV Q12H. Once dose given yesterday, so not loading. Expected , trough 16.2 mcg/ml  BMP daily     Pharmacy will follow daily and adjust medications as appropriate for renal function and/or serum levels. Thank you,  Mil Reid, PHARMD      Recommended duration of therapy  [email or web address]/Pharmacy/Clinical%20Companion/Duration%20of%20ABX%20therapy. docx    Renal Dosing  [email or web address]/[place]/[facility]/Pharmacy/Clinical%20Companion/Renal%[email or web address]. pdf

## 2021-10-17 NOTE — PROGRESS NOTES
Hospitalist Progress Note    NAME: José Miguel Perez   :  1945   MRN:  [id number]       Assessment / Plan:  Right great toe OM, diabetic foot infection POA  Possible septic tenosynovitis within flexor hallucis longus  S/p Rt great to amputation at metatarsal joint POD 1 (surgery 10/16)  -recent admission 2021 for right first great toe OM, was sent home with IV antibiotics, MRI foot showed worsening of infection  -cont vancomycin and IV Zosyn for now  -await surgical path and cultures  -pain currently well controlled     DM II  -A1c 6.5 , 2021, no need to repeat again  -A1c at goal  -BS somewhat elevated in low 200s, not quite at in hospital goal. May be d/t stress of surgery/infection and 2/2 counterregulatory hormones  -cont SSI, consider adding basal insulin pending BS trend  -Hold Metformin     Severe aortic Stenosis severe with moderate AI POA  s/p TAVR 3/24/21 w/ Dr. Abram Ricks and Dr. Jana Gomez. Uncontrolled HTN  -sbp up to 200 this am!  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 Denies pain, fevers/chills    discussed with RN events overnight.      Review of Systems:  Symptom Y/N Comments  Symptom Y/N Comments   Fever/Chills n   Chest Pain n    Poor Appetite n   Edema     Cough n   Abdominal Pain n    Sputum n   Joint Pain     SOB/MANCIA n   Pruritis/Rash     Nausea/vomit n   Tolerating PT/OT     Diarrhea n   Tolerating Diet     Constipation n   Other       Could NOT obtain due to:      Objective:     VITALS:   Last 24hrs VS reviewed since prior progress note. Most recent are:  Patient Vitals for the past 24 hrs:   Temp Pulse Resp BP SpO2   10/17/21 0323 97.7 °F (36.5 °C) 86 18 (!) 196/96 98 %   10/16/21 2342 97.9 °F (36.6 °C) 90 18 (!) 161/70 99 %   10/16/21 2036 98 °F (36.7 °C) 94 18 (!) 174/76 99 %   10/16/21 1526 98.2 °F (36.8 °C) 98 18 (!) 168/70 99 %   10/16/21 1245 -- -- -- (!) 171/76 --   10/16/21 1228 97.7 °F (36.5 °C) 75 18 (!) 178/82 100 %   10/16/21 1200 -- 76 19 (!) 154/78 97 %   10/16/21 1145 -- 77 16 (!) 143/71 98 %   10/16/21 1140 -- 79 20 (!) 145/74 100 %   10/16/21 1135 -- 78 14 124/74 100 %   10/16/21 1130 -- 78 18 130/63 99 %   10/16/21 1129 97.7 °F (36.5 °C) 77 23 119/68 99 %       Intake/Output Summary (Last 24 hours) at 10/17/2021 0952  Last data filed at 10/17/2021 0437  Gross per 24 hour   Intake 1350 ml   Output 455 ml   Net 895 ml        I had a face to face encounter and independently examined this patient on 10/17/2021, as outlined below:  PHYSICAL EXAM:  General: WD, WN. Alert, cooperative, no acute distress    EENT:  EOMI. Anicteric sclerae. MMM  Resp:  CTA bilaterally, no wheezing or rales. No accessory muscle use  CV:  Regular  rhythm,  No edema  GI:  Soft, Non distended, Non tender. +Bowel sounds  Neurologic:  Alert and oriented X 3, normal speech,   Psych:   Good insight. Not anxious nor agitated  Skin:  No rashes.   No jaundice, +dressing Rt foot    Reviewed most current lab test results and cultures  YES  Reviewed most current radiology test results   YES  Review and summation of old records today    NO  Reviewed patient's current orders and MAR    YES  PMH/ reviewed - no change compared to H&P  ________________________________________________________________________  Care Plan discussed with:    Comments   Patient x    Family      RN x    Care Manager     Consultant                        Multidiciplinary team rounds were held today with , nursing, pharmacist and clinical coordinator. Patient's plan of care was discussed; medications were reviewed and discharge planning was addressed. ________________________________________________________________________  Total NON critical care TIME:  35   Minutes    Total CRITICAL CARE TIME Spent:   Minutes non procedure based      Comments   >50% of visit spent in counseling and coordination of care x    ________________________________________________________________________  Amberly Pickett DO     Procedures: see electronic medical records for all procedures/Xrays and details which were not copied into this note but were reviewed prior to creation of Plan. LABS:  I reviewed today's most current labs and imaging studies.   Pertinent labs include:  Recent Labs     10/17/21  0045 10/16/21  0555 10/15/21  1817   WBC 3.6* 2.6* 5.7   HGB 9.4* 9.4* 10.9*   HCT 29.8* 28.9* 34.1*   PLT 68* 55* 99*     Recent Labs     10/17/21  0045 10/16/21  0555 10/15/21  1817   * 139 138   K 4.1 3.9 3.8    109* 108   CO2 24 25 23   * 135* 112*   BUN 19 20 21*   CREA 0.81 0.84 0.83   CA 8.9 8.8 9.4   ALB  --   --  3.4*   TBILI  --   --  0.7   ALT  --   --  59   INR  --   --  1.2*       Signed: Nathan Graves DO

## 2021-10-17 NOTE — PROGRESS NOTES
Transition of Care Plan:    RUR:  15%  Disposition:  Home with [facility]  Follow up appointments:  PCP, Specialists  DME needed:  Pt does not use any DME at baseline. Transportation at [place] Resources or means to access home:      Spouse will provide  IM Medicare Letter: needed at d/c  Is patient a BCPI-A Bundle:        Floor CM will provide if needed. If yes, was Bundle Letter given?:     Caregiver Contact:Elidia Dumont [phone]  Discharge Caregiver contacted prior to discharge? CM will contact prior to d/c if pt desires. Reason for Readmission:     Osteomyelitis         RUR Score/Risk Level:     15%    PCP: First and Last name:  Dr. Ketan Sylvester    Name of Practice:    Are you a current patient: Yes/No: yes   Approximate date of last visit: a year ago   Can you participate in a virtual visit with your PCP: yes    Is a Care Conference indicated: tbd      Did you attend your follow up appointment (s): If not, why not:  Pt saw his specialists but reported he did not have enough time to see his PCP. Resources/supports as identified by patient/family:          Top Challenges facing patient (as identified by patient/family and CM): Finances/Medication cost?   Pt is insured by [employer] and [employer]. Transportation    Pt's spouse can transport. Support system or lack thereof? Pt has an adequate support system. Living arrangements? Pt lives with his spouse. Self-care/ADLs/Cognition? Pt is alert and oriented x4. Pt can complete his ADLs independently. Current Advanced Directive/Advance Care Plan:             Plan for utilizing home health:   [facility]              Transition of Care Plan:    Based on readmission, the patient's previous Plan of Care   has been evaluated and/or modified.  The current Transition of Care Plan is:     Home with [facility]     CM met with pt at bedside to introduce self/role, verify demographics, insurance and PCP. CM also discussed d/c plan. Pt is a 79 yo, ,  male who was admitted to [facility] on 10/15/21 with a dx of Osteomyelitis. Pt sees his PCP every 6 months. Pt uses the [facility] pharmacy. Pt lives with his spouse in a 2 fl home with 3 NEGRA. Pt has a supportive son and daughter. Pt can complete his ADLs/IADL care independently at baseline. Pt does drive and works full time. Pt does not use any DME. Pt denied a hx of HH, SNF or IPR. Pt's spouse can transport at d/c. CM will continue to assess for d/c needs. Care Management Interventions  PCP Verified by CM: Yes (Pt sees Dr. Deb Cornejo.)  Palliative Care Criteria Met (RRAT>21 & CHF Dx)?: No  Mode of Transport at Discharge:  Other (see comment) (Pt's spouse)  Transition of Care Consult (CM Consult): Discharge Planning  Discharge Durable Medical Equipment: No  Physical Therapy Consult: No  Occupational Therapy Consult: No  Speech Therapy Consult: No  Support Systems: Spouse/Significant Other, Child(carissa)  Confirm Follow Up Transport: Self  The Patient and/or Patient Representative was Provided with a Choice of Provider and Agrees with the Discharge Plan?: Yes  Name of the Patient Representative Who was Provided with a Choice of Provider and Agrees with the Discharge Plan: Vivien [place]  Elim of Choice List was Provided with Basic Dialogue that Supports the Patient's Individualized Plan of Care/Goals, Treatment Preferences and Shares the Quality Data Associated with the Providers?: Yes  Discharge Location  Discharge Placement: Home with home health    Readmission Assessment  Number of days since last admission?: 8-30 days  Previous disposition: Home with Home Health  Who is being interviewed?: Patient  What was the patient's/caregiver's perception as to why they think they needed to return back to the hospital?: Other (Comment) (Pt reported his foot wound wouild not heal and it had an increase in swelling.)  Did you visit your Primary Care Physician after you left the hospital, before you returned this time?: No  Why weren't you able to visit your PCP?: Other (Comment) (Pt had too many other specialists to see.)  Did you see a specialist, such as Cardiac, Pulmonary, Orthopedic Physician, etc. after you left the hospital?: Yes  Who advised the patient to return to the hospital?: Physician (Dr. Abbi Hurtado)  Does the patient report anything that got in the way of taking their medications?: No  In our efforts to provide the best possible care to you and others like you, can you think of anything that we could have done to help you after you left the hospital the first time, so that you might not have needed to return so soon?: Other (Comment) (foot didn't heal and started to swell; nothing we could have done to prevent it)    Lolita Jc MSW  Care Management, [address] [facility]

## 2021-10-17 NOTE — PROGRESS NOTES
Podiatry    Subjective: Patient has 1 year Hx of non-healing wound right hallux treated by another doctor. At his previous admission I debrided the wound and bone and he was discharged with [place] and IV ABX directed by Dr. Letitia Shepherd. A repeat MRI was ordered recently due to lack of improvement, and the patient was asked to return for admission when significant adverse changes were seen on the images. He is now POD#1 amputation right hallux at the [facility]. Patient is a 78 y.o.  male who is being seen for osteomyelitis right hallux. Patient Active Problem List    Diagnosis Date Noted    Osteomyelitis ([facility] 10/15/2021    Diabetic foot ([facility] 2021    S/P TAVR (transcatheter aortic valve replacement) 2021    Aortic stenosis 2021    Severe aortic stenosis 2021    GIB (gastrointestinal bleeding) 2021     Past Medical History:   Diagnosis Date    Aortic regurgitation     Aortic stenosis     Atrial fibrillation (HCC)     Colon polyps     Diabetes mellitus, type 2 (HCC)     GI bleed     HTN (hypertension)       Past Surgical History:   Procedure Laterality Date    COLONOSCOPY N/A 3/16/2021    [facility] END[facility]    HX AFIB ABLATION  2021      History reviewed. No pertinent family history.    Social History     Tobacco Use    Smoking status: Former Smoker     Types: Cigarettes     Quit date:      Years since quittin.8    Smokeless tobacco: Never Used   Substance Use Topics    Alcohol use: Not on file     Current Facility-Administered Medications   Medication Dose Route Frequency Provider Last Rate Last Admin    influenza vaccine  (6 mos+)(PF) (FLUARIX/FLULAVAL/FLUZONE QUAD) injection 0.5 mL  1 Each IntraMUSCular PRIOR TO DISCHARGE August Graves DO        piperacillin-tazobactam (ZOSYN) 3.375 g in 0.9% sodium chloride (MBP/ADV) 100 mL MBP  3.375 g IntraVENous Q8H August Graves DO 25 mL/hr at 10/17/21 1045 3.375 g at 10/17/21 1045    vancomycin (VANCOCIN) 1,000 mg in 0.9% sodium chloride 250 mL (VIAL-MATE)  1,000 mg IntraVENous Q12H Asha Model,  mL/hr at 10/17/21 0932 1,000 mg at 10/17/21 0932    rosuvastatin (CRESTOR) tablet 10 mg  10 mg Oral QHS August Graves, DO        losartan (COZAAR) tablet 100 mg  100 mg Oral DAILY Asha Model, DO   100 mg at 10/17/21 1019    oxyCODONE IR (ROXICODONE) tablet 5 mg  5 mg Oral Q4H PRN Nish Echeverria Erm, DPM        ondansetron (ZOFRAN ODT) tablet 4 mg  4 mg Oral Q8H PRN August Graves, DO        [facility]ron [facility] injection 4 mg  4 mg IntraVENous Q6H PRN August Graves, DO        polyethylene glycol (MIRALAX) packet 17 g  17 g Oral DAILY PRN August Graves, DO        sodium chloride (NS) flush 5-40 mL  5-40 mL IntraVENous Q8H August Graves, DO   10 mL at 10/17/21 0600    sodium chloride (NS) flush 5-40 mL  5-40 mL IntraVENous PRN August Graves, DO        acetaminophen (TYLENOL) tablet 650 mg  650 mg Oral Q6H PRN August Graves, DO        Or    acetaminophen (TYLENOL) suppository 650 mg  650 mg Rectal Q6H PRN August Graves, DO        polyethylene glycol (MIRALAX) packet 17 g  17 g Oral DAILY PRN August Graves, DO        hydrALAZINE (APRESOLINE) 20 mg/mL injection 10 mg  10 mg IntraVENous Q6H PRN August Graves, DO   10 mg at 10/17/21 0430    glucose chewable tablet 16 g  4 Tablet Oral PRN Asha Enrique M, DO        dextrose (D50W) injection syrg 12.5-25 g  25-50 mL IntraVENous PRN August Graves, DO        glucagon (GLUCAGEN) injection 1 mg  1 mg IntraMUSCular PRN August Graves,         insulin lispro (HUMALOG) injection   SubCUTAneous AC&HS Asha Enrique, DO   2 Units at 10/17/21 0818      No Known Allergies     Review of Systems:  AO X4. NAD.  Denies fever, chills, nausea or vomiting. Objective:     Patient Vitals for the past 8 hrs:   BP Temp Pulse Resp SpO2   10/17/21 1041 (!) 162/75 98.1 °F (36.7 °C) (!) 101 18 99 %   10/17/21 0834 (!) 203/94 97.3 °F (36.3 °C) (!) 103 18 100 %     Temp (24hrs), Av.8 °F (36.6 °C), Min:97.3 °F (36.3 °C), Max:98.2 °F (36.8 °C)      Physical Exam Lower Extremities:    Dressings removed. Incision and sutures intact, no active bleeding. Skin is pink and viable. DP and PT 2/4; CFT less than 3 seconds  Sensation absent to fine touch at the level of the toes.   Right hallux absent    Lab Review:   Recent Results (from the past 24 hour(s))   GLUCOSE, POC    Collection Time: 10/16/21  4:52 PM   Result Value Ref Range    Glucose (POC) 255 (H) 65 - 117 mg/dL    Performed by Annemarie Morrison RN    GLUCOSE, POC    Collection Time: 10/16/21  9:44 PM   Result Value Ref Range    Glucose (POC) 239 (H) 65 - 117 mg/dL    Performed by [facility]*    METABOLIC PANEL, BASIC    Collection Time: 10/17/21 12:45 AM   Result Value Ref Range    Sodium 135 (L) 136 - 145 mmol/L    Potassium 4.1 3.5 - 5.1 mmol/L    Chloride 106 97 - 108 mmol/L    CO2 24 21 - 32 mmol/L    Anion gap 5 5 - 15 mmol/L    Glucose 223 (H) 65 - 100 mg/dL    BUN 19 6 - 20 MG/DL    Creatinine 0.81 0.70 - 1.30 MG/DL    BUN/Creatinine ratio 23 (H) 12 - 20      GFR est AA >60 >60 ml/min/1.73m2    GFR est non-AA >60 >60 ml/min/1.73m2    Calcium 8.9 8.5 - 10.1 MG/DL   CBC W/O DIFF    Collection Time: 10/17/21 12:45 AM   Result Value Ref Range    WBC 3.6 (L) 4.1 - 11.1 K/uL    RBC 3.52 (L) 4.10 - 5.70 M/uL    HGB 9.4 (L) 12.1 - 17.0 g/dL    HCT 29.8 (L) 36.6 - 50.3 %    MCV 84.7 80.0 - 99.0 FL    MCH 26.7 26.0 - 34.0 PG    MCHC 31.5 30.0 - 36.5 g/dL    RDW 14.2 11.5 - 14.5 %    PLATELET 68 (L) 969 - 400 K/uL    MPV 10.9 8.9 - 12.9 FL    NRBC 0.0 0  WBC    ABSOLUTE NRBC 0.00 0.00 - 0.01 K/uL   GLUCOSE, POC    Collection Time: 10/17/21  7:11 AM   Result Value Ref Range    Glucose (POC) 154 (H) 65 - 117 mg/dL    Performed by Gwendolyn Styles (SARAHI RN)        Impression:   1. Osteomyelitis right hallux  2. New onset of right hallux infectious tenosynovitis  3. POD #1, Amputation right hallux    Recommendation:   Dressings changed. Awaiting Path and C&S. If the bone margins are clear, then he will need 10-14 days of ABX from the date of Sx but can otherwise be discharged home. Dr. Natasha Springer was seeing the patient for management of IV ABX and could be consulted again.

## 2021-10-18 LAB
GLUCOSE BLD STRIP.AUTO-MCNC: 120 MG/DL (ref 65–117)
GLUCOSE BLD STRIP.AUTO-MCNC: 138 MG/DL (ref 65–117)
GLUCOSE BLD STRIP.AUTO-MCNC: 152 MG/DL (ref 65–117)
GLUCOSE BLD STRIP.AUTO-MCNC: 183 MG/DL (ref 65–117)
SERVICE CMNT-IMP: ABNORMAL

## 2021-10-18 PROCEDURE — 2709999900 HC NON-CHARGEABLE SUPPLY

## 2021-10-18 PROCEDURE — 74011000258 HC RX REV CODE- 258: Performed by: INTERNAL MEDICINE

## 2021-10-18 PROCEDURE — 74011250636 HC RX REV CODE- 250/636: Performed by: INTERNAL MEDICINE

## 2021-10-18 PROCEDURE — 65270000029 HC RM PRIVATE

## 2021-10-18 PROCEDURE — 74011636637 HC RX REV CODE- 636/637: Performed by: INTERNAL MEDICINE

## 2021-10-18 PROCEDURE — 82962 GLUCOSE BLOOD TEST: CPT

## 2021-10-18 PROCEDURE — 74011250637 HC RX REV CODE- 250/637: Performed by: INTERNAL MEDICINE

## 2021-10-18 RX ORDER — AMLODIPINE BESYLATE 5 MG/1
5 TABLET ORAL DAILY
Status: DISCONTINUED | OUTPATIENT
Start: 2021-10-18 | End: 2021-10-20 | Stop reason: HOSPADM

## 2021-10-18 RX ORDER — METRONIDAZOLE 500 MG/100ML
500 INJECTION, SOLUTION INTRAVENOUS EVERY 8 HOURS
Status: DISCONTINUED | OUTPATIENT
Start: 2021-10-18 | End: 2021-10-20 | Stop reason: HOSPADM

## 2021-10-18 RX ADMIN — VANCOMYCIN HYDROCHLORIDE 1000 MG: 1 INJECTION, POWDER, LYOPHILIZED, FOR SOLUTION INTRAVENOUS at 10:15

## 2021-10-18 RX ADMIN — Medication 10 ML: at 13:36

## 2021-10-18 RX ADMIN — METRONIDAZOLE 500 MG: 500 INJECTION, SOLUTION INTRAVENOUS at 17:01

## 2021-10-18 RX ADMIN — LOSARTAN POTASSIUM 100 MG: 100 TABLET, FILM COATED ORAL at 10:05

## 2021-10-18 RX ADMIN — CEFEPIME HYDROCHLORIDE 2 G: 2 INJECTION, POWDER, FOR SOLUTION INTRAVENOUS at 14:20

## 2021-10-18 RX ADMIN — AMLODIPINE BESYLATE 5 MG: 5 TABLET ORAL at 11:00

## 2021-10-18 RX ADMIN — ROSUVASTATIN CALCIUM 10 MG: 10 TABLET, COATED ORAL at 22:34

## 2021-10-18 RX ADMIN — PIPERACILLIN AND TAZOBACTAM 3.38 G: 3; .375 INJECTION, POWDER, LYOPHILIZED, FOR SOLUTION INTRAVENOUS at 01:12

## 2021-10-18 RX ADMIN — CEFEPIME HYDROCHLORIDE 2 G: 2 INJECTION, POWDER, FOR SOLUTION INTRAVENOUS at 22:34

## 2021-10-18 RX ADMIN — PIPERACILLIN AND TAZOBACTAM 3.38 G: 3; .375 INJECTION, POWDER, LYOPHILIZED, FOR SOLUTION INTRAVENOUS at 10:06

## 2021-10-18 RX ADMIN — METRONIDAZOLE 500 MG: 500 INJECTION, SOLUTION INTRAVENOUS at 22:36

## 2021-10-18 RX ADMIN — Medication 10 ML: at 22:34

## 2021-10-18 RX ADMIN — INSULIN LISPRO 2 UNITS: 100 INJECTION, SOLUTION INTRAVENOUS; SUBCUTANEOUS at 12:37

## 2021-10-18 NOTE — PROGRESS NOTES
Podiatry    Subjective: Patient has 1 year Hx of non-healing wound right hallux treated by another doctor. At his previous admission I debrided the wound and bone and he was discharged with [facility] and IV ABX directed by Dr. Remy Mejia. A repeat MRI was ordered recently due to lack of improvement, and the patient was asked to return for admission when significant adverse changes were seen on the images. He is now POD# 2 amputation right hallux at the [facility]. Patient is a 78 y.o.  male who is being seen for osteomyelitis right hallux. Patient Active Problem List    Diagnosis Date Noted    Osteomyelitis ([facility] [address] 10/15/2021    Diabetic foot ([facility] [address] 2021    S/P TAVR (transcatheter aortic valve replacement) 2021    Aortic stenosis 2021    Severe aortic stenosis 2021    GIB (gastrointestinal bleeding) 2021     Past Medical History:   Diagnosis Date    Aortic regurgitation     Aortic stenosis     Atrial fibrillation (HCC)     Colon polyps     Diabetes mellitus, type 2 (HCC)     GI bleed     HTN (hypertension)       Past Surgical History:   Procedure Laterality Date    COLONOSCOPY N/A 3/16/2021    COLONOSCOPY performed by Gwen Higgins MD at [address] [place] [address] ENDOSCOPY    HX AFIB ABLATION  2021      History reviewed. No pertinent family history.    Social History     Tobacco Use    Smoking status: Former Smoker     Types: Cigarettes     Quit date:      Years since quittin.8    Smokeless tobacco: Never Used   Substance Use Topics    Alcohol use: Not on file     Current Facility-Administered Medications   Medication Dose Route Frequency Provider Last Rate Last Admin    amLODIPine (NORVASC) tablet 5 mg  5 mg Oral DAILY August Graves DO   5 mg at 10/18/21 1100    cefepime (MAXIPIME) 2 g in 0.9% sodium chloride (MBP/ADV) 100 mL MBP  2 g IntraVENous Q8H August Graves  mL/hr at 10/18/21 1420 2 g at 10/18/21 1420    metroNIDAZOLE (FLAGYL) IVPB premix 500 mg  500 mg IntraVENous Q8H Elois Orn,  mL/hr at 10/18/21 1701 500 mg at 10/18/21 1701    influenza vaccine 2021-22 (6 mos+)(PF) (FLUARIX/FLULAVAL/FLUZONE QUAD) injection 0.5 mL  1 Each IntraMUSCular PRIOR TO DISCHARGE August Graves DO        rosuvastatin (CRESTOR) tablet 10 mg  10 mg Oral QHS Elois Orn, DO   10 mg at 10/17/21 2232    losartan (COZAAR) tablet 100 mg  100 mg Oral DAILY Elois Orn, DO   100 mg at 10/18/21 1005    oxyCODONE IR (ROXICODONE) tablet 5 mg  5 mg Oral Q4H PRN BelSchuyler alejandre Sauce, DPM        ondansetron (ZOFRAN ODT) tablet 4 mg  4 mg Oral Q8H PRN August Graves DO        ondansetron [facility]) injection 4 mg  4 mg IntraVENous Q6H PRN August Graves DO        sodium chloride (NS) flush 5-40 mL  5-40 mL IntraVENous Q8H August Graves, DO   10 mL at 10/18/21 1336    sodium chloride (NS) flush 5-40 mL  5-40 mL IntraVENous PRN August Graves DO        acetaminophen (TYLENOL) tablet 650 mg  650 mg Oral Q6H PRN August Graves DO        Or    acetaminophen (TYLENOL) suppository 650 mg  650 mg Rectal Q6H PRN August Graves DO        polyethylene glycol (MIRALAX) packet 17 g  17 g Oral DAILY PRN August Graves DO        hydrALAZINE (APRESOLINE) 20 mg/mL injection 10 mg  10 mg IntraVENous Q6H PRN Vladimir Drummond, DO   10 mg at 10/17/21 0430    glucose chewable tablet 16 g  4 Tablet Oral PRN Vladimir KHAN DO        dextrose (D50W) injection syrg 12.5-25 g  25-50 mL IntraVENous PRN August Graves DO        glucagon (GLUCAGEN) injection 1 mg  1 mg IntraMUSCular PRN Worthing, Christopher M, DO        insulin lispro (HUMALOG) injection   SubCUTAneous AC&HS Vladimir Orn, DO   2 Units at 10/18/21 1237      No Known Allergies     Review of Systems:  AO X4. NAD. Denies fever, chills, nausea or vomiting.     Objective:     Patient Vitals for the past 8 hrs:   BP Temp Pulse Resp SpO2   10/18/21 1503 (!) 148/64 98.6 °F (37 °C) 84 18 98 %   10/18/21 1134 (!) 162/77 98 °F (36.7 °C) 78 18 98 %     Temp (24hrs), Av.1 °F (36.7 °C), Min:97.4 °F (36.3 °C), Max:98.6 °F (37 °C)      Physical Exam Lower Extremities:    Dressings removed. Incision and sutures intact, light bleeding on bandage. Skin is pink and viable. DP and PT 2/4; CFT less than 3 seconds  Sensation absent to fine touch at the level of the toes. Right hallux absent    Lab Review:   Recent Results (from the past 24 hour(s))   GLUCOSE, POC    Collection Time: 10/17/21  9:15 PM   Result Value Ref Range    Glucose (POC) 158 (H) 65 - 117 mg/dL    Performed by Annia Garcia PCT    GLUCOSE, POC    Collection Time: 10/18/21  6:09 AM   Result Value Ref Range    Glucose (POC) 120 (H) 65 - 117 mg/dL    Performed by Summer Berry*    GLUCOSE, POC    Collection Time: 10/18/21 11:48 AM   Result Value Ref Range    Glucose (POC) 152 (H) 65 - 117 mg/dL    Performed by Mesha De La Torre PCT    GLUCOSE, POC    Collection Time: 10/18/21  4:43 PM   Result Value Ref Range    Glucose (POC) 138 (H) 65 - 117 mg/dL    Performed by Madison Lincoln        Impression:   1. Osteomyelitis right hallux  2. New onset of right hallux infectious tenosynovitis  3. POD #2, Amputation right hallux    Recommendation:   Dressings changed. Awaiting Path and C&S. If the bone margins are clear, then he will need 10-14 days of ABX from the date of Sx but can otherwise be discharged home. Dr. Monika Ochoa was seeing the patient for management of IV ABX and could be consulted again. I will be out of town for then next 2 weeks, but others from my group will be following him. Call [phone], ask for Dr. Kay Chung or Dr. Nola Quinonez. Patient informed.

## 2021-10-18 NOTE — PROGRESS NOTES
4470 - Messaged MD. Bird Richardson patient's blood pressure was 203/94 this morning when we took it. I cannot give the hydralazine because it was given at 4:30 this morning. Is there anything else you want me to give him? Thank you. \"    4229 - Messaged MD. Carolyn Barcenas you please put in hydralazine order? Thank you. \"    End of Shift Note    Bedside shift change report given to RACHELE Stephens (oncoming nurse) by Guillermina Camarillo RN (offgoing nurse). Report included the following information SBAR, Kardex, Intake/Output and MAR    Shift worked:  7am-7pm     Shift summary and any significant changes:     Pt ambulated to the bathroom once during the shift and tolerated the activity well. Pt is tolerating the current diet. Pt did not complain of pain during the shift only slight tingling in the foot when wiggling toes. Pt had a BM during the shift. Pt educated on how to use incentive spirometerPt had an uneventful shift. Concerns for physician to address:       Zone phone for oncoming shift:          Activity:  Activity Level: Up ad xu, Bath Room Privileges  Number times ambulated in hallways past shift: 0  Number of times OOB to chair past shift: 0    Cardiac:   Cardiac Monitoring: No      Cardiac Rhythm: Sinus Rhythm    Access:   Current line(s): PIV and PICC    Genitourinary:   Urinary status: voiding    Respiratory:   O2 Device: None (Room air)  Chronic home O2 use?: NO  Incentive spirometer at bedside: YES     GI:  Last Bowel Movement Date: 10/17/21  Current diet:  ADULT DIET Regular; 4 carb choices (60 gm/meal)  Passing flatus: YES  Tolerating current diet: YES       Pain Management:   Patient states pain is manageable on current regimen: YES    Skin:  Lux Score: 21  Interventions: float heels and increase time out of bed    Patient Safety:  Fall Score:  Total Score: 1  Interventions: gripper socks and pt to call before getting OOB       Length of Stay:  Expected LOS: - - -  Actual LOS: [address], RN

## 2021-10-18 NOTE — PROGRESS NOTES
Transition of Care Plan:     RUR:  16% \"low risk\"  Disposition:  Home with [facility] of [facility] ([facility])  Follow up appointments:  PCP, Specialists  DME needed:  Pt does not use any DME at baseline  Transportation at Discharge: [address] or means to access home:  Spouse will provide  IM Medicare Letter: Needed at d/c  Is patient a BCPI-A Bundle: No              If yes, was Bundle Letter given?: N/A     Caregiver Contact:Wife- Ryan Nunn, [phone]  Discharge Caregiver contacted prior to discharge? CM reviewed pt's chart. Path/ cultures currently pending & not ready to d/c home. Pt will discharge home on IV abx; ID consulted. Will send verito order to [facility] prior to d/c. Will send IV abx order to infusion company. Will continue to follow.     Maynor Kent MSW  Care Management

## 2021-10-18 NOTE — PROGRESS NOTES
End of Shift Note    Bedside shift change report given to Orlando Sen RN (oncoming nurse) by Claudette Math, RN (offgoing nurse). Report included the following information SBAR, Kardex, Intake/Output, MAR and Recent Results    Shift worked: 7A-3P     Shift summary and any significant changes:     ABX administered, monitoring BS throughout shift. Drsg changed today     Concerns for physician to address: None      Zone phone for oncoming shift:  [phone]       Activity:  Activity Level: Up with Assistance  Number times ambulated in hallways past shift: 0  Number of times OOB to chair past shift: 2    Cardiac:   Cardiac Monitoring: No      Cardiac Rhythm: Sinus Rhythm    Access:   Current line(s): PIV     Genitourinary:   Urinary status: voiding    Respiratory:   O2 Device: None (Room air)  Chronic home O2 use?: NO  Incentive spirometer at bedside: YES     GI:  Last Bowel Movement Date: 10/17/21  Current diet:  ADULT DIET Regular; 4 carb choices (60 gm/meal)  Passing flatus: YES  Tolerating current diet: YES       Pain Management:   Patient states pain is manageable on current regimen: YES    Skin:  Lux Score: 21  Interventions: increase time out of bed    Patient Safety:  Fall Score:  Total Score: 1  Interventions: stay with me (per policy)       Length of Stay:  Expected LOS: - - -  Actual LOS: [address], RN

## 2021-10-18 NOTE — PROGRESS NOTES
Hospitalist Progress Note    NAME: Cayetano Current   :  1945   MRN:  [id number]       Assessment / Plan:  Right great toe OM, diabetic foot infection POA  Possible septic tenosynovitis within flexor hallucis longus  S/p Rt great to amputation at metatarsal joint POD 1 (surgery 10/16)  -recent admission 2021 for right first great toe OM, was sent home with IV antibiotics, MRI foot showed worsening of infection  -s/p vancomycin and zosyn, no growth on cultures so far. On cefepime and metronidazole PTA based on prior cultures, following with ID outpatient. Surgical Cxs/blood Cxs NGTD  -path showing chronic osteo/ischemic necrosis from amputation fragments with viable soft tissue and bone at margins, no evidence of acute inflammation  -resume cefepime and metranidazole for now  -pain currently well controlled     DM II  -A1c 6.5 , 2021, no need to repeat again  -A1c at goal  -BS somewhat elevated in low 200s, not quite at in hospital goal. May be d/t stress of surgery/infection and 2/2 counterregulatory hormones  -cont SSI, consider adding basal insulin pending BS trend  -Hold Metformin     Severe aortic Stenosis severe with moderate AI POA  s/p TAVR 3/24/21 w/ Dr. Alicia Myers and Dr. Sudarshan Chen. Uncontrolled HTN  -sbp up to 200 this am!  -given IV hydralazine with improvement of SBP to 160s  -SBP has been in 140s-150s since his TAVR ob  -patient reports good pain control  -resume home losartan, monitor VS     Hyperlipidemia POA  Atrial fibrillation s/p Ablation in 2021  Essential HTN POA  -Currently NSR  Continue losartan,  Continue Statin      Code Status: Full code  Surrogate Decision Maker wife    Wife has many questions regarding plan of care, antibiotic therapy, patient's diabetes, HTN. Listened to her questions without interrupting and tried to address them. She requested an endocrinology consult which I don't believe is necessary at this time and explained my reasoning to her.  She becomes agitated at points and the patient became irritated with her. Ultimately, the patient is decisional and capable of understanding his medical issues, which were explained to his satisfaction. Code Kinc[place] was called earlier in admission when nursing became concerned that the wife was agitated and aggressive.      DVT Prophylaxis: SCD  GI Prophylaxis: not indicated     Baseline: high functioning 78 adult male, still works as a banker     Subjective:     Chief Complaint / Reason for Physician Visit  No significant events overnight, no new complaints    discussed with RN events overnight. Review of Systems:  Symptom Y/N Comments  Symptom Y/N Comments   Fever/Chills n   Chest Pain n    Poor Appetite n   Edema     Cough n   Abdominal Pain n    Sputum n   Joint Pain     SOB/MANCIA n   Pruritis/Rash     Nausea/vomit n   Tolerating PT/OT     Diarrhea n   Tolerating Diet     Constipation n   Other       Could NOT obtain due to:      Objective:     VITALS:   Last 24hrs VS reviewed since prior progress note. Most recent are:  Patient Vitals for the past 24 hrs:   Temp Pulse Resp BP SpO2   10/18/21 0826 97.4 °F (36.3 °C) 78 18 (!) 171/86 99 %   10/18/21 0359 97.8 °F (36.6 °C) 81 18 (!) 159/81 96 %   10/17/21 2355 98.4 °F (36.9 °C) 91 18 (!) 141/69 96 %   10/17/21 2008 98.4 °F (36.9 °C) 95 18 (!) 149/66 100 %   10/17/21 1459 98 °F (36.7 °C) 98 18 (!) 153/85 96 %   10/17/21 1041 98.1 °F (36.7 °C) (!) 101 18 (!) 162/75 99 %       Intake/Output Summary (Last 24 hours) at 10/18/2021 1002  Last data filed at 10/18/2021 0453  Gross per 24 hour   Intake 850 ml   Output 1300 ml   Net -450 ml        I had a face to face encounter and independently examined this patient on 10/18/2021, as outlined below:  PHYSICAL EXAM:  General: WD, WN. Alert, cooperative, no acute distress    EENT:  EOMI. Anicteric sclerae. MMM  Resp:  CTA bilaterally, no wheezing or rales.   No accessory muscle use  CV:  Regular  rhythm,  No edema  GI:  Soft, Non distended, Non tender. +Bowel sounds  Neurologic:  Alert and oriented X 3, normal speech,   Psych:   Good insight. Not anxious nor agitated  Skin:  No rashes. No jaundice, +dressing Rt foot    Reviewed most current lab test results and cultures  YES  Reviewed most current radiology test results   YES  Review and summation of old records today    NO  Reviewed patient's current orders and MAR    YES  PMH/SH reviewed - no change compared to H&P  ________________________________________________________________________  Care Plan discussed with:    Comments   Patient x    Family      RN x    Care Manager     Consultant                        Multidiciplinary team rounds were held today with , nursing, pharmacist and clinical coordinator. Patient's plan of care was discussed; medications were reviewed and discharge planning was addressed. ________________________________________________________________________  Total NON critical care TIME:  35   Minutes    Total CRITICAL CARE TIME Spent:   Minutes non procedure based      Comments   >50% of visit spent in counseling and coordination of care x    ________________________________________________________________________  Alcala Guile, DO     Procedures: see electronic medical records for all procedures/Xrays and details which were not copied into this note but were reviewed prior to creation of Plan. LABS:  I reviewed today's most current labs and imaging studies.   Pertinent labs include:  Recent Labs     10/17/21  0045 10/16/21  0555 10/15/21  1817   WBC 3.6* 2.6* 5.7   HGB 9.4* 9.4* 10.9*   HCT 29.8* 28.9* 34.1*   PLT 68* 55* 99*     Recent Labs     10/17/21  0045 10/16/21  0555 10/15/21  1817   * 139 138   K 4.1 3.9 3.8    109* 108   CO2 24 25 23   * 135* 112*   BUN 19 20 21*   CREA 0.81 0.84 0.83   CA 8.9 8.8 9.4   ALB  --   --  3.4*   TBILI  --   --  0.7   ALT  --   --  59   INR  --   --  1.2*       Signed: Giacomo Lezama DO

## 2021-10-18 NOTE — PROGRESS NOTES
1900--bedside report received from [place], rn , pt resting in bed oriented x 4, has no complaints at this time call bell in reach assessment as noted    0700--bedside report given to jason bahena who is assuming care of pt

## 2021-10-19 ENCOUNTER — TELEPHONE (OUTPATIENT)
Dept: INFECTIOUS DISEASES | Age: 76
End: 2021-10-19

## 2021-10-19 LAB
GLUCOSE BLD STRIP.AUTO-MCNC: 111 MG/DL (ref 65–117)
GLUCOSE BLD STRIP.AUTO-MCNC: 134 MG/DL (ref 65–117)
GLUCOSE BLD STRIP.AUTO-MCNC: 165 MG/DL (ref 65–117)
GLUCOSE BLD STRIP.AUTO-MCNC: 183 MG/DL (ref 65–117)
SERVICE CMNT-IMP: ABNORMAL
SERVICE CMNT-IMP: NORMAL

## 2021-10-19 PROCEDURE — 65270000029 HC RM PRIVATE

## 2021-10-19 PROCEDURE — 74011250637 HC RX REV CODE- 250/637: Performed by: INTERNAL MEDICINE

## 2021-10-19 PROCEDURE — 82962 GLUCOSE BLOOD TEST: CPT

## 2021-10-19 PROCEDURE — 74011636637 HC RX REV CODE- 636/637: Performed by: INTERNAL MEDICINE

## 2021-10-19 PROCEDURE — 74011250636 HC RX REV CODE- 250/636: Performed by: INTERNAL MEDICINE

## 2021-10-19 PROCEDURE — 74011000258 HC RX REV CODE- 258: Performed by: INTERNAL MEDICINE

## 2021-10-19 RX ORDER — METOPROLOL TARTRATE 25 MG/1
25 TABLET, FILM COATED ORAL EVERY 12 HOURS
Status: DISCONTINUED | OUTPATIENT
Start: 2021-10-19 | End: 2021-10-20 | Stop reason: HOSPADM

## 2021-10-19 RX ADMIN — Medication 10 ML: at 05:49

## 2021-10-19 RX ADMIN — CEFEPIME HYDROCHLORIDE 2 G: 2 INJECTION, POWDER, FOR SOLUTION INTRAVENOUS at 14:04

## 2021-10-19 RX ADMIN — LOSARTAN POTASSIUM 100 MG: 100 TABLET, FILM COATED ORAL at 08:28

## 2021-10-19 RX ADMIN — METRONIDAZOLE 500 MG: 500 INJECTION, SOLUTION INTRAVENOUS at 07:00

## 2021-10-19 RX ADMIN — Medication 10 ML: at 21:53

## 2021-10-19 RX ADMIN — METRONIDAZOLE 500 MG: 500 INJECTION, SOLUTION INTRAVENOUS at 21:54

## 2021-10-19 RX ADMIN — INSULIN LISPRO 3 UNITS: 100 INJECTION, SOLUTION INTRAVENOUS; SUBCUTANEOUS at 12:01

## 2021-10-19 RX ADMIN — AMLODIPINE BESYLATE 5 MG: 5 TABLET ORAL at 08:28

## 2021-10-19 RX ADMIN — CEFEPIME HYDROCHLORIDE 2 G: 2 INJECTION, POWDER, FOR SOLUTION INTRAVENOUS at 05:48

## 2021-10-19 RX ADMIN — METRONIDAZOLE 500 MG: 500 INJECTION, SOLUTION INTRAVENOUS at 14:12

## 2021-10-19 RX ADMIN — CEFEPIME HYDROCHLORIDE 2 G: 2 INJECTION, POWDER, FOR SOLUTION INTRAVENOUS at 21:50

## 2021-10-19 RX ADMIN — Medication 10 ML: at 14:00

## 2021-10-19 RX ADMIN — ROSUVASTATIN CALCIUM 10 MG: 10 TABLET, COATED ORAL at 21:51

## 2021-10-19 RX ADMIN — METOPROLOL TARTRATE 25 MG: 25 TABLET, FILM COATED ORAL at 21:51

## 2021-10-19 RX ADMIN — METOPROLOL TARTRATE 25 MG: 25 TABLET, FILM COATED ORAL at 12:04

## 2021-10-19 NOTE — PROGRESS NOTES
End of Shift Note    Bedside shift change report given to RACHELE Haddad (oncoming nurse) by Tashia Solis (offgoing nurse). Report included the following information SBAR, Kardex, Procedure Summary, Intake/Output and Recent Results    Shift worked:  7a-7p     Shift summary and any significant changes:     Patient up with post-op shoe, wound care by podiatry this shift. Patient to D/C home in am.     Concerns for physician to address:       Zone phone for oncoming shift:          Activity:  Activity Level: Up ad xu  Number times ambulated in hallways past shift: 0    Number of times OOB to chair past shift: 2    Cardiac:   Cardiac Monitoring: No      Cardiac Rhythm: Sinus Rhythm    Access:   Current line(s): PIV and PICC     Genitourinary:   Urinary status: voiding    Respiratory:   O2 Device: None (Room air)  Chronic home O2 use?: NO  Incentive spirometer at bedside: YES     GI:  Last Bowel Movement Date: 10/19/21  Current diet:  ADULT DIET Regular; 4 carb choices (60 gm/meal)  Passing flatus: YES  Tolerating current diet: YES       Pain Management:   Patient states pain is manageable on current regimen: YES    Skin:  Lux Score: 21  Interventions: increase time out of bed    Patient Safety:  Fall Score:  Total Score: 1  Interventions: assistive device (walker, cane, etc)       Length of Stay:  Expected LOS: [place] Mayer  Actual LOS: [address]

## 2021-10-19 NOTE — PROGRESS NOTES
Verbal shift change report given to Leigh Ann Orellana RN (oncoming nurse) by Bryant Evans RN (offgoing nurse). Report included the following information SBAR, Kardex, Intake/Output and MAR. Verbal shift change report given to Carlos Gutierrez RN (oncoming nurse) by Leigh Ann Orellana RN (offgoing nurse). Report included the following information SBAR, Kardex, Intake/Output and MAR.

## 2021-10-19 NOTE — INTERDISCIPLINARY ROUNDS
Interdisciplinary Rounds were completed on 10/19/21 for this patient. Rounds included nursing, clinical care leader, pharmacy, and case management. Plan of care discussed. See clinical pathway and/or care plan for interventions and desired outcomes.

## 2021-10-19 NOTE — PROGRESS NOTES
Progress Note  Date:10/19/2021       Room:[address] [place] [address]  Patient Buddy Wilson     YOB: 1945     Age:76 y.o. Subjective    Subjective:  Symptoms:  Stable. No shortness of breath, malaise, cough, chest pain, headache, chest pressure or diarrhea. Diet:  Adequate intake. No nausea or vomiting. Pain:  He complains of pain that is mild. Review of Systems   Respiratory: Negative for cough and shortness of breath. Cardiovascular: Negative for chest pain. Gastrointestinal: Negative for diarrhea, nausea and vomiting. Objective         Vitals Last 24 Hours:  TEMPERATURE:  Temp  Av.2 °F (36.8 °C)  Min: 97.7 °F (36.5 °C)  Max: 98.7 °F (37.1 °C)  RESPIRATIONS RANGE: Resp  Av.8  Min: 17  Max: 18  PULSE OXIMETRY RANGE: SpO2  Av.2 %  Min: 96 %  Max: 99 %  PULSE RANGE: Pulse  Av.8  Min: 70  Max: 97  BLOOD PRESSURE RANGE: Systolic (13ZKM), NWL:333 , Min:141 , JWI:734   ; Diastolic (38CWB), VALERIA:72, Min:55, Max:79    I/O (24Hr): Intake/Output Summary (Last 24 hours) at 10/19/2021 1448  Last data filed at 10/19/2021 0735  Gross per 24 hour   Intake 900 ml   Output 2100 ml   Net -1200 ml       Dressings removed. Incision and sutures intact, light bleeding on bandage. Skin is pink and viable. DP and PT 2/4; CFT less than 3 seconds  Sensation absent to fine touch at the level of the toes. Right hallux absent  Objective  Labs/Imaging/Diagnostics    Labs:  CBC:  Recent Labs     10/17/21  004   WBC 3.6*   RBC 3.52*   HGB 9.4*   HCT 29.8*   MCV 84.7   RDW 14.2   PLT 68*     CHEMISTRIES:  Recent Labs     10/17/21  0045   *   K 4.1      CO2 24   BUN 19   CA 8.9   PT/INR:No results for input(s): INR, INREXT in the last 72 hours. No lab exists for component: PROTIME  APTT:No results for input(s): APTT in the last 72 hours. LIVER PROFILE:No results for input(s): AST, ALT in the last 72 hours.     No lab exists for component: BILIDIR, SHRUTHI [facility]  Lab Results   Component Value Date/Time    ALT (SGPT) 59 10/15/2021 06:17 PM    AST (SGOT) 74 (H) 10/15/2021 06:17 PM    Alk. phosphatase 126 (H) 10/15/2021 06:17 PM    Bilirubin, total 0.7 10/15/2021 06:17 PM       Imaging Last 24 Hours:  No results found. Assessment//Plan   Active Problems:    Osteomyelitis ([address] (10/15/2021)      Assessment:  (Dressing changed performed today with no signs of infection    Once Path reports are clear pt can be DCd from podiatry standpoint if margins are clear. Will continue PWB right foot with surgical shoe. Keep dresisng clean dry and intact with elevation. F/U in clinic in 1-2 weeks post op  No need for anymore dressing changes in house. ).        Electronically signed by Giovani Almonte DPM on 10/19/2021 at 2:48 PM

## 2021-10-19 NOTE — PROGRESS NOTES
If no addtl needs arise, patient ready to discharge in the AM (10/20) from CM standpoint. RN notified. Transition of Care Plan:     RUR:  15% \"low risk\"  Disposition:  Home with [facility] ([facility]) & follow-up appts  Follow up appointments:  PCP, Podiatry- details in AVS  DME needed: IV abx provided by [facility] of [address] [place] [address] Road at Discharge: [address] or means to access home:  Spouse will provide  IM Medicare Letter: Reviewed & signed  Is patient a BCPI-A Bundle: No              If yes, was Bundle Letter given?: N/A     Caregiver Contact:Wife- Tamiko Garner, [phone]  Discharge Caregiver contacted prior to discharge? Update 4:03 PM  [facility]. CM secured PCP & podiatry follow-up appts. Per podiatry DPM, \"No need for anymore dressing changes in house\" as pt will f/u in office. Pt reports no questions or concerns for discharge at this time. Wife to transport in the AM.   Medicare pt has received, reviewed, and signed 2nd IM letter informing them of their right to appeal the discharge. Signed copy has been placed on pt bedside chart. If no addtl needs arise pt ready to d/c from CM standpoint. Initial note-  CM reviewed pt's chart. CM acknowledged consult for IV abx needed at discharge. CM sent final ID recs to [address] via [employer]. Pt still has PICC line from prior discharge. CM sent [facility] order to [facility] (fax# [phone]). CM met w/ pt at bedside to report updates& discuss d/c plan; pt called his wife on speaker phone. Pt verbalized understanding & is agreeable to plan at this time. No bedside teaching needed prior to discharge. Will continue to follow. Care Management Interventions  PCP Verified by CM: Yes (Pt sees Dr. Esvin Rivera.)  Palliative Care Criteria Met (RRAT>21 & CHF Dx)?: No  Mode of Transport at Discharge:  Other (see comment) (Wife)  Hospital Transport Time of Discharge: 1100  Transition of Care Consult (CM Consult): [address]: No  Reason Outside [place]: Patient already serviced by other home care/hospice agency  Discharge Durable Medical Equipment: Yes (IV abx)  Physical Therapy Consult: No  Occupational Therapy Consult: No  Speech Therapy Consult: No  Support Systems: Spouse/Significant Other  Confirm Follow Up Transport: Self  The Plan for Transition of Care is Related to the Following Treatment Goals : Home with [facility]  (SN) & follow-up appts  The Patient and/or Patient Representative was Provided with a Choice of Provider and Agrees with the Discharge Plan?: Yes  Name of the Patient Representative Who was Provided with a Choice of Provider and Agrees with the Discharge Plan: Foster Contras  Freedom of Choice List was Provided with Basic Dialogue that Supports the Patient's Individualized Plan of Care/Goals, Treatment Preferences and Shares the Quality Data Associated with the Providers?: Yes  Discharge Location  Discharge Placement: Home with home health ([address] [place] () & f/u)    HINA Oscar  Care Management

## 2021-10-19 NOTE — PROGRESS NOTES
ID coverage  Recommendations for antimicrobials on discharge requested. Case discussed with Dr. Dorina Polk at length   This is a patient of Dr. Julia Grayson. Spouse of patient has been noted to be aggressive, s/p Code atlas in hospital few days ago. Spouse had sought out Dr. Julia Grayson & appeared at her home 3 weeks ago. Intra-Op wound cultures-10/16-NG, GS-no organisms  Pathology-chronic OM, clear margins    Antibiotic orders for discharge:   Cefepime 2 g IV every 8 hourly Flagyl 500 mg IV every 8 hourly end date 10/30. Pull PICC at end of therapy. Weekly CBC, CMP-fax reports to Dr. Julia Grayson at [phone], call with critical labs at  [phone]. Encourage fluids, daily probiotic/yogurt, no alcohol use while on Flagyl.

## 2021-10-19 NOTE — PROGRESS NOTES
End of Shift Note    Bedside shift change report given to [address] (oncoming nurse) by Alfonso Saenz (offgoing nurse). Report included the following information SBAR, Kardex, Intake/Output, MAR and Recent Results    Shift worked:  8916-5276     Shift summary and any significant changes:    Patient is doing well ambulating in room partial weight bearing on R foot. No c/o of any pain or discomfort. Blood sygars have been stable no coverage at HS. Concerns for physician to address:      Zone phone for oncoming shift:   [phone]       Activity:  Activity Level: Up ad xu  Number times ambulated in Room: 2  Number of times OOB to chair past shift: 1    Cardiac:   Cardiac Monitoring: No      Cardiac Rhythm: Sinus Rhythm    Access:   Current line(s): PIV and PICC     Genitourinary:   Urinary status: voiding    Respiratory:   O2 Device: None (Room air)  Chronic home O2 use?: N/A  Incentive spirometer at bedside: YES     GI:  Last Bowel Movement Date: 10/17/21  Current diet:  ADULT DIET Regular; 4 carb choices (60 gm/meal)  Passing flatus: YES  Tolerating current diet: YES       Pain Management:   Patient states pain is manageable on current regimen: YES    Skin:  Lux Score: 21  Interventions: float heels, increase time out of bed and nutritional support     Patient Safety:  Fall Score:  Total Score: 1  Interventions: bed/chair alarm, assistive device (walker, cane, etc) and gripper socks       Length of Stay:  Expected LOS: - - -  Actual LOS: [address] LPN

## 2021-10-20 VITALS
BODY MASS INDEX: 25.68 KG/M2 | DIASTOLIC BLOOD PRESSURE: 71 MMHG | SYSTOLIC BLOOD PRESSURE: 166 MMHG | RESPIRATION RATE: 18 BRPM | HEART RATE: 76 BPM | HEIGHT: 72 IN | WEIGHT: 189.6 LBS | TEMPERATURE: 98.6 F | OXYGEN SATURATION: 100 %

## 2021-10-20 LAB
BACTERIA SPEC CULT: NORMAL
GLUCOSE BLD STRIP.AUTO-MCNC: 123 MG/DL (ref 65–117)
GRAM STN SPEC: NORMAL
GRAM STN SPEC: NORMAL
SERVICE CMNT-IMP: ABNORMAL
SERVICE CMNT-IMP: NORMAL

## 2021-10-20 PROCEDURE — 82962 GLUCOSE BLOOD TEST: CPT

## 2021-10-20 PROCEDURE — 74011250636 HC RX REV CODE- 250/636: Performed by: INTERNAL MEDICINE

## 2021-10-20 PROCEDURE — 90686 IIV4 VACC NO PRSV 0.5 ML IM: CPT | Performed by: INTERNAL MEDICINE

## 2021-10-20 PROCEDURE — 90471 IMMUNIZATION ADMIN: CPT

## 2021-10-20 PROCEDURE — 74011250637 HC RX REV CODE- 250/637: Performed by: INTERNAL MEDICINE

## 2021-10-20 PROCEDURE — 74011000258 HC RX REV CODE- 258: Performed by: INTERNAL MEDICINE

## 2021-10-20 RX ORDER — METRONIDAZOLE 500 MG/100ML
500 INJECTION, SOLUTION INTRAVENOUS EVERY 8 HOURS
Qty: 3000 ML | Refills: 0 | Status: SHIPPED | OUTPATIENT
Start: 2021-10-20 | End: 2021-10-30

## 2021-10-20 RX ORDER — AMLODIPINE BESYLATE 5 MG/1
5 TABLET ORAL DAILY
Qty: 30 TABLET | Refills: 0 | Status: SHIPPED | OUTPATIENT
Start: 2021-10-20 | End: 2021-11-19

## 2021-10-20 RX ORDER — METOPROLOL TARTRATE 25 MG/1
25 TABLET, FILM COATED ORAL EVERY 12 HOURS
Qty: 60 TABLET | Refills: 0 | Status: SHIPPED | OUTPATIENT
Start: 2021-10-20 | End: 2021-11-19

## 2021-10-20 RX ADMIN — METRONIDAZOLE 500 MG: 500 INJECTION, SOLUTION INTRAVENOUS at 05:51

## 2021-10-20 RX ADMIN — CEFEPIME HYDROCHLORIDE 2 G: 2 INJECTION, POWDER, FOR SOLUTION INTRAVENOUS at 05:49

## 2021-10-20 RX ADMIN — INFLUENZA VIRUS VACCINE 0.5 ML: 15; 15; 15; 15 SUSPENSION INTRAMUSCULAR at 09:54

## 2021-10-20 RX ADMIN — LOSARTAN POTASSIUM 100 MG: 100 TABLET, FILM COATED ORAL at 09:20

## 2021-10-20 RX ADMIN — Medication 10 ML: at 06:00

## 2021-10-20 RX ADMIN — AMLODIPINE BESYLATE 5 MG: 5 TABLET ORAL at 09:20

## 2021-10-20 RX ADMIN — METOPROLOL TARTRATE 25 MG: 25 TABLET, FILM COATED ORAL at 09:20

## 2021-10-20 NOTE — PROGRESS NOTES
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 On cefepime and metronidazole PTA based on prior cultures, following with ID outpatient. 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 Answered their questions       DM II  -A1c 6.5 , 2021, no need to repeat again  -A1c at goal  -BS somewhat elevated in low 200s, not quite at in hospital goal. May be d/t stress of surgery/infection and 2/2 counterregulatory hormones  -cont SSI, consider adding basal insulin pending BS trend  -Hold Metformin     Severe aortic Stenosis severe with moderate AI POA  s/p TAVR 3/24/21 w/ Dr. Ana Nolasco and Dr. Zora Garcia.     Uncontrolled HTN  -sbp up to 200 this am!  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 Patient resting in bed. Foot with new dressings applied    discussed with RN events overnight. Review of Systems:  Symptom Y/N Comments  Symptom Y/N Comments   Fever/Chills n   Chest Pain n    Poor Appetite n   Edema     Cough n   Abdominal Pain n    Sputum n   Joint Pain     SOB/MANCIA n   Pruritis/Rash     Nausea/vomit n   Tolerating PT/OT     Diarrhea n   Tolerating Diet     Constipation n   Other       Could NOT obtain due to:      Objective:     VITALS:   Last 24hrs VS reviewed since prior progress note. Most recent are:  Patient Vitals for the past 24 hrs:   Temp Pulse Resp BP SpO2   10/19/21 2032 99.2 °F (37.3 °C) 81 18 (!) 142/68 100 %   10/19/21 1534 98.9 °F (37.2 °C) 75 18 (!) 138/57 96 %   10/19/21 1145 97.7 °F (36.5 °C) 90 18 (!) 170/76 99 %   10/19/21 0816 98 °F (36.7 °C) 97 17 (!) 174/79 96 %   10/19/21 0413 98.7 °F (37.1 °C) 70 18 (!) 141/70 96 %   10/18/21 2330 98 °F (36.7 °C) 76 18 (!) 146/66 97 %       Intake/Output Summary (Last 24 hours) at 10/19/2021 2236  Last data filed at 10/19/2021 9100  Gross per 24 hour   Intake 660 ml   Output 800 ml   Net -140 ml        I had a face to face encounter and independently examined this patient on 10/19/2021, as outlined below:  PHYSICAL EXAM:  General: WD, WN. Alert, cooperative, no acute distress    EENT:  EOMI. Anicteric sclerae. MMM  Resp:  CTA bilaterally, no wheezing or rales. No accessory muscle use  CV:  Regular  rhythm,  No edema  GI:  Soft, Non distended, Non tender. +Bowel sounds  Neurologic:  Alert and oriented X 3, normal speech,   Psych:   Good insight. Not anxious nor agitated  Skin:  No rashes.   No jaundice, +dressing Rt foot    Reviewed most current lab test results and cultures  YES  Reviewed most current radiology test results   YES  Review and summation of old records today    NO  Reviewed patient's current orders and MAR    YES  PMH/SH reviewed - no change compared to H&P  ________________________________________________________________________  Care Plan discussed with:    Comments   Patient x    Family      RN x    Care Manager     Consultant                        Multidiciplinary team rounds were held today with , nursing, pharmacist and clinical coordinator. Patient's plan of care was discussed; medications were reviewed and discharge planning was addressed. ________________________________________________________________________  Total NON critical care TIME:  35   Minutes    Total CRITICAL CARE TIME Spent:   Minutes non procedure based      Comments   >50% of visit spent in counseling and coordination of care x    ________________________________________________________________________  Mayra Menjivar DO     Procedures: see electronic medical records for all procedures/Xrays and details which were not copied into this note but were reviewed prior to creation of Plan. LABS:  I reviewed today's most current labs and imaging studies.   Pertinent labs include:  Recent Labs     10/17/21  0045   WBC 3.6*   HGB 9.4*   HCT 29.8*   PLT 68*     Recent Labs     10/17/21  0045   *   K 4.1      CO2 24   *   BUN 19   CREA 0.81   CA 8.9       Signed: Mayra Menjivar DO

## 2021-10-20 NOTE — PROGRESS NOTES
Dated for 10/13/2021    Mr. Sang Aleman was seen in clinic today in follow up with me for foot infection. On 10/3/21, patient's wife had appeared on this provider's house, to express her concern for her 's foot. Patient had appeared at house very hysterical and panicked that the foot was \"red and swollen\" and that I should \"facetime\" to see the foot. Please see my telephone note from 10/3/21 in regards to that situation. Because of the unpredictability of that situation, I have been concerned for my safety and the safety of the staff. Hence, in anticipation of the patient's wife likely accompanying her  today, the [address] [place] [address] clinic staff today took steps towards security. The [facility]  was called in and the entire situation of 10/3/21 was explained to the police. In the clinic today, the patient's wife appeared calm and offered remorse saying \"I understand what I did on 10/3/21 was inappropriate, and I would never do it again\". When I asked her how she found my address, she said it was through the [address] [place] [address].. In the presence of the , we have issues Trespassing restriction to the patient's family. Myself and the [address] [place] [address] staff have strictly told the wife that what she did on 10/3/21 was gravely breeching lines of appropriateness and safety for the provider. We explained to her that this behavior, attitude and actions will NOT be tolerated the next time. We also told her that her behavior was of grave concern and endangered the safety of the provider's household and it does not give her any right to do the actions she did on 10/3/21. We also told her that if she has concerns for the patient's health, she either wait for the on-call provider to call back, or can go to the ER. During my entire clinical time with the patient in the clinic today, the patient's wife kept asking questions non-stop.  She deliberately tries to re-frame the same question in different ways to get an answer despite us having explained to her the entire situation from medical standpoint, and also when we clearly explain to her the uncertainties we have, again from medical standpoint with the patient's current clinical status. The patient himself remains calm, but is irritated and angry by his wife's questions, and was constantly trying to ask her to stop asking the same questions again and again. The wife's anxiousness was hampering my explanation to the patient himself.        Garcia Bunch MD  Infectious Diseases

## 2021-10-20 NOTE — ADT AUTH CERT NOTES
Foot: Surgical Wound Care - Care Day 5 (10/19/2021) by Татьяна Cantrell       Review Status Review Entered   Completed 10/20/2021 15:52      Criteria Review      Care Day: 5 Care Date: 10/19/2021 Level of Care: Inpatient Floor    Guideline Day 3    Clinical Status    (X) * Hemodynamic stability    10/20/2021 15:52:54 EDT by Татьяна Cantrell      VSS    (X) * Adequate supported ambulation    10/20/2021 15:52:55 EDT by Татьяна Cantrell      activity as tolerated with assist    (X) * Wound intact    10/20/2021 15:52:55 EDT by Татьяна Cantrell      Incision and sutures intact, light bleeding on bandage    (X) * Mental status at baseline    10/20/2021 15:52:55 EDT by Татьяна Cantrell      LOC alert    (X) * Afebrile or temperature acceptable for next level of care    10/20/2021 15:52:55 EDT by Татьяна Cantrell      Temp 99.2    (X) * Cultures negative or infection identified and under adequate treatment    10/20/2021 15:52:55 EDT by Татьяна Cantrell      10/16-NG, GS-no organisms    (X) * Metabolic derangement (eg, dehydration, acidosis) absent    10/20/2021 15:52:55 EDT by Татьяна Cantrell      none noted    (X) * New end organ dysfunction (eg, myocardial ischemia, renal failure) absent    10/20/2021 15:52:55 EDT by Татьяна Cantrell      none noted    (X) * No evidence of postoperative or surgical site infection    10/20/2021 15:52:55 EDT by Taylor wells. Incision and sutures intact, light bleeding on bandage. Skin is pink and viable. (X) * Pain absent or managed    10/20/2021 15:52:55 EDT by Татьяна Cantrell      Pain 0/10    (X) * Wound care needs acceptable for next level of care    10/20/2021 15:52:55 EDT by Cecelia Jordan Will continue PWB right foot with surgical shoe.  Keep dresisng clean dry and intact with elevation    ( ) * Discharge plans and education understood    Activity    (X) * Ambulatory or acceptable for next level of care    10/20/2021 15:52:55 EDT by Nalini Negrete      activity as tolerated with assist    Routes    (X) * Oral hydration    10/20/2021 15:52:55 EDT by Leandro Appiah adult regular diet    ( ) * Oral medications or regimen acceptable for next level of care    10/20/2021 15:52:55 EDT by Dee Carmona and IV meds    (X) * Oral diet or acceptable for next level of care    10/20/2021 15:52:55 EDT by Nalini Negrete      adult regular diet    Medications    ( ) * Antimicrobial treatment not necessary or treatment at next level of care arranged    10/20/2021 15:52:55 EDT by Nalini Negrete      Flagyl 500mg IV q8  Cefepime 2g IV q8    (X) * If diabetic, outpatient diabetic medication regimen established    10/20/2021 15:52:55 EDT by Nalini Negrete      Insulin lispro SC QID ACHS SSI    (X) Possible subcutaneous insulin    10/20/2021 15:52:55 EDT by Nalini Negrete      Insulin lispro SC QID ACHS SSI    * Milestone   Additional Notes   Date of care: 10/19/2021      IP- LOC-Surgical      ID PN: ID coverage   Recommendations for antimicrobials on discharge requested. Case discussed with Dr. Sophie Mcleod at length    This is a patient of Dr. Esther Patterson. Spouse of patient has been noted to be aggressive, s/p Code atlas in hospital few days ago. Spouse had sought out Dr. Esther Patterson & appeared at her home 3 weeks ago.       Intra-Op wound cultures-10/16-NG, GS-no organisms   Pathology-chronic OM, clear margins       Antibiotic orders for discharge:    Cefepime 2 g IV every 8 hourly Flagyl 500 mg IV every 8 hourly end date 10/30. Pull PICC at end of therapy. Weekly CBC, CMP-fax reports to Dr. Esther Patterson at [phone], call with critical labs at   [phone]. Encourage fluids, daily probiotic/yogurt, no alcohol use while on Flagyl.       Podiatry PN: Subjective    Subjective:   Symptoms:  Stable.  No shortness of breath, malaise, cough, chest pain, headache, chest pressure or diarrhea.     Diet:  Adequate intake.  No nausea or vomiting.     Pain:  He complains of pain that is mild.     Dressings removed. Incision and sutures intact, light bleeding on bandage. Skin is pink and viable. DP and PT 2/4; CFT less than 3 seconds   Sensation absent to fine touch at the level of the toes. Right hallux absent   Assessment//Plan   Active Problems:     Osteomyelitis ([address] (10/15/2021)   Assessment:   (Dressing changed performed today with no signs of infection   Once Path reports are clear pt can be DCd from podiatry standpoint if margins are clear. Will continue PWB right foot with surgical shoe. Keep dresisng clean dry and intact with elevation.    F/U in clinic in 1-2 weeks post op   No need for anymore dressing changes in house      Vitals: Temp 99.2, HR 97, /79, RR 18, 96% on RA      Labs:         Medications:    Amlodipine 5mg PO daily   Cefepime 2g IV q8   Insulin lispro SC QID ACHS SSI   Losartan 100mg PO daily   Metoprolol 25mg PO q12   Flagyl 500mg IV q8   Rosuvastatin 10mg PO every bedtime      Plan: wound care 3 times Mon, Wed, Fri, POC glucose QID ACHS, SCDs, adult regular diet, activity as tolerated with assist, continuous oximetry               Foot: Surgical Wound Care - Care Day 4 (10/18/2021) by Tierra Grace       Review Status Review Entered   Completed 10/19/2021 12:51      Criteria Review      Care Day: 4 Care Date: 10/18/2021 Level of Care: Inpatient Floor    Guideline Day 2    Level Of Care    (X) Floor    10/19/2021 12:45:41 EDT by Tierra Grace      IP FLOOR    Clinical Status    (X) * Procedure completed    10/19/2021 12:47:21 EDT by Kyle Cunha S/p Rt great to amputation at metatarsal joint POD 1 (surgery 10/16)    (X) * Hypotension absent    10/19/2021 12:47:21 EDT by Tierra Grace      HR 76-81, //86    (X) * Mental status at baseline    10/19/2021 12:47:21 EDT by Russell Frost and oriented X 3, normal speech    (X) * Afebrile or fever improved 10/19/2021 12:47:21 EDT by Fredo Tillman 98.6F    ( ) * Acidosis absent    ( ) * Blood glucose under acceptable control    10/19/2021 12:47:21 EDT by Tod Cargo -  (H), 183 (H)    (X) * Dehydration absent    ( ) * Electrolyte abnormalities absent or improved    Activity    ( ) * Assisted ambulation    10/19/2021 12:47:28 EDT by Leticia Peña      activity as tolerated w/ assist    Routes    (X) * Advance diet    10/19/2021 12:47:21 EDT by Leticia Peña      adult diet- carb control    (X) IV or oral medications    10/19/2021 12:51:06 EDT by Leticia Peña      norvasc 5 mg po qd, losartan 100 mg po qd, lopressor 25 mg po bid, crestor 10 mg po qd    Interventions    (X) Serum glucose, electrolytes    10/19/2021 12:47:43 EDT by Tod Cargo -  (H), 183 (H)    (X) Bedside glucose monitoring    10/19/2021 12:47:43 EDT by Tod Cargo -  (H), 183 (H)    (X) Wound management    (X) Physical therapy    Medications    (X) * PCA absent    (X) Possible antibiotics    10/19/2021 12:50:33 EDT by Leticia Peña      flagyl 500 mg iv q8hr, cefepime 2g iv q8hr    (X) Possible subcutaneous insulin    10/19/2021 12:51:06 EDT by Aleks Ogden sq q6hr prn    * Milestone   Additional Notes   10/18/21   IP SURGICAL      ATTENDING NOTE   Subjective:   Chief Complaint / Reason for Physician Visit   Patient denies new complaints. Denies foot pain, denies fevers/chillls/n/v       PHYSICAL EXAM:   General:          WD, WN. Alert, cooperative, no acute distress     EENT:              EOMI. Anicteric sclerae.  MMM   Resp:               CTA bilaterally, no wheezing or rales.  No accessory muscle use   CV:                  Regular  rhythm,  No edema   GI:                   Soft, Non distended, Non tender.  +Bowel sounds   Neurologic:       Alert and oriented X 3, normal speech,    Psych:   Good insight. Not anxious nor agitated   Skin:                No rashes.  No jaundice, +dressing Rt foot      Assessment / Plan:   Right great toe OM, diabetic foot infection POA   Possible septic tenosynovitis within flexor hallucis longus   S/p Rt great to amputation at metatarsal joint POD 1 (surgery 10/16)   -recent admission September 2021 for right first great toe OM, was sent home with IV antibiotics, MRI foot showed worsening of infection   -s/p vancomycin and zosyn, no growth on cultures so far. On cefepime and metronidazole PTA based on prior cultures, following with ID outpatient. 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 May be d/t stress of surgery/infection and 2/2 counterregulatory hormones   -cont SSI, consider adding basal insulin pending BS trend   -Hold Metformin       Severe aortic Stenosis severe with moderate AI POA   s/p TAVR 3/24/21 w/ Dr. Dory Carbajal and Dr. Burnard Dakin.       Uncontrolled HTN   -sbp up to 200 this am!   -given IV hydralazine with improvement of SBP to 160s   -SBP has been in 140s-150s since his TAVR ob   -patient reports good pain control   -resume home losartan, monitor VS       Hyperlipidemia POA   Atrial fibrillation s/p Ablation in Feb 2021   Essential HTN POA   -Currently NSR   Continue losartan,   Continue Statin    Code Status: Full code   Surrogate Decision Maker wife   DVT Prophylaxis: SCD   GI Prophylaxis: not indicated         PODIATRY   Subjective: Patient has 1 year Hx of non-healing wound right hallux treated by another doctor.  At his previous admission I debrided the wound and bone and he was discharged with [facility] and IV ABX directed by  Attar.    A repeat MRI was ordered recently due to lack of improvement, and the patient was asked to return for admission when significant adverse changes were seen on the images. Impression:   1. Osteomyelitis right hallux   2. New onset of right hallux infectious tenosynovitis   3. POD #2, Amputation right hallux   Recommendation:   Dressings changed. Awaiting Path and C&S. If the bone margins are clear, then he will need 10-14 days of ABX from the date of Sx but can otherwise be discharged home. Dr. Lila Scott was seeing the patient for management of IV ABX and could be consulted again.       NO LABS/IMAGING 10/18      VS:T 98.6F, HR 76-81, //86, RR 18, 97% RA   adult diet- carb control, activity as tolerated w/ assist, poc glucose, wound care   pt/ot

## 2021-10-20 NOTE — DISCHARGE INSTRUCTIONS
HOSPITALIST DISCHARGE INSTRUCTIONS    NAME: Pernell Carpio   :  1945   MRN:  [id number]     Date/Time:  10/20/2021 11:07 AM    ADMIT DATE: 10/15/2021   DISCHARGE DATE: 10/20/2021     Attending Physician: Annie Brand MD    DISCHARGE DIAGNOSIS:  Right great toe OM, diabetic foot infection POA  Possible septic tenosynovitis within flexor hallucis longus  Diabetes mellitus type 2  Severe aortic Stenosis severe with moderate AI POA  Uncontrolled hypertension  Hyperlipidemia POA  Atrial fibrillation s/p Ablation in 2021  Essential HTN POA    MEDICATIONS:  See above    · It is important that you take the medication exactly as they are prescribed. · Keep your medication in the bottles provided by the pharmacist and keep a list of the medication names, dosages, and times to be taken in your wallet. · Do not take other medications without consulting your doctor. Pain Management: per above medications    What to do at Home    Recommended diet:  Diabetic Diet    Recommended activity: Activity as tolerated    If you have questions regarding the hospital related prescriptions or hospital related issues please call [facility] at . You can always direct your questions to your primary care doctor if you are unable to reach your hospital physician; your PCP works as an extension of your hospital doctor just like your hospital doctor is an extension of your PCP for your time at [address] If you experience any of the following symptoms then please call your primary care physician or return to the emergency room if you cannot get hold of your doctor:  Fever, chills, nausea, vomiting, diarrhea, change in mentation, falling, bleeding, shortness of breath    Additional Instructions:  Cefepime 2 g IV every 8 hourly Flagyl 500 mg IV every 8 hourly end date 10/30. Pull PICC at end of therapy.   Weekly CBC, CMP-fax reports to Dr. Diana Langley at [phone], call with critical labs at  [phone]. Encourage fluids, daily probiotic/yogurt, no alcohol use while on Flagyl. Bring these papers with you to your follow up appointments. The papers will help your doctors be sure to continue the care plan from the hospital.              Information obtained by :  I understand that if any problems occur once I am at home I am to contact my physician. I understand and acknowledge receipt of the instructions indicated above.                                                                                                                                            Physician's or R.N.'s Signature                                                                  Date/Time                                                                                                                                              Patient or Representative Signature                                                          Da

## 2021-10-20 NOTE — PROGRESS NOTES
Transition of Care Plan:     RUR:  15% \"low risk\"  Disposition:  Home with [facility]  (SN) & follow-up appts- sent update to [facility] via [employer]. [place] [employer] Delivered IV meds to bedside 10/19/21  Per podiatry DPM, \"No need for anymore dressing changes in house\" as pt will f/u in office. Follow up appointments:  PCP: Genna Stafford: 10/25/21 at 4 PM   PodiatryPrashanth Echeverria: 11/2/21 at 3 PM  DME needed: [facility] abx provided by [facility] of [address] [place] [address] [facility] or means to access home:  Spouse will provide  IM Medicare Letter: Reviewed & signed 10/19/21. Medicare pt has received, reviewed, and signed 2nd IM letter informing them of their right to appeal the discharge. Signed copy has been placed on pt bedside chart. Is patient a BCPI-A Bundle: No              If yes, was Bundle Letter given?: N/A     Caregiver Contact:Wife- Elidia LUDWIG, [phone]  Discharge Caregiver contacted prior to discharge? Reviewed plan with Pt and wife yesterday. Wife has a MD appt and can come after. Pt was going to call son and see if he could come sooner. Pt was going to let RN. No further CM needs. Care Management Interventions  PCP Verified by CM: Yes (Pt sees Dr. Karen Capone.)  Palliative Care Criteria Met (RRAT>21 & CHF Dx)?: No  Mode of Transport at Discharge:  Other (see comment) (Wife)  Hospital Transport Time of Discharge: 1100  Transition of Care Consult (CM Consult): [address]: No  Reason Outside [place]: Patient already serviced by other home care/hospice agency  Discharge Durable Medical Equipment: Yes (IV abx)  Physical Therapy Consult: No  Occupational Therapy Consult: No  Speech Therapy Consult: No  Support Systems: Spouse/Significant Other  Confirm Follow Up Transport: Self  The Plan for Transition of Care is Related to the Following Treatment Goals : Home with [facility]  (SN) & follow-up appts  The Patient and/or Patient Representative was Provided with a Choice of Provider and Agrees with the Discharge Plan?: Yes  Name of the Patient Representative Who was Provided with a Choice of Provider and Agrees with the Discharge Plan: Willian Cornejo  Freedom of Choice List was Provided with Basic Dialogue that Supports the Patient's Individualized Plan of Care/Goals, Treatment Preferences and Shares the Quality Data Associated with the Providers?: Yes  Discharge Location  Discharge Placement: Home with home health ([address] [facility] () & f/u)    Eduardo Perea, [address]  Ext [phone]

## 2021-10-20 NOTE — PROGRESS NOTES
Pharmacist Discharge Medication Reconciliation    Significant PMH:   Past Medical History:   Diagnosis Date    Aortic regurgitation     Aortic stenosis     Atrial fibrillation (HCC)     Colon polyps     Diabetes mellitus, type 2 (HCC)     GI bleed     HTN (hypertension)      Encounter Diagnoses:   Encounter Diagnoses   Name Primary? Osteomyelitis of great toe of right foot (HCC) Yes    Cellulitis of foot      Allergies: Patient has no known allergies. Discharge Medications:   Current Discharge Medication List        START taking these medications    Details   amLODIPine (NORVASC) 5 mg tablet Take 1 Tablet by mouth daily for 30 days. Qty: 30 Tablet, Refills: 0  Start date: 10/20/2021, End date: 11/19/2021      metoprolol tartrate (LOPRESSOR) 25 mg tablet Take 1 Tablet by mouth every twelve (12) hours for 30 days. Qty: 60 Tablet, Refills: 0  Start date: 10/20/2021, End date: 11/19/2021      metroNIDAZOLE (FLAGYL) 100 mL by IntraVENous route every eight (8) hours for 10 days. Qty: 3000 mL, Refills: 0  Start date: 10/20/2021, End date: 10/30/2021      cefepime 2 gram 2 g IVPB 2 g by IntraVENous route every eight (8) hours for 10 days. Qty: 30 Dose, Refills: 0  Start date: 10/20/2021, End date: 10/30/2021           CONTINUE these medications which have NOT CHANGED    Details   losartan (COZAAR) 100 mg tablet Take 100 mg by mouth daily. aspirin delayed-release 81 mg tablet Take 81 mg by mouth daily. Qty:        pantoprazole (PROTONIX) 40 mg tablet Take 40 mg by mouth daily. rosuvastatin (CRESTOR) 10 mg tablet Take 1 Tab by mouth nightly. Indications: hardening of the arteries due to plaque buildup  Qty: 90 Tab, Refills: 4      metFORMIN (GLUCOPHAGE) 500 mg tablet Take 500 mg by mouth daily (with breakfast). multivitamin (ONE A DAY) tablet Take 1 Tab by mouth daily. The patient's chart, MAR and AVS were reviewed by Quan Villavicencio [facility].     Discharging Provider: Clari Camara MD    Thank you,     Meche Walker, [facility]

## 2021-10-20 NOTE — TELEPHONE ENCOUNTER
Dated for clinic visit for 10/13/2021     Mr. Roni Eaton was seen in clinic today in follow up with me for foot infection.      On 10/3/21, patient's wife had appeared on this provider's house, to express her concern for her 's foot. Patient had appeared at house very hysterical and panicked that the foot was \"red and swollen\" and that I should \"facetime\" to see the foot. Please see my telephone note from 10/3/21 in regards to that situation.      Because of the unpredictability of that situation, I have been concerned for my safety and the safety of the staff. Hence, in anticipation of the patient's wife likely accompanying her  today, the [address] [place] [address] clinic staff today took steps towards security. The [facility]  was called in and the entire situation of 10/3/21 was explained to the police.      In the clinic today, the patient's wife appeared calm and offered remorse saying \"I understand what I did on 10/3/21 was inappropriate, and I would never do it again\". When I asked her how she found my address, she said it was through the [address] [place] [address].. In the presence of the , we have issues Trespassing restriction to the patient's family. Myself and the [address] [place] [address] staff have strictly told the wife that what she did on 10/3/21 was gravely breeching lines of appropriateness and safety for the provider. We explained to her that this behavior, attitude and actions will NOT be tolerated the next time. We also told her that her behavior was of grave concern and endangered the safety of the provider's household and it does not give her any right to do the actions she did on 10/3/21. We also told her that if she has concerns for the patient's health, she either wait for the on-call provider to call back, or can go to the ER.      During my entire clinical time with the patient in the clinic today, the patient's wife kept asking questions non-stop.  She deliberately tries to re-frame the same question in different ways to get an answer despite us having explained to her the entire situation from medical standpoint, and also when we clearly explain to her the uncertainties we have, again from medical standpoint with the patient's current clinical status. The patient himself remains calm, but is irritated and angry by his wife's questions, and was constantly trying to ask her to stop asking the same questions again and again.  The wife's anxiousness was hampering my explanation to the patient himself.   Lovely Cordoba MD  Infectious Diseases

## 2021-10-20 NOTE — PROGRESS NOTES
End of Shift Note    Bedside shift change report given to [facility] RN (oncoming nurse) by Jessy Maldonado RN (offgoing nurse).   Report included the following information SBAR, Kardex, Intake/Output, MAR and Recent Results    Shift worked:  [id number]     Shift summary and any significant changes:    No significant changes during shift   Concerns for physician to address: None   Zone phone for oncoming shift:  [phone]

## 2021-10-20 NOTE — DISCHARGE SUMMARY
Hospitalist Discharge Summary     Patient ID:  Aaron Cabello  [id number]  89 y.o.  1945  10/15/2021    PCP on record: Jackson Sullivan MD    Admit date: 10/15/2021  Discharge date and time: 10/20/2021    DISCHARGE DIAGNOSIS:    Right great toe OM, diabetic foot infection POA  Possible septic tenosynovitis within flexor hallucis longus  Diabetes mellitus type 2  Severe aortic Stenosis severe with moderate AI POA  Uncontrolled hypertension  Hyperlipidemia POA  Atrial fibrillation s/p Ablation in Feb 2021  Essential HTN POA    CONSULTATIONS:  None    Excerpted HPI from H&P of Arabella London MD:  Aaron Cabello is a 78 y.o.  male with a past medical history of right toe diabetic foot infection with osteomyelitis, who is currently on cefepime and metronidazole IV, also has history of severe aortic stenosis s/p TAVR, hypertension, history of A. fib, hypertension, alcohol use, he was discharged from the hospital in September 2021 for osteomyelitis. He was seen by podiatry, he had MRI done which showed worsening of osteomyelitis, was sent here for right great toe amputation. He denies any complaints. Wife at the bedside    ______________________________________________________________________  DISCHARGE SUMMARY/HOSPITAL COURSE:  for full details see H&P, daily progress notes, labs, consult notes. 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 On cefepime and metronidazole PTA based on prior cultures, following with ID outpatient.  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 -pain currently well controlled     DM II  -A1c 6.5 , sept 2021, no need to repeat again  -A1c at goal  -BS somewhat elevated in low 200s, not quite at in hospital goal. May be d/t stress of surgery/infection and 2/2 counterregulatory hormones  -cont SSI, consider adding basal insulin pending BS trend  -Hold Metformin     Severe aortic Stenosis severe with moderate AI POA  s/p TAVR 3/24/21 w/ Dr. Bernardo Velasquez and Dr. Ludwig Fothergill.     Uncontrolled HTN  -sbp up to 200 this am!  -given IV hydralazine with improvement of SBP to 160s  -SBP has been in 140s-150s since his TAVR ob  -patient reports good pain control  -resume home losartan, monitor VS     Hyperlipidemia POA  Atrial fibrillation s/p Ablation in Feb 2021  Essential HTN POA  -Currently NSR  Continue losartan,  Continue Statin          _______________________________________________________________________  Patient seen and examined by me on discharge day. Pertinent Findings:  Gen:    Not in distress  Chest: Clear lungs  CVS:   Regular rhythm. No edema  Abd:  Soft, not distended, not tender  Neuro:  Alert,   _______________________________________________________________________  DISCHARGE MEDICATIONS:   Current Discharge Medication List      START taking these medications    Details   amLODIPine (NORVASC) 5 mg tablet Take 1 Tablet by mouth daily for 30 days. Qty: 30 Tablet, Refills: 0  Start date: 10/20/2021, End date: 11/19/2021      metoprolol tartrate (LOPRESSOR) 25 mg tablet Take 1 Tablet by mouth every twelve (12) hours for 30 days. Qty: 60 Tablet, Refills: 0  Start date: 10/20/2021, End date: 11/19/2021      metroNIDAZOLE (FLAGYL) 100 mL by IntraVENous route every eight (8) hours for 10 days.   Qty: 3000 mL, Refills: 0  Start date: 10/20/2021, End date: 10/30/2021      cefepime 2 gram 2 g IVPB 2 g by IntraVENous route every eight (8) hours for 10 days. Qty: 30 Dose, Refills: 0  Start date: 10/20/2021, End date: 10/30/2021         CONTINUE these medications which have NOT CHANGED    Details   losartan (COZAAR) 100 mg tablet Take 100 mg by mouth daily. aspirin delayed-release 81 mg tablet Take 81 mg by mouth daily. Qty:        pantoprazole (PROTONIX) 40 mg tablet Take 40 mg by mouth daily. rosuvastatin (CRESTOR) 10 mg tablet Take 1 Tab by mouth nightly. Indications: hardening of the arteries due to plaque buildup  Qty: 90 Tab, Refills: 4      metFORMIN (GLUCOPHAGE) 500 mg tablet Take 500 mg by mouth daily (with breakfast). multivitamin (ONE A DAY) tablet Take 1 Tab by mouth daily. Patient Follow Up Instructions: Activity: Activity as tolerated  Diet: Diabetic Diet  Wound Care: As directed    Cefepime 2 g IV every 8 hourly Flagyl 500 mg IV every 8 hourly end date 10/30. Pull PICC at end of therapy. Weekly CBC, CMP-fax reports to Dr. Lila Scott at [phone], call with critical labs at  [phone]. Encourage fluids, daily probiotic/yogurt, no alcohol use while on Flagyl. If you have questions regarding the hospital related prescriptions or hospital related issues please call [phone] [facility] at . You can always direct your questions to your primary care doctor if you are unable to reach your hospital physician; your PCP works as an extension of your hospital doctor just like your hospital doctor is an extension of your PCP for your time at [facility]. If you experience any of the following symptoms then please call your primary care physician or return to the emergency room if you cannot get hold of your doctor:  Fever, chills, nausea, vomiting, diarrhea, change in mentation, falling, bleeding, shortness of breath    Follow-up Information     Follow up With Specialties Details Why [address]    This is your home health provider. Start of care is 10/21/2021. Please contact them directly with any questions! [address]   This is your infusion provider. Contact them directly with questions! [address] [facility],   [facility] [address]  [phone]    Krystian Grissom DPM  Go on 11/2/2021 at 3:00 PM for podiatry follow-up appointment. [address] [place] [address],[address] [facility]  [phone]    Ramila Dobbins MD Family Medicine Go on 10/25/2021 at 4:00 PM for PCP hospital follow-up.  [address] [address]  [phone]          ________________________________________________________________    Risk of deterioration: Low    Condition at Discharge:  Stable  __________________________________________________________________    Disposition  Home with family and home health services    ____________________________________________________________________    Code Status: Full Code  ___________________________________________________________________      Total time in minutes spent coordinating this discharge (includes going over instructions, follow-up, prescriptions, and preparing report for sign off to her PCP) :  >30 minutes    Signed:  Abraham Ibarra MD

## 2021-10-25 LAB
BACTERIA SPEC CULT: NORMAL
SERVICE CMNT-IMP: NORMAL

## 2021-10-26 ENCOUNTER — TELEPHONE (OUTPATIENT)
Dept: INFECTIOUS DISEASES | Age: 76
End: 2021-10-26

## 2021-10-26 NOTE — TELEPHONE ENCOUNTER
----- Message from Elian Smith MD sent at 10/26/2021 11:18 AM EDT -----  Please cancel his appointment for 10/27. Doesn't need it anymore (was in the hospital recently and problem addressed). Msg back please.        Elian Smith MD  Infectious Diseases

## 2021-10-28 ENCOUNTER — TELEPHONE (OUTPATIENT)
Dept: FAMILY MEDICINE CLINIC | Age: 76
End: 2021-10-28

## 2021-10-28 NOTE — TELEPHONE ENCOUNTER
Spoke w/ Maryann Ramirez at [facility]; two patient identifiers confirmed. Informed that no labs have been received for this patient for this week; lab results needed prior to determination of end of therapy. Maryann Ramirez verbalized understanding and states that last labs they have are from 10/9/2021. Spoke w/ Toby Pastor who states that they will call [place] and find out where most recent lab results are and get them faxed to our office for review.     Aurelia Hill LPN

## 2021-10-28 NOTE — TELEPHONE ENCOUNTER
[place]     Wants to know if [facility] can be pulled       Best number to reach [place] at [phone]

## 2021-10-28 NOTE — TELEPHONE ENCOUNTER
Labs dated 10/21/2021 received via fax and given to provider (Dr. Jeanne Serra) for review.     Bart Chino LPN

## 2021-10-28 NOTE — TELEPHONE ENCOUNTER
Per Dr. Lelia Olivia progress note dates 10/19/2021. . .     Antibiotic orders for discharge:   Cefepime 2 g IV every 8 hourly Flagyl 500 mg IV every 8 hourly end date 10/30. Pull PICC at end of therapy. Weekly CBC, CMP-fax reports to Dr. Greyson Hebert at [phone], call with critical labs at  [phone]. Encourage fluids, daily probiotic/yogurt, no alcohol use while on Flagyl.     Sari Quijano LPN

## 2021-10-28 NOTE — TELEPHONE ENCOUNTER
Spoke w/ Burgess Sahni at [facility]; two patient identifiers confirmed. Informed per Dr. Billie Rivero can stop as planned for 10/30/21. Bill verbalized understanding.     Sari Quijano LPN

## 2021-11-05 NOTE — ADT AUTH CERT NOTES
We have not received an update for this case. Please contact me at your earliest convenience with total number of approved days. Thank you so much!      Thank you,   Jaquelin Brooks ([facility])    P: [phone]  ([address] [place] [address])  F: [phone]    YVN#[id number]    LAST DAY APPROVED 10/18

## 2021-11-10 LAB
ACID FAST STN SPEC: NEGATIVE
MYCOBACTERIUM SPEC QL CULT: NEGATIVE
SPECIMEN PREPARATION: NORMAL
SPECIMEN SOURCE: NORMAL

## 2022-02-09 DIAGNOSIS — M25.562 LEFT KNEE PAIN, UNSPECIFIED CHRONICITY: Primary | ICD-10-CM

## 2022-02-09 DIAGNOSIS — M25.561 RIGHT KNEE PAIN, UNSPECIFIED CHRONICITY: ICD-10-CM

## 2022-02-14 ENCOUNTER — HOSPITAL ENCOUNTER (OUTPATIENT)
Dept: GENERAL RADIOLOGY | Age: 77
Discharge: HOME | End: 2022-02-14
Payer: COMMERCIAL

## 2022-02-14 ENCOUNTER — OFFICE VISIT (OUTPATIENT)
Dept: ORTHOPEDIC SURGERY | Age: 77
End: 2022-02-14
Payer: COMMERCIAL

## 2022-02-14 VITALS
BODY MASS INDEX: 25.73 KG/M2 | WEIGHT: 190 LBS | SYSTOLIC BLOOD PRESSURE: 169 MMHG | HEART RATE: 77 BPM | DIASTOLIC BLOOD PRESSURE: 79 MMHG | HEIGHT: 72 IN | OXYGEN SATURATION: 99 % | TEMPERATURE: 97.3 F

## 2022-02-14 DIAGNOSIS — M17.0 BILATERAL PRIMARY OSTEOARTHRITIS OF KNEE: Primary | ICD-10-CM

## 2022-02-14 DIAGNOSIS — M25.562 LEFT KNEE PAIN, UNSPECIFIED CHRONICITY: ICD-10-CM

## 2022-02-14 DIAGNOSIS — M25.561 RIGHT KNEE PAIN, UNSPECIFIED CHRONICITY: ICD-10-CM

## 2022-02-14 PROCEDURE — 73564 X-RAY EXAM KNEE 4 OR MORE: CPT

## 2022-02-14 PROCEDURE — 99203 OFFICE O/P NEW LOW 30 MIN: CPT | Performed by: ORTHOPAEDIC SURGERY

## 2022-02-14 RX ORDER — LISINOPRIL 40 MG/1
1 TABLET ORAL DAILY
COMMUNITY
End: 2022-04-20 | Stop reason: ALTCHOICE

## 2022-02-14 RX ORDER — METOPROLOL TARTRATE 25 MG/1
TABLET, FILM COATED ORAL
COMMUNITY
Start: 2022-02-10

## 2022-02-14 RX ORDER — ZINC GLUCONATE 50 MG
TABLET ORAL
COMMUNITY

## 2022-02-14 RX ORDER — AMLODIPINE BESYLATE 5 MG/1
TABLET ORAL
COMMUNITY
Start: 2021-10-20

## 2022-02-14 NOTE — LETTER
2/14/2022    Patient: Hi Damon   YOB: 1945   Date of Visit: 2/14/2022     Desi Ferraro MD  [address] [place] [address]  Via Fax: [phone]    Dear Desi Ferraro MD,      Thank you for referring Mr. Hi Damon to [facility] for evaluation. My notes for this consultation are attached. If you have questions, please do not hesitate to call me. I look forward to following your patient along with you.       Sincerely,    Yael Low, DO

## 2022-02-14 NOTE — PROGRESS NOTES
2/14/2022    Chief Complaint: Right knee pain    Assessment: Osteoarthritis right knee    Plan: This patient and I did discuss the many options in treating knee osteoarthritis. We did discuss that we could continue to seek out nonoperative modalities, such as: NSAIDs, oral and topical analgesics, knee injections, knee braces, physical therapy, stretching, strengthening, and weight loss strategies, activity modification, ambulatory assistive devices. The patient stated their understanding with this, but would like to proceed with surgical management in the form of a total knee arthroplasty. We did discuss the risks of surgery which include but are not limited to infection, nerve or blood vessel damage, failure of fixation, failure of any possible implant, need for reoperation, postoperative pain and swelling, intra-or postoperative fracture, postoperative dislocation, leg length inequality, need for reoperation, implant failure, death, disability, organ dysfunction, wound healing issues, DVT, PE, and the need for further procedures. The patient did freely state their understanding and satisfaction with our discussion. We will proceed after medical clearances. The patient was counseled at length about the risks of awa Covid-19 during their perioperative period and any recovery window from their procedure. The patient was made aware that awa Covid-19  may worsen their prognosis for recovering from their procedure and lend to a higher morbidity and/or mortality risk. All material risks, benefits, and reasonable alternatives including postponing the procedure were discussed. The patient does  wish to proceed with the procedure at this time. HPI: This is a 78 y.o. male who complains of right knee pain. Onset was gradual.  The patient has had activity dependent pain for years.     The patient has tried activity modification, physical therapy exercises, injections have failed to provide relief. The pain is in the knee, it is severe in intensity. The patient feels unstable with the knee, fears falling, and has significant limitation with activities of daily living, recreation, and walks with a limp. Past Medical History:   Diagnosis Date    Aortic regurgitation     Aortic stenosis     Atrial fibrillation (HCC)     Colon polyps     Diabetes mellitus, type 2 (HCC)     GI bleed     HTN (hypertension)        Past Surgical History:   Procedure Laterality Date    COLONOSCOPY N/A 3/16/2021    COLONOSCOPY performed by Dina Cushing., [facility] at [facility] ENDOSCOPY    HX AFIB ABLATION  02/2021    HX AMPUTATION TOE         Current Outpatient Medications on File Prior to Visit   Medication Sig Dispense Refill    metoprolol succinate 25 mg CSpX 1 capsule      multivitamin (MULTIPLE VITAMIN ESSENTIAL PO)       amLODIPine (NORVASC) 5 mg tablet 1 tablet      lisinopriL (PRINIVIL, ZESTRIL) 40 mg tablet Take 1 Tablet by mouth daily.  metoprolol tartrate (LOPRESSOR) 25 mg tablet       rivaroxaban (Xarelto) 20 mg tab tablet take 1 tablet by oral route  every day with the evening meal  - resume 1 week prior to watchman implant      zinc gluconate 50 mg tablet 1 tablet      losartan (COZAAR) 100 mg tablet Take 100 mg by mouth daily.  aspirin delayed-release 81 mg tablet Take 81 mg by mouth daily.  metFORMIN (GLUCOPHAGE) 500 mg tablet Take 500 mg by mouth daily (with breakfast).  multivitamin (ONE A DAY) tablet Take 1 Tab by mouth daily.  rosuvastatin (CRESTOR) 10 mg tablet Take 1 Tab by mouth nightly. Indications: hardening of the arteries due to plaque buildup 90 Tab 4    pantoprazole (PROTONIX) 40 mg tablet Take 40 mg by mouth daily. No current facility-administered medications on file prior to visit. No Known Allergies    No family history on file.     Social History     Socioeconomic History    Marital status:    Tobacco Use    Smoking status: Former Smoker     Types: Cigarettes     Quit date: 36     Years since quittin.1    Smokeless tobacco: Never Used   Substance and Sexual Activity    Drug use: Never    Sexual activity: Never     Social Determinants of Health     Physical Activity: Insufficiently Active    Days of Exercise per Week: 3 days    Minutes of Exercise per Session: 10 min         Review of Systems:       General: Denies headache, lethargy, fever, weight loss  Ears/Nose/Throat: Denies ear discharge, drainage, nosebleeds, hoarse voice, dental problems  Cardiovascular: Denies chest pain, shortness of breath  Lungs: Denies chest pain, breathing problems, wheezing, pneumonia  Stomach: Denies stomach pain, heartburn, constipation, irritable bowel  Skin: Denies rash, sores, open wounds  Musculoskeletal: Admits to knee pain  Genitourinary: Denies dysuria, hematuria, polyuria  Gastrointestinal: Denies constipation, obstipation, diarrhea  Neurological: Denies changes in sight, smell, hearing, taste, seizures. Denies loss of consciousness.   Psychiatric: Denies depression, sleep pattern changes, anxiety, change in personality  Endocrine: Denies mood swings, heat or cold intolerance  Hematologic/Lymphatic: Denies anemia, purpura, petechia  Allergic/Immunologic: Denies swelling of throat, pain or swelling at lymph nodes      Physical Examination:    Visit Vitals  BP (!) 169/79 (BP 1 Location: Right arm, BP Patient Position: Sitting, BP Cuff Size: Large adult)   Pulse 77   Temp 97.3 °F (36.3 °C) (Tympanic)   Ht 6' (1.829 m)   Wt 190 lb (86.2 kg)   SpO2 99%   BMI 25.77 kg/m²        General: AOX3, no apparent distress  Psychiatric: mood and affect appropriate  Lungs: breathing is symmetric and unlabored bilaterally  Heart: regular rate and rhythm  Abdomen: no guarding  Head: normocephalic, atraumatic  Skin: No significant abnormalities, good turgor  Sensation intact to light touch: L1-S1 dermatomes  Muscular exam: 5/5 strength in all major muscle groups unless noted in specialty exam.    Extremities:      Left upper extremity: Full active and passive range of motion without pain, deformity, no open wound, strength 5/5 in all major muscle groups. Right upper extremity: Full active and passive range of motion without pain, deformity, no open wound, strength 5/5 in all major muscle groups. Left lower extremity: No deformity is noted. Range of motion of the knee is 0-120. Ligamentous testing of the knee indicates stability of the the MCL, LCL, PCL, and ACL. Lachman's, anterior and posterior drawer tests are specifically negative. Medial joint line tenderness to palpation. Popliteal area is unremarkable. 1+  for effusion. + patellar crepitus. Patella tracks centrally + grind test.  Pivot shift is negative. Strength testing is indicative of 5/5 strength at hip flexion, extension, knee flexion and extension, tibialis anterior, EHL, and FHL. Sensation is intact to light touch in the L1-S1 dermatomes. Capillary refill is less than 2 seconds in the toes. Right lower extremity:  No deformity is noted. Range of motion of the knee is 0-120. Ligamentous testing of the knee indicates stability of the the MCL, LCL, PCL, and ACL. Lachman's, anterior and posterior drawer tests are specifically negative. Medial joint line tenderness to palpation. Popliteal area is unremarkable. 1+  for effusion. + patellar crepitus. Patella tracks centrally + grind test.  Pivot shift is negative. Strength testing is indicative of 5/5 strength at hip flexion, extension, knee flexion and extension, tibialis anterior, EHL, and FHL. Sensation is intact to light touch in the L1-S1 dermatomes. Capillary refill is less than 2 seconds in the toes. Diagnostics:    Pertinent Xrays:  Xrays are available of the bilateral knee. Bone on bone osteoarthritic changes of the right knee, osteophytes and subchondral sclerosis is noted.         Mr. Eder Siegel has a reminder for a \"due or due soon\" health maintenance. I have asked that he contact his primary care provider for follow-up on this health maintenance.

## 2022-02-14 NOTE — PROGRESS NOTES
Identified pt with two pt identifiers (name and ). Reviewed chart in preparation for visit and have obtained necessary documentation. Lewis Hendricks is a 78 y.o. male  Chief Complaint   Patient presents with    Knee Pain     Bilateral Knee     Visit Vitals  BP (!) 169/79 (BP 1 Location: Right arm, BP Patient Position: Sitting, BP Cuff Size: Large adult)   Pulse 77   Temp 97.3 °F (36.3 °C) (Tympanic)   Ht 6' (1.829 m)   Wt 190 lb (86.2 kg)   SpO2 99%   BMI 25.77 kg/m²     1. Have you been to the ER, urgent care clinic since your last visit? Hospitalized since your last visit? No    2. Have you seen or consulted any other health care providers outside of the [address] [place] [address] since your last visit? Include any pap smears or colon screening.  No

## 2022-02-15 ENCOUNTER — TELEPHONE (OUTPATIENT)
Dept: ORTHOPEDIC SURGERY | Age: 77
End: 2022-02-15

## 2022-02-15 NOTE — TELEPHONE ENCOUNTER
Called pt's cell to schedule surgery and there was no answer and vm was full. Called his house and his wife answered and stated he was diabetic and took a while to recover from his toe amputation. She wasn't sure about this surgery, but she would remind the pt and have him return my phone call if he decides on surgery.

## 2022-02-16 ENCOUNTER — TELEPHONE (OUTPATIENT)
Dept: ORTHOPEDIC SURGERY | Age: 77
End: 2022-02-16

## 2022-02-16 NOTE — TELEPHONE ENCOUNTER
PT called stating he missed the phone call from Camille Christie yesterday to schedule his surgery however he would like a call back to ask a few questions related to surgery and if gel injections are an alternative option to try first.

## 2022-02-18 NOTE — TELEPHONE ENCOUNTER
Per benefits investigation, [place] does not cover visco, but prescription insurance may cover through specialty pharmacy after PA. Submitted notes to portal to initiate authorization.

## 2022-02-22 NOTE — TELEPHONE ENCOUNTER
Rcvd approval from [facility]. Contacted [facility] to confirm that they had rcvd RX. Per rep they have rcvd and are awaiting the patient to return their call to authorize shipping to our office. I contacted the patient to advise that auth was rcvd. I requested for him to call [facility] to authorize shipment and provided phone number.  He will call to authorize today, and I will contact the patient when the delivery has been scheduled by [facility].

## 2022-02-22 NOTE — TELEPHONE ENCOUNTER
Patient contacted the office to advise that he has spoken with [facility] and injections should be in next Tuesday.

## 2022-02-28 ENCOUNTER — TELEPHONE (OUTPATIENT)
Dept: ORTHOPEDIC SURGERY | Age: 77
End: 2022-02-28

## 2022-02-28 NOTE — TELEPHONE ENCOUNTER
PT called stating his gel injections were sent to his home address instead of to the medical office and was calling to ask if he can just bring them with him on his appointment on the 3rd of March 2022. PSR was informed by  that the PT will need to call the supplier for the injections and have new injections shipped directly to the office per [facility] policy.

## 2022-03-02 ENCOUNTER — OFFICE VISIT (OUTPATIENT)
Dept: ORTHOPEDIC SURGERY | Age: 77
End: 2022-03-02
Payer: COMMERCIAL

## 2022-03-02 VITALS
HEART RATE: 66 BPM | OXYGEN SATURATION: 99 % | HEIGHT: 72 IN | SYSTOLIC BLOOD PRESSURE: 143 MMHG | BODY MASS INDEX: 26.01 KG/M2 | WEIGHT: 192 LBS | TEMPERATURE: 97.7 F | DIASTOLIC BLOOD PRESSURE: 76 MMHG

## 2022-03-02 DIAGNOSIS — M17.0 BILATERAL PRIMARY OSTEOARTHRITIS OF KNEE: Primary | ICD-10-CM

## 2022-03-02 PROCEDURE — 20610 DRAIN/INJ JOINT/BURSA W/O US: CPT | Performed by: ORTHOPAEDIC SURGERY

## 2022-03-02 NOTE — PROGRESS NOTES
Identified pt with two pt identifiers (name and ). Reviewed chart in preparation for visit and have obtained necessary documentation. Frank Roger is a 78 y.o. male  Chief Complaint   Patient presents with    Joint Or Tendon Injection     RT knee 1st Euflexxa     Visit Vitals  BP (!) 143/76 (BP 1 Location: Right arm, BP Patient Position: Sitting, BP Cuff Size: Large adult)   Pulse 66   Temp 97.7 °F (36.5 °C) (Tympanic)   Ht 6' (1.829 m)   Wt 192 lb (87.1 kg)   SpO2 99%   BMI 26.04 kg/m²     1. Have you been to the ER, urgent care clinic since your last visit? Hospitalized since your last visit? No    2. Have you seen or consulted any other health care providers outside of the [address] [place] [address] since your last visit? Include any pap smears or colon screening.  No

## 2022-03-02 NOTE — PROGRESS NOTES
Date of Procedure: 3/2/2022  PROCEDURE NOTE: Right knee injection of Euflexxa    Consent was obtained from the patient. The correct site was identified after confirmation with the patient. Following identification and confirmation of the correct site with the patient, the superolateral right knee was prepped in the normal sterile fashion. A local anesthetic of 1% lidocaine without epinephrine was then administered to the local tissues. Following, an injection of Euflexxa 20 mg viscosupplementation prefilled syringe was administered to the right knee. The needle was then withdrawn and the injection site dressed with a sterile bandage at the conclusion. The procedure was well tolerated by the patient. No immediate adverse reactions were noted. Post injection instructions were given.

## 2022-03-02 NOTE — LETTER
3/2/2022    Patient: Teodoro Mesa   YOB: 1945   Date of Visit: 3/2/2022     Eloisa Quintana MD  [address] [address]  Via Fax: [phone]    Dear Eloisa Quintana MD,      Thank you for referring Mr. Teodoro Mesa to [facility] for evaluation. My notes for this consultation are attached. If you have questions, please do not hesitate to call me. I look forward to following your patient along with you.       Sincerely,    Ana Lloyd, DO

## 2022-03-07 ENCOUNTER — TELEPHONE (OUTPATIENT)
Dept: ORTHOPEDIC SURGERY | Age: 77
End: 2022-03-07

## 2022-03-07 NOTE — TELEPHONE ENCOUNTER
PT's wife called to inquire whether a request had been sent into the PT's insurance to approve him getting euflexxa injections in his LT knee. PSR stated yes the requested had been sent on 3/2/22 and the speciality pharmacy should be reaching out to the PT soon.

## 2022-03-09 ENCOUNTER — OFFICE VISIT (OUTPATIENT)
Dept: ORTHOPEDIC SURGERY | Age: 77
End: 2022-03-09
Payer: COMMERCIAL

## 2022-03-09 VITALS
BODY MASS INDEX: 26.01 KG/M2 | TEMPERATURE: 97.4 F | DIASTOLIC BLOOD PRESSURE: 76 MMHG | HEIGHT: 72 IN | SYSTOLIC BLOOD PRESSURE: 145 MMHG | HEART RATE: 60 BPM | OXYGEN SATURATION: 100 % | WEIGHT: 192 LBS

## 2022-03-09 DIAGNOSIS — M17.0 BILATERAL PRIMARY OSTEOARTHRITIS OF KNEE: Primary | ICD-10-CM

## 2022-03-09 PROCEDURE — 20610 DRAIN/INJ JOINT/BURSA W/O US: CPT | Performed by: ORTHOPAEDIC SURGERY

## 2022-03-09 NOTE — PROGRESS NOTES
Identified pt with two pt identifiers (name and ). Reviewed chart in preparation for visit and have obtained necessary documentation. Lynne Dee is a 78 y.o. male  Chief Complaint   Patient presents with    Joint Or Tendon Injection     Bilateral knee Euflexxa     Visit Vitals  BP (!) 145/76 (BP 1 Location: Right arm, BP Patient Position: Sitting, BP Cuff Size: Large adult)   Pulse 60   Temp 97.4 °F (36.3 °C) (Tympanic)   Ht 6' (1.829 m)   Wt 192 lb (87.1 kg)   SpO2 100%   BMI 26.04 kg/m²     1. Have you been to the ER, urgent care clinic since your last visit? Hospitalized since your last visit? No    2. Have you seen or consulted any other health care providers outside of the [address] [place] [address] since your last visit? Include any pap smears or colon screening.  No

## 2022-03-09 NOTE — PROGRESS NOTES
Date of Procedure: 3/9/2022  PROCEDURE NOTE: left knee injection of Euflexxa    Consent was obtained from the patient. The correct site was identified after confirmation with the patient. Following identification and confirmation of the correct site with the patient, the superolateral left knee was prepped in the normal sterile fashion. A local anesthetic of 1% lidocaine without epinephrine was then administered to the local tissues. Following, an injection of Euflexxa 20 mg viscosupplementation prefilled syringe was administered to the right knee. The needle was then withdrawn and the injection site dressed with a sterile bandage at the conclusion. The procedure was well tolerated by the patient. No immediate adverse reactions were noted. Post injection instructions were given. Date of Procedure: 3/9/2022  PROCEDURE NOTE: Right knee injection of Euflexxa    Consent was obtained from the patient. The correct site was identified after confirmation with the patient. Following identification and confirmation of the correct site with the patient, the superolateral right knee was prepped in the normal sterile fashion. A local anesthetic of 1% lidocaine without epinephrine was then administered to the local tissues. Following, an injection of Euflexxa 20 mg viscosupplementation prefilled syringe was administered to the right knee. The needle was then withdrawn and the injection site dressed with a sterile bandage at the conclusion. The procedure was well tolerated by the patient. No immediate adverse reactions were noted. Post injection instructions were given.

## 2022-03-16 ENCOUNTER — OFFICE VISIT (OUTPATIENT)
Dept: ORTHOPEDIC SURGERY | Age: 77
End: 2022-03-16
Payer: COMMERCIAL

## 2022-03-16 VITALS
HEART RATE: 63 BPM | SYSTOLIC BLOOD PRESSURE: 159 MMHG | WEIGHT: 192 LBS | TEMPERATURE: 97 F | HEIGHT: 72 IN | BODY MASS INDEX: 26.01 KG/M2 | OXYGEN SATURATION: 99 % | DIASTOLIC BLOOD PRESSURE: 80 MMHG

## 2022-03-16 DIAGNOSIS — M17.0 BILATERAL PRIMARY OSTEOARTHRITIS OF KNEE: Primary | ICD-10-CM

## 2022-03-16 PROCEDURE — 20610 DRAIN/INJ JOINT/BURSA W/O US: CPT | Performed by: ORTHOPAEDIC SURGERY

## 2022-03-16 NOTE — PROGRESS NOTES
Date of Procedure: 3/16/2022  PROCEDURE NOTE: Left knee injection of Euflexxa      Consent was obtained from the patient. The correct site was identified after confirmation with the patient. Following identification and confirmation of the correct site with the patient, the superolateral left knee was prepped in the normal sterile fashion. A local anesthetic of 1% lidocaine without epinephrine was then administered to the local tissues. Following, an injection of Euflexxa 20 mg viscosupplementation prefilled syringe was administered to the left knee. The needle was then withdrawn and the injection site dressed with a sterile bandage at the conclusion. The procedure was well tolerated by the patient. No immediate adverse reactions were noted. Post injection instructions were given. Date of Procedure: 3/16/2022  PROCEDURE NOTE: Right knee injection of Euflexxa    Consent was obtained from the patient. The correct site was identified after confirmation with the patient. Following identification and confirmation of the correct site with the patient, the superolateral right knee was prepped in the normal sterile fashion. A local anesthetic of 1% lidocaine without epinephrine was then administered to the local tissues. Following, an injection of Euflexxa 20 mg viscosupplementation prefilled syringe was administered to the right knee. The needle was then withdrawn and the injection site dressed with a sterile bandage at the conclusion. The procedure was well tolerated by the patient. No immediate adverse reactions were noted. Post injection instructions were given.

## 2022-03-16 NOTE — PROGRESS NOTES
Identified pt with two pt identifiers (name and ). Reviewed chart in preparation for visit and have obtained necessary documentation. Kanika Johnson is a 78 y.o. male  Chief Complaint   Patient presents with    Joint Or Tendon Injection     Bilateral knee RT 3rd LT 2nd Euflexxa PT Supplied     Visit Vitals  BP (!) 159/80 (BP 1 Location: Right arm, BP Patient Position: Sitting, BP Cuff Size: Large adult)   Pulse 63   Temp 97 °F (36.1 °C) (Tympanic)   Ht 6' (1.829 m)   Wt 192 lb (87.1 kg)   SpO2 99%   BMI 26.04 kg/m²     1. Have you been to the ER, urgent care clinic since your last visit? Hospitalized since your last visit? No    2. Have you seen or consulted any other health care providers outside of the [address] [place] [address] since your last visit? Include any pap smears or colon screening.  No

## 2022-03-23 ENCOUNTER — OFFICE VISIT (OUTPATIENT)
Dept: ORTHOPEDIC SURGERY | Age: 77
End: 2022-03-23
Payer: COMMERCIAL

## 2022-03-23 VITALS
OXYGEN SATURATION: 99 % | SYSTOLIC BLOOD PRESSURE: 159 MMHG | HEIGHT: 72 IN | TEMPERATURE: 97.2 F | DIASTOLIC BLOOD PRESSURE: 82 MMHG | BODY MASS INDEX: 26.01 KG/M2 | WEIGHT: 192 LBS | HEART RATE: 64 BPM

## 2022-03-23 DIAGNOSIS — M17.0 BILATERAL PRIMARY OSTEOARTHRITIS OF KNEE: Primary | ICD-10-CM

## 2022-03-23 PROCEDURE — 20610 DRAIN/INJ JOINT/BURSA W/O US: CPT | Performed by: ORTHOPAEDIC SURGERY

## 2022-03-23 NOTE — PROGRESS NOTES
Identified pt with two pt identifiers (name and ). Reviewed chart in preparation for visit and have obtained necessary documentation. Glen Lewis is a 78 y.o. male  Chief Complaint   Patient presents with    Joint Or Tendon Injection     LT knee inj 3rd Euflexxa **Pt Supplied**     Visit Vitals  BP (!) 159/82 (BP 1 Location: Right arm, BP Patient Position: Sitting, BP Cuff Size: Large adult)   Pulse 64   Temp 97.2 °F (36.2 °C) (Tympanic)   Ht 6' (1.829 m)   Wt 192 lb (87.1 kg)   SpO2 99%   BMI 26.04 kg/m²     1. Have you been to the ER, urgent care clinic since your last visit? Hospitalized since your last visit? No    2. Have you seen or consulted any other health care providers outside of the [address] [place] [address] since your last visit? Include any pap smears or colon screening.  No

## 2022-03-23 NOTE — PROGRESS NOTES
Date of Procedure: 3/23/2022  PROCEDURE NOTE: Left knee injection of Euflexxa      Consent was obtained from the patient. The correct site was identified after confirmation with the patient. Following identification and confirmation of the correct site with the patient, the superolateral left knee was prepped in the normal sterile fashion. A local anesthetic of 1% lidocaine without epinephrine was then administered to the local tissues. Following, an injection of Euflexxa 20 mg viscosupplementation prefilled syringe was administered to the left knee. The needle was then withdrawn and the injection site dressed with a sterile bandage at the conclusion. The procedure was well tolerated by the patient. No immediate adverse reactions were noted. Post injection instructions were given.

## 2022-03-30 NOTE — PROGRESS NOTES
TRANSFER - OUT REPORT:    Verbal report given to Susie(name) on Cayetano Current  being transferred to (unit) for routine progression of care       Report consisted of patients Situation, Background, Assessment and   Recommendations(SBAR). Information from the following report(s) SBAR was reviewed with the receiving nurse. Lines:   Peripheral IV 03/14/21 Left Forearm (Active)   Site Assessment Clean, dry, & intact 03/16/21 0800   Phlebitis Assessment 0 03/16/21 0800   Infiltration Assessment 0 03/16/21 0800   Dressing Status Clean, dry, & intact 03/16/21 0800   Dressing Type Transparent 03/16/21 0800   Hub Color/Line Status Pink 03/16/21 0800   Action Taken Open ports on tubing capped 03/16/21 0800   Alcohol Cap Used Yes 03/16/21 0800       Peripheral IV 03/15/21 Right; Anterior; Upper Arm (Active)   Site Assessment Clean, dry, & intact 03/16/21 0800   Phlebitis Assessment 0 03/16/21 0800   Infiltration Assessment 0 03/16/21 0800   Dressing Status Clean, dry, & intact 03/16/21 0800   Dressing Type Transparent 03/16/21 0800   Hub Color/Line Status Green 03/16/21 0800   Action Taken Open ports on tubing capped 03/16/21 0800   Alcohol Cap Used Yes 03/16/21 0800        Opportunity for questions and clarification was provided. Universal Safety Interventions

## 2022-04-06 ENCOUNTER — VIRTUAL VISIT (OUTPATIENT)
Dept: ORTHOPEDIC SURGERY | Age: 77
End: 2022-04-06
Payer: COMMERCIAL

## 2022-04-06 DIAGNOSIS — M17.0 BILATERAL PRIMARY OSTEOARTHRITIS OF KNEE: Primary | ICD-10-CM

## 2022-04-06 PROCEDURE — 99441 PR PHYS/QHP TELEPHONE EVALUATION 5-10 MIN: CPT | Performed by: ORTHOPAEDIC SURGERY

## 2022-04-06 NOTE — PROGRESS NOTES
4/6/2022      CC: bilateral knee injections    HPI:      This is a 78y.o. year old male who presents for follow up of their bilateral bilateral knee injection. The patient states that they have had very good relief of their pain. The time since injection has been two weeks. PMH:  Past Medical History:   Diagnosis Date    Aortic regurgitation     Aortic stenosis     Atrial fibrillation (HCC)     Colon polyps     Diabetes mellitus, type 2 (HCC)     GI bleed     HTN (hypertension)        PSxHx:  Past Surgical History:   Procedure Laterality Date    COLONOSCOPY N/A 3/16/2021    COLONOSCOPY performed by Danitza Perry MD at [address] HX AFIB ABLATION  02/2021    HX AMPUTATION TOE         Meds:    Current Outpatient Medications:     metoprolol succinate 25 mg CSpX, 1 capsule, Disp: , Rfl:     multivitamin (MULTIPLE VITAMIN ESSENTIAL PO), , Disp: , Rfl:     amLODIPine (NORVASC) 5 mg tablet, 1 tablet, Disp: , Rfl:     lisinopriL (PRINIVIL, ZESTRIL) 40 mg tablet, Take 1 Tablet by mouth daily. , Disp: , Rfl:     metoprolol tartrate (LOPRESSOR) 25 mg tablet, , Disp: , Rfl:     rivaroxaban (Xarelto) 20 mg tab tablet, take 1 tablet by oral route  every day with the evening meal  - resume 1 week prior to watchman implant, Disp: , Rfl:     zinc gluconate 50 mg tablet, 1 tablet, Disp: , Rfl:     losartan (COZAAR) 100 mg tablet, Take 100 mg by mouth daily. , Disp: , Rfl:     aspirin delayed-release 81 mg tablet, Take 81 mg by mouth daily. , Disp:  , Rfl:     metFORMIN (GLUCOPHAGE) 500 mg tablet, Take 500 mg by mouth daily (with breakfast). , Disp: , Rfl:     multivitamin (ONE A DAY) tablet, Take 1 Tab by mouth daily. , Disp: , Rfl:     pantoprazole (PROTONIX) 40 mg tablet, Take 40 mg by mouth daily. , Disp: , Rfl:     rosuvastatin (CRESTOR) 10 mg tablet, Take 1 Tab by mouth nightly.  Indications: hardening of the arteries due to plaque buildup, Disp: 90 Tab, Rfl: 4    All:  No Known Allergies    Social Hx:  Social History     Socioeconomic History    Marital status:    Tobacco Use    Smoking status: Former Smoker     Types: Cigarettes     Quit date:      Years since quittin.2    Smokeless tobacco: Never Used   Substance and Sexual Activity    Drug use: Never    Sexual activity: Never     Social Determinants of Health     Physical Activity: Insufficiently Active    Days of Exercise per Week: 3 days    Minutes of Exercise per Session: 10 min       Family Hx:  No family history on file. Review of Systems:       General: Denies headache, lethargy, fever, weight loss  Ears/Nose/Throat: Denies ear discharge, drainage, nosebleeds, hoarse voice, dental problems  Cardiovascular: Denies chest pain, shortness of breath  Lungs: Denies chest pain, breathing problems, wheezing, pneumonia  Stomach: Denies stomach pain, heartburn, constipation, irritable bowel  Skin: Denies rash, sores, open wounds  Musculoskeletal: bilateral knee pain - resolved  Genitourinary: Denies dysuria, hematuria, polyuria  Gastrointestinal: Denies constipation, obstipation, diarrhea  Neurological: Denies changes in sight, smell, hearing, taste, seizures. Denies loss of consciousness. Psychiatric: Denies depression, sleep pattern changes, anxiety, change in personality  Endocrine: Denies mood swings, heat or cold intolerance  Hematologic/Lymphatic: Denies anemia, purpura, petechia  Allergic/Immunologic: Denies swelling of throat, pain or swelling at lymph nodes      Physical Examination:    There were no vitals taken for this visit. General: AOX3, no apparent distress  Psychiatric: mood and affect appropriate        Diagnostics:    Pertinent Diagnostics: none    Assessment: Status post bilateral knee injection  Plan: This patient and I discussed the normal course for injections, we discussed that pain relief will likely be temporary to some degree, but I cannot predict the longevity of relief.    We also discussed the limitations of injections, and that I cannot inject the same area any more often than every three months. We will proceed with continued observation, follow up prn. Patient was in [place] at the time of consultation. I was in the office while conducting this encounter. Consent:  He and/or his healthcare decision maker is aware that this patient-initiated Telehealth encounter is a billable service, with coverage as determined by his insurance carrier. He is aware that he may receive a bill and has provided verbal consent to proceed: Yes    This virtual visit was conducted telephone encounter only. -  I affirm this is a Patient Initiated Episode with an Established Patient who has not had a related appointment within my department in the past 7 days or scheduled within the next 24 hours. Note: this encounter is not billable if this call serves to triage the patient into an appointment for the relevant concern. Total Time: minutes: 5-10 minutes. Mr. Markell Otero has a reminder for a \"due or due soon\" health maintenance. I have asked that he contact his primary care provider for follow-up on this health maintenance.

## 2022-04-08 ENCOUNTER — TELEPHONE (OUTPATIENT)
Dept: CARDIOLOGY CLINIC | Age: 77
End: 2022-04-08

## 2022-04-08 DIAGNOSIS — Z95.2 S/P TAVR (TRANSCATHETER AORTIC VALVE REPLACEMENT): Primary | ICD-10-CM

## 2022-04-08 DIAGNOSIS — I35.0 NONRHEUMATIC AORTIC VALVE STENOSIS: ICD-10-CM

## 2022-04-20 ENCOUNTER — HOSPITAL ENCOUNTER (OUTPATIENT)
Dept: NON INVASIVE DIAGNOSTICS | Age: 77
Discharge: HOME | End: 2022-04-20
Attending: NURSE PRACTITIONER
Payer: COMMERCIAL

## 2022-04-20 ENCOUNTER — OFFICE VISIT (OUTPATIENT)
Dept: CARDIOLOGY CLINIC | Age: 77
End: 2022-04-20
Payer: COMMERCIAL

## 2022-04-20 VITALS
BODY MASS INDEX: 26.01 KG/M2 | DIASTOLIC BLOOD PRESSURE: 82 MMHG | HEIGHT: 72 IN | SYSTOLIC BLOOD PRESSURE: 159 MMHG | WEIGHT: 192.02 LBS

## 2022-04-20 VITALS
BODY MASS INDEX: 25.73 KG/M2 | RESPIRATION RATE: 18 BRPM | HEART RATE: 62 BPM | WEIGHT: 190 LBS | SYSTOLIC BLOOD PRESSURE: 145 MMHG | OXYGEN SATURATION: 99 % | HEIGHT: 72 IN | DIASTOLIC BLOOD PRESSURE: 81 MMHG | TEMPERATURE: 98 F

## 2022-04-20 DIAGNOSIS — I35.0 NONRHEUMATIC AORTIC VALVE STENOSIS: ICD-10-CM

## 2022-04-20 DIAGNOSIS — I35.0 SEVERE AORTIC STENOSIS: ICD-10-CM

## 2022-04-20 DIAGNOSIS — Z95.2 S/P TAVR (TRANSCATHETER AORTIC VALVE REPLACEMENT): Primary | ICD-10-CM

## 2022-04-20 DIAGNOSIS — Z95.2 S/P TAVR (TRANSCATHETER AORTIC VALVE REPLACEMENT): ICD-10-CM

## 2022-04-20 LAB
ECHO AO ROOT DIAM: 4 CM
ECHO AO ROOT INDEX: 1.91 CM/M2
ECHO AV AREA PEAK VELOCITY: 2.9 CM2
ECHO AV AREA PEAK VELOCITY: 3.9 CM2
ECHO AV AREA VTI: 3.3 CM2
ECHO AV AREA/BSA VTI: 1.6 CM2/M2
ECHO AV MEAN GRADIENT: 7 MMHG
ECHO AV MEAN VELOCITY: 1.3 M/S
ECHO AV PEAK GRADIENT: 13 MMHG
ECHO AV PEAK GRADIENT: 7 MMHG
ECHO AV PEAK VELOCITY: 1.3 M/S
ECHO AV PEAK VELOCITY: 1.8 M/S
ECHO AV VTI: 37.4 CM
ECHO EST RA PRESSURE: 3 MMHG
ECHO LV E' LATERAL VELOCITY: 11 CM/S
ECHO LV E' SEPTAL VELOCITY: 7 CM/S
ECHO LV FRACTIONAL SHORTENING: 29 % (ref 28–44)
ECHO LV INTERNAL DIMENSION DIASTOLE INDEX: 2.68 CM/M2
ECHO LV INTERNAL DIMENSION DIASTOLIC: 5.6 CM (ref 4.2–5.9)
ECHO LV INTERNAL DIMENSION SYSTOLIC INDEX: 1.91 CM/M2
ECHO LV INTERNAL DIMENSION SYSTOLIC: 4 CM
ECHO LV IVSD: 1 CM (ref 0.6–1)
ECHO LV MASS 2D: 219.7 G (ref 88–224)
ECHO LV MASS INDEX 2D: 105.1 G/M2 (ref 49–115)
ECHO LV POSTERIOR WALL DIASTOLIC: 1 CM (ref 0.6–1)
ECHO LV RELATIVE WALL THICKNESS RATIO: 0.36
ECHO LVOT AREA: 4.9 CM2
ECHO LVOT AV VTI INDEX: 0.7
ECHO LVOT DIAM: 2.5 CM
ECHO LVOT MEAN GRADIENT: 2 MMHG
ECHO LVOT PEAK GRADIENT: 5 MMHG
ECHO LVOT PEAK VELOCITY: 1.1 M/S
ECHO LVOT STROKE VOLUME INDEX: 61.3 ML/M2
ECHO LVOT SV: 128.1 ML
ECHO LVOT VTI: 26.1 CM
ECHO MV A VELOCITY: 0.85 M/S
ECHO MV E DECELERATION TIME (DT): 265.7 MS
ECHO MV E VELOCITY: 0.93 M/S
ECHO MV E/A RATIO: 1.09
ECHO MV E/E' LATERAL: 8.45
ECHO MV E/E' RATIO (AVERAGED): 10.87
ECHO MV E/E' SEPTAL: 13.29
ECHO RIGHT VENTRICULAR SYSTOLIC PRESSURE (RVSP): 28 MMHG
ECHO RV TAPSE: 2.3 CM (ref 1.7–?)
ECHO TV REGURGITANT MAX VELOCITY: 2.5 M/S
ECHO TV REGURGITANT PEAK GRADIENT: 25 MMHG

## 2022-04-20 PROCEDURE — 93306 TTE W/DOPPLER COMPLETE: CPT

## 2022-04-20 PROCEDURE — 99214 OFFICE O/P EST MOD 30 MIN: CPT | Performed by: NURSE PRACTITIONER

## 2022-04-20 NOTE — LETTER
4/20/2022    Patient: Yanni Farrar   YOB: 1945   Date of Visit: 4/20/2022     Moustapha Gilbert MD  [address] [place] [address] [phone]  Via Fax: [phone]    Dear Moustapha Gilbert MD,      Thank you for referring Mr. Yanni Farrar to [address] [place] [address] [facility] [place] for evaluation. My notes for this consultation are attached. If you have questions, please do not hesitate to call me. I look forward to following your patient along with you.       Sincerely,    Hector Lea NP

## 2022-04-20 NOTE — PROGRESS NOTES
Patient: Vic Hickman   Age: 78 y.o. Patient Care Team:  Prakash Dorman MD as PCP - General (Family Medicine)  Dinh Mcwilliams MD (Cardiology)  Samy Downing MD (Cardiothoracic Surgery)    PCP: Prakash Dorman MD    Cardiologist: Dr. Cara Simon    Diagnosis/Reason for Consultation: The primary encounter diagnosis was S/P TAVR (transcatheter aortic valve replacement). A diagnosis of Severe aortic stenosis was also pertinent to this visit. Problem List:   Patient Active Problem List   Diagnosis Code    GIB (gastrointestinal bleeding) K92.2    Severe aortic stenosis I35.0    Aortic stenosis I35.0    S/P TAVR (transcatheter aortic valve replacement) Z95.2    Diabetic foot (HCC) E11.8    Osteomyelitis ([address] M86.9         HPI: 78 y.o. y.o. male  S/P transcatheter aortic valve replacement with 29 mm Giles 3 with 3 ml extra volume by Sebastian Munoz and Ja on 2/21/60. JENNIFER CQZMTICHS was uncomplicated he was discharged to [facility] 3/25/21. he  is here today for his  One [address] [place] [address] Appointment. He has been doing very well since his TAVR. Denies fever, chills, worsening shortness of breath or fatigue, chest pain, orthopnea, PND, dizziness, syncope or recent fall. Since his TAVR, he had a removal of his rectal mass and amputation of a great toe due to Osteomyelitis. He has continued to take his ASA. Last Dental Visit:  >1 year   Any dental pain/concerns: None    NYHA Classification: 0   Class I (Mild): No limitation of physical activity. Ordinary physical activity does not cause undue fatigue, palpitation, or dyspnea. Class II (Mild): Slight limitation of physical activity. Comfortable at rest, but ordinary physical activity results in fatigue, palpitation, or dyspnea. Class III (Moderate): Marked limitation of physical activity. Comfortable at rest, but less than ordinary activity causes fatigue, palpitation, or dyspnea   Class IV (Severe):  Unable to carry out any physical activity without discomfort. Symptoms  of cardiac insufficiency at rest.  If any physical activity is undertaken, discomfort is increased. Angina Classification: 0   Class 0: No symptoms   Class 1: Angina with strenuous exercise   Class 2: Angina with moderate exercise   Class 3: Angina with mild exertion   Walking 1-2 level blocks at normal pace; Climbing 1 flight of stairs at normal pace  Class 4: Angina at any level of physical exertion       Past Medical History:   Diagnosis Date    Aortic regurgitation     Aortic stenosis     Atrial fibrillation (HCC)     Colon polyps     Diabetes mellitus, type 2 (HCC)     GI bleed     HTN (hypertension)          Past Surgical History:   Procedure Laterality Date    COLONOSCOPY N/A 3/16/2021    [facility] Mercedes [facility] MD [facility] ENDOSCOPY    HX AFIB ABLATION  2021    HX AMPUTATION TOE        Social History     Tobacco Use    Smoking status: Former Smoker     Types: Cigarettes     Quit date:      Years since quittin.3    Smokeless tobacco: Never Used   Substance Use Topics    Alcohol use: Not on file      No family history on file. Prior to Admission medications    Medication Sig Start Date End Date Taking? Authorizing Provider   amLODIPine (NORVASC) 5 mg tablet 1 tablet 10/20/21  Yes Provider, Historical   metoprolol tartrate (LOPRESSOR) 25 mg tablet  2/10/22  Yes Provider, Historical   losartan (COZAAR) 100 mg tablet Take 100 mg by mouth daily. Yes Provider, Historical   aspirin delayed-release 81 mg tablet Take 81 mg by mouth daily. 3/26/21  Yes Len Adams NP   metFORMIN (GLUCOPHAGE) 500 mg tablet Take 500 mg by mouth daily (with breakfast). Yes Provider, Historical   multivitamin (ONE A DAY) tablet Take 1 Tab by mouth daily. Yes Provider, Historical   rosuvastatin (CRESTOR) 10 mg tablet Take 1 Tab by mouth nightly.  Indications: hardening of the arteries due to plaque buildup 3/9/21  Yes Keke Cabello MD   metoprolol succinate 25 mg CSpX 1 capsule  Patient not taking: Reported on 4/20/2022    Provider, Historical   multivitamin (MULTIPLE VITAMIN ESSENTIAL PO)     Provider, Historical   zinc gluconate 50 mg tablet 1 tablet  Patient not taking: Reported on 4/20/2022    Provider, Historical   lisinopriL (PRINIVIL, ZESTRIL) 40 mg tablet Take 1 Tablet by mouth daily. 4/20/22  Provider, Historical   rivaroxaban (Xarelto) 20 mg tab tablet take 1 tablet by oral route  every day with the evening meal  - resume 1 week prior to watchman implant 5/26/21 4/20/22  Provider, Historical   pantoprazole (PROTONIX) 40 mg tablet Take 40 mg by mouth daily. 4/20/22  Provider, Historical       No Known Allergies    Current Medications:   Current Outpatient Medications   Medication Sig Dispense Refill    amLODIPine (NORVASC) 5 mg tablet 1 tablet      metoprolol tartrate (LOPRESSOR) 25 mg tablet       losartan (COZAAR) 100 mg tablet Take 100 mg by mouth daily.  aspirin delayed-release 81 mg tablet Take 81 mg by mouth daily.  metFORMIN (GLUCOPHAGE) 500 mg tablet Take 500 mg by mouth daily (with breakfast).  multivitamin (ONE A DAY) tablet Take 1 Tab by mouth daily.  rosuvastatin (CRESTOR) 10 mg tablet Take 1 Tab by mouth nightly. Indications: hardening of the arteries due to plaque buildup 90 Tab 4    metoprolol succinate 25 mg CSpX 1 capsule (Patient not taking: Reported on 4/20/2022)      multivitamin (MULTIPLE VITAMIN ESSENTIAL PO)  (Patient not taking: Reported on 4/20/2022)      zinc gluconate 50 mg tablet 1 tablet (Patient not taking: Reported on 4/20/2022)         Vitals: Blood pressure (!) 145/81, pulse 62, temperature 98 °F (36.7 °C), temperature source Oral, resp. rate 18, height 6' (1.829 m), weight 190 lb (86.2 kg), SpO2 99 %. Allergies: has No Known Allergies.     Review of Systems: Pertinent Positives per HPI   [x]Total of 13 systems reviewed as follows:  Constitutional: Negative fever, negative chills  Eyes: Negative for amauroses fugax  ENT:   Negative sore throat,oral abscess   Endocrine Negative for thyroid replacement Rx; goiter; DM  Respiratory:  Negative chronic cough,sputum production  Cards:   Negative for palpitations, varicosities, claudication, lower extremity edema  GI:   Negative for dysphagia, bleeding, nausea, vomiting, diarrhea, and abdominal pain  Genitourinary: Negative for frequency, dysuria  Integument:  Negative for rash and pruritus  Hematologic:  Negative for easy bruising; bleeding dyscrasia   Musculoskel: Negative for muscle weakness inhibiting ambulation, +B knee pain  Neurological:  Negative for stroke, TIA, syncope, dizziness  Behavl/Psych: Negative for feelings of anxiety, depression     Cardiovascular Testing:     EKG: 10/15/21  Component Ref Range & Units 6 mo ago 1 yr ago   Ventricular Rate BPM 86  73    Atrial Rate BPM 86  73    P-R Interval ms 162  204    QRS Duration ms 80  88    Q-T Interval ms 396  434    QTC Calculation (Bezet) ms 473  478    Calculated P Axis degrees 52  55    Calculated R Axis degrees 32  50    Calculated T Axis degrees 56  81    Diagnosis  Normal sinus rhythm   Normal ECG   When compared with ECG of 25-MAR-2021 02:37,   No significant change was found   Confirmed by James Mosley ([id number]) on 10/16/2021 9:58:00 AM  Normal sinus rhythm   Voltage criteria for left ventricular hypertrophy     When compared with ECG of 24-MAR-2021 14:20,   No significant change was found   Confirmed by Sandi Vargas M.D., Fredi Newell ([id number]) on 3/25/2021 5:27:06 PM          TTE:  04/20/22 Completed-report pending    Physical Exam:  General: Well nourished well groomed male appearing stated age  Neuro: A&OX3. ACEVEDO. PERRL. Steady unassisted gait  Head:Normocephalic. Atraumatic. Symmetrical  Neck: Trachea Midline  Resp: CTA B. No Adv BS/cough/sputum/tachypnea with seated conversation  CV: S1S2 RRR. No appreciable murmur. No JVD/carotid bruits. Pink/warm/dry extremities.  No LE peripheral edema  GI:Benign ab. Soft. NT/ND. Active BS  : Voids  Integ: No obvious s/s of infection or breakdown  Musculo/Skeletal: FROM in all major joints. Good muscle tone    Clinic Evaluation:   [facility]Q-12: scanned into EMR      Assessment/Plan:     1. Aortic Stenosis severe and symptomatic with moderate AI: s/p TAVR 3/24/21 w/ Dr. Jerad Awad and Dr. Sole Taveras. ASA only. 1 year Echo completed today-report pending     2.  Rectal mass found on Colonoscopy: Surgery consulted -- paths all report tubular adenoma. Had this surgically removed and has his follow up in May 2022     3. Atrial fibrillation s/p Ablation in Feb 2021: Had been on Xarelto but was discontinued due to GIB. On BB     4. HTN:  Losartan      5. HLD: On Statin     6. DM II: On Metformin.      7. Osteomyelitis of great toe s/p amputation      SBE prophylax reviewed.        F/U with primary cardiologist

## 2022-04-21 NOTE — PROGRESS NOTES
Saw him for his 1 year with an echo. I don't think he currently has an appt with your clinic for follow up. Thanks!

## 2022-05-31 ENCOUNTER — TELEPHONE (OUTPATIENT)
Dept: ORTHOPEDIC SURGERY | Age: 77
End: 2022-05-31

## 2022-06-16 ENCOUNTER — OFFICE VISIT (OUTPATIENT)
Dept: ORTHOPEDIC SURGERY | Age: 77
End: 2022-06-16
Payer: COMMERCIAL

## 2022-06-16 VITALS
HEART RATE: 82 BPM | HEIGHT: 72 IN | DIASTOLIC BLOOD PRESSURE: 79 MMHG | WEIGHT: 194 LBS | SYSTOLIC BLOOD PRESSURE: 152 MMHG | BODY MASS INDEX: 26.28 KG/M2 | TEMPERATURE: 98.5 F | OXYGEN SATURATION: 98 %

## 2022-06-16 DIAGNOSIS — I50.22 CHRONIC SYSTOLIC (CONGESTIVE) HEART FAILURE (HCC): ICD-10-CM

## 2022-06-16 DIAGNOSIS — M17.0 BILATERAL PRIMARY OSTEOARTHRITIS OF KNEE: Primary | ICD-10-CM

## 2022-06-16 PROCEDURE — 99213 OFFICE O/P EST LOW 20 MIN: CPT | Performed by: ORTHOPAEDIC SURGERY

## 2022-06-16 PROCEDURE — 1123F ACP DISCUSS/DSCN MKR DOCD: CPT | Performed by: ORTHOPAEDIC SURGERY

## 2022-06-16 NOTE — LETTER
6/16/2022    Patient: Subhash Casillas   YOB: 1945   Date of Visit: 6/16/2022     Liz Castro MD  [address] [phone]  Via Fax: [phone]    Dear Liz Castro MD,      Thank you for referring Mr. Subhash Casillas to [facility] for evaluation. My notes for this consultation are attached. If you have questions, please do not hesitate to call me. I look forward to following your patient along with you.       Sincerely,    Tisha Walton, DO

## 2022-06-16 NOTE — PROGRESS NOTES
Identified pt with two pt identifiers (name and ). Reviewed chart in preparation for visit and have obtained necessary documentation. Breonna Aden is a 78 y.o. male  Chief Complaint   Patient presents with    Follow-up     Bilateral Knee     Visit Vitals  BP (!) 152/79 (BP 1 Location: Right arm, BP Patient Position: Sitting, BP Cuff Size: Large adult)   Pulse 82   Temp 98.5 °F (36.9 °C) (Tympanic)   Ht 6' (1.829 m)   Wt 194 lb (88 kg)   SpO2 98%   BMI 26.31 kg/m²     1. Have you been to the ER, urgent care clinic since your last visit? Hospitalized since your last visit? No    2. Have you seen or consulted any other health care providers outside of the [address] [place] [address] since your last visit? Include any pap smears or colon screening.  No

## 2022-06-17 NOTE — PROGRESS NOTES
6/16/2022    Chief Complaint: Right knee pain    Assessment: Osteoarthritis right knee    Plan: This patient and I did discuss the many options in treating knee osteoarthritis. We did discuss that we could continue to seek out nonoperative modalities, such as: NSAIDs, oral and topical analgesics, knee injections, knee braces, physical therapy, stretching, strengthening, and weight loss strategies, activity modification, ambulatory assistive devices. The patient stated their understanding with this, but would like to proceed with surgical management in the form of a total knee arthroplasty. We did discuss the risks of surgery which include but are not limited to infection, nerve or blood vessel damage, failure of fixation, failure of any possible implant, need for reoperation, postoperative pain and swelling, intra-or postoperative fracture, postoperative dislocation, leg length inequality, need for reoperation, implant failure, death, disability, organ dysfunction, wound healing issues, DVT, PE, and the need for further procedures. The patient did freely state their understanding and satisfaction with our discussion. We will proceed after medical clearances. The patient was counseled at length about the risks of awa Covid-19 during their perioperative period and any recovery window from their procedure. The patient was made aware that awa Covid-19  may worsen their prognosis for recovering from their procedure and lend to a higher morbidity and/or mortality risk. All material risks, benefits, and reasonable alternatives including postponing the procedure were discussed. The patient does  wish to proceed with the procedure at this time. HPI: This is a 78 y.o. male who complains of right knee pain. Onset was gradual.  The patient has had activity dependent pain for years.     The patient has tried activity modification, physical therapy exercises, injections have failed to provide relief. The pain is in the knee, it is severe in intensity. The patient feels unstable with the knee, fears falling, and has significant limitation with activities of daily living, recreation, and walks with a limp. Past Medical History:   Diagnosis Date    Aortic regurgitation     Aortic stenosis     Atrial fibrillation (HCC)     Colon polyps     Diabetes mellitus, type 2 (HCC)     GI bleed     HTN (hypertension)        Past Surgical History:   Procedure Laterality Date    COLONOSCOPY N/A 3/16/2021    COL[facility] performed [facility] Renato Roberts [facility] at [facility] END[facility]COPY    HX AFIB ABLATION  2021    HX AMPUTATION TOE         Current Outpatient Medications on File Prior to Visit   Medication Sig Dispense Refill    amLODIPine (NORVASC) 5 mg tablet 1 tablet      metoprolol tartrate (LOPRESSOR) 25 mg tablet       losartan (COZAAR) 100 mg tablet Take 100 mg by mouth daily.  aspirin delayed-release 81 mg tablet Take 81 mg by mouth daily.  metFORMIN (GLUCOPHAGE) 500 mg tablet Take 500 mg by mouth daily (with breakfast).  multivitamin (ONE A DAY) tablet Take 1 Tab by mouth daily.  rosuvastatin (CRESTOR) 10 mg tablet Take 1 Tab by mouth nightly. Indications: hardening of the arteries due to plaque buildup 90 Tab 4    metoprolol succinate 25 mg CSpX 1 capsule (Patient not taking: Reported on 2022)      multivitamin (MULTIPLE VITAMIN ESSENTIAL PO)  (Patient not taking: Reported on 2022)      zinc gluconate 50 mg tablet 1 tablet (Patient not taking: Reported on 2022)       No current facility-administered medications on file prior to visit. No Known Allergies    No family history on file.     Social History     Socioeconomic History    Marital status:    Tobacco Use    Smoking status: Former Smoker     Types: Cigarettes     Quit date:      Years since quittin.4    Smokeless tobacco: Never Used   Substance and Sexual Activity    Drug use: Never    Sexual activity: Never     Social Determinants of Health     Physical Activity: Insufficiently Active    Days of Exercise per Week: 3 days    Minutes of Exercise per Session: 10 min         Review of Systems:       General: Denies headache, lethargy, fever, weight loss  Ears/Nose/Throat: Denies ear discharge, drainage, nosebleeds, hoarse voice, dental problems  Cardiovascular: Denies chest pain, shortness of breath  Lungs: Denies chest pain, breathing problems, wheezing, pneumonia  Stomach: Denies stomach pain, heartburn, constipation, irritable bowel  Skin: Denies rash, sores, open wounds  Musculoskeletal: Admits to knee pain  Genitourinary: Denies dysuria, hematuria, polyuria  Gastrointestinal: Denies constipation, obstipation, diarrhea  Neurological: Denies changes in sight, smell, hearing, taste, seizures. Denies loss of consciousness.   Psychiatric: Denies depression, sleep pattern changes, anxiety, change in personality  Endocrine: Denies mood swings, heat or cold intolerance  Hematologic/Lymphatic: Denies anemia, purpura, petechia  Allergic/Immunologic: Denies swelling of throat, pain or swelling at lymph nodes      Physical Examination:    Visit Vitals  BP (!) 152/79 (BP 1 Location: Right arm, BP Patient Position: Sitting, BP Cuff Size: Large adult)   Pulse 82   Temp 98.5 °F (36.9 °C) (Tympanic)   Ht 6' (1.829 m)   Wt 194 lb (88 kg)   SpO2 98%   BMI 26.31 kg/m²        General: AOX3, no apparent distress  Psychiatric: mood and affect appropriate  Lungs: breathing is symmetric and unlabored bilaterally  Heart: regular rate and rhythm  Abdomen: no guarding  Head: normocephalic, atraumatic  Skin: No significant abnormalities, good turgor  Sensation intact to light touch: L1-S1 dermatomes  Muscular exam: 5/5 strength in all major muscle groups unless noted in specialty exam.    Extremities:      Left upper extremity: Full active and passive range of motion without pain, deformity, no open wound, strength 5/5 in all major muscle groups. Right upper extremity: Full active and passive range of motion without pain, deformity, no open wound, strength 5/5 in all major muscle groups. Left lower extremity: Full active and passive range of motion without pain, deformity, no open wound, strength 5/5 in all major muscle groups. Right lower extremity:  No deformity is noted. Range of motion of the knee is 0-120. Ligamentous testing of the knee indicates stability of the the MCL, LCL, PCL, and ACL. Lachman's, anterior and posterior drawer tests are specifically negative. Medial joint line tenderness to palpation. Popliteal area is unremarkable. 1+  for effusion. + patellar crepitus. Patella tracks centrally + grind test.  Pivot shift is negative. Strength testing is indicative of 5/5 strength at hip flexion, extension, knee flexion and extension, tibialis anterior, EHL, and FHL. Sensation is intact to light touch in the L1-S1 dermatomes. Capillary refill is less than 2 seconds in the toes. Diagnostics:    Pertinent Xrays:  Xrays are available of the right knee. Bone on bone osteoarthritic changes of the right knee, osteophytes and subchondral sclerosis is noted. Mr. Jeovanny Marsh has a reminder for a \"due or due soon\" health maintenance. I have asked that he contact his primary care provider for follow-up on this health maintenance.

## 2022-06-20 ENCOUNTER — TELEPHONE (OUTPATIENT)
Dept: ORTHOPEDIC SURGERY | Age: 77
End: 2022-06-20

## 2022-06-20 NOTE — TELEPHONE ENCOUNTER
Contacted patient to schedule surgery. Scheduled for 7/25/22. Advised patient that clearances from PCP- Dr. Christine Delacruz and [facility] Dr. Bharat Wells would be required, and would need to be received no less that 2 business days before surgery. Patient advised to contact the office(s) to make pre op appts as soon as possible. Patient verbalized understanding and was encouraged to contact our office with any questions or concerns obtaining to upcoming surgery or required clearances. Clearance letters faxed to [phone] & [phone]. Confirmation was received.    Pt will be staying home with his wife post op if he should need anything.       ----- Message from Ash Murdock DO sent at 6/16/2022  8:59 PM EDT -----  Diagnosis: Unilateral Primary Osteoarthritis, right knee M17.11  Procedure: Right total knee arthroplasty   CPT: 84463  Operative minutes: 110  Inpatient  Location: [facility] Main OR  PAT: Yes  Class: Yes  Special Equipment: Regular table, Budny foot kaiser, Mary Triathlon, Plan for cemented TKA, foot kaiser for prepping  Staffing: Retractor kaiser  Anesthesia: spinal with adductor canal block

## 2022-06-23 ENCOUNTER — ANESTHESIA EVENT (OUTPATIENT)
Dept: ENDOSCOPY | Age: 77
End: 2022-06-23
Payer: COMMERCIAL

## 2022-06-23 ENCOUNTER — ANESTHESIA (OUTPATIENT)
Dept: ENDOSCOPY | Age: 77
End: 2022-06-23
Payer: COMMERCIAL

## 2022-06-23 ENCOUNTER — HOSPITAL ENCOUNTER (OUTPATIENT)
Age: 77
Setting detail: OUTPATIENT SURGERY
Discharge: HOME | End: 2022-06-23
Attending: INTERNAL MEDICINE | Admitting: INTERNAL MEDICINE
Payer: COMMERCIAL

## 2022-06-23 VITALS
HEIGHT: 72 IN | WEIGHT: 188.2 LBS | HEART RATE: 56 BPM | TEMPERATURE: 98 F | SYSTOLIC BLOOD PRESSURE: 124 MMHG | OXYGEN SATURATION: 98 % | BODY MASS INDEX: 25.49 KG/M2 | RESPIRATION RATE: 21 BRPM | DIASTOLIC BLOOD PRESSURE: 67 MMHG

## 2022-06-23 PROCEDURE — 77030010936 HC CLP LIG BSC -C: Performed by: INTERNAL MEDICINE

## 2022-06-23 PROCEDURE — 76060000032 HC ANESTHESIA 0.5 TO 1 HR: Performed by: INTERNAL MEDICINE

## 2022-06-23 PROCEDURE — 77030040684 HC NDL ENDOSC NEEDLEMASTR OCOA -B: Performed by: INTERNAL MEDICINE

## 2022-06-23 PROCEDURE — 77030037345 HC NET FB ENDO RETVR RESCUNT DISP BSC -B: Performed by: INTERNAL MEDICINE

## 2022-06-23 PROCEDURE — 76040000007: Performed by: INTERNAL MEDICINE

## 2022-06-23 PROCEDURE — 74011250636 HC RX REV CODE- 250/636: Performed by: NURSE ANESTHETIST, CERTIFIED REGISTERED

## 2022-06-23 PROCEDURE — 74011000250 HC RX REV CODE- 250: Performed by: NURSE ANESTHETIST, CERTIFIED REGISTERED

## 2022-06-23 PROCEDURE — 2709999900 HC NON-CHARGEABLE SUPPLY: Performed by: INTERNAL MEDICINE

## 2022-06-23 PROCEDURE — 88305 TISSUE EXAM BY PATHOLOGIST: CPT

## 2022-06-23 PROCEDURE — C1713 ANCHOR/SCREW BN/BN,TIS/BN: HCPCS | Performed by: INTERNAL MEDICINE

## 2022-06-23 DEVICE — WORKING LENGTH 235CM, WORKING CHANNEL 2.8MM
Type: IMPLANTABLE DEVICE | Site: RECTUM | Status: FUNCTIONAL
Brand: RESOLUTION 360 CLIP

## 2022-06-23 RX ORDER — PROPOFOL 10 MG/ML
INJECTION, EMULSION INTRAVENOUS
Status: DISCONTINUED | OUTPATIENT
Start: 2022-06-23 | End: 2022-06-23 | Stop reason: HOSPADM

## 2022-06-23 RX ORDER — SODIUM CHLORIDE 9 MG/ML
INJECTION, SOLUTION INTRAVENOUS
Status: DISCONTINUED | OUTPATIENT
Start: 2022-06-23 | End: 2022-06-23 | Stop reason: HOSPADM

## 2022-06-23 RX ORDER — PHENYLEPHRINE HCL IN 0.9% NACL 0.4MG/10ML
SYRINGE (ML) INTRAVENOUS AS NEEDED
Status: DISCONTINUED | OUTPATIENT
Start: 2022-06-23 | End: 2022-06-23 | Stop reason: HOSPADM

## 2022-06-23 RX ORDER — LIDOCAINE HYDROCHLORIDE 20 MG/ML
INJECTION, SOLUTION EPIDURAL; INFILTRATION; INTRACAUDAL; PERINEURAL AS NEEDED
Status: DISCONTINUED | OUTPATIENT
Start: 2022-06-23 | End: 2022-06-23 | Stop reason: HOSPADM

## 2022-06-23 RX ORDER — PROPOFOL 10 MG/ML
INJECTION, EMULSION INTRAVENOUS AS NEEDED
Status: DISCONTINUED | OUTPATIENT
Start: 2022-06-23 | End: 2022-06-23 | Stop reason: HOSPADM

## 2022-06-23 RX ADMIN — PROPOFOL 75 MCG/KG/MIN: 10 INJECTION, EMULSION INTRAVENOUS at 08:26

## 2022-06-23 RX ADMIN — PROPOFOL 50 MG: 10 INJECTION, EMULSION INTRAVENOUS at 08:26

## 2022-06-23 RX ADMIN — Medication 80 MCG: at 08:49

## 2022-06-23 RX ADMIN — SODIUM CHLORIDE: 900 INJECTION, SOLUTION INTRAVENOUS at 08:25

## 2022-06-23 RX ADMIN — Medication 80 MCG: at 09:00

## 2022-06-23 RX ADMIN — LIDOCAINE HYDROCHLORIDE 40 MG: 20 INJECTION, SOLUTION EPIDURAL; INFILTRATION; INTRACAUDAL; PERINEURAL at 08:26

## 2022-06-23 NOTE — PERIOP NOTES

## 2022-06-23 NOTE — DIS[place]GE INSTRUCTIONS
[facility] [place]    Katharine Qureshi  [id number]  1945    Discomfort:  Redness at IV site- apply warm compress to area; if redness or soreness persist- contact your physician  There may be a slight amount of blood passed from the rectum  Gaseous discomfort- walking, belching will help relieve any discomfort  You may not operate a vehicle for 12 hours  You may not engage in an occupation involving machinery or appliances for rest of today  You may not drink alcoholic beverages for at least 12 hours  Avoid making any critical decisions for at least 24 hour  DIET:  You may resume your regular diet - however -  remember your colon is empty and a heavy meal will produce gas. Avoid these foods:  vegetables, fried / greasy foods, carbonated drinks     ACTIVITY:  You may  resume your normal daily activities it is recommended that you spend the remainder of the day resting -  avoid any strenuous activity. CALL M.D. ANY SIGN OF:   Increasing pain, nausea, vomiting  Abdominal distension (swelling)  New increased bleeding (oral or rectal)  Fever (chills)  Pain in chest area  Bloody discharge from nose or mouth  Shortness of breath      Follow-up Instructions:   Call Dr. Glendy Cuevas for any questions or problems at [address] [place] [address] St:   Your colonoscopy showed four polyps, which were removed. We will contact you about the pathology results and when your next colonoscopy will be due. Telephone # [phone]      Signed By: Jessenia Purvis.  Marcus Nicolas MD     6/23/2022  9:08 AM       DISCHARGE SUMMARY from Nurse    The following personal items collected during your admission are returned to you:   Dental Appliance: Dental Appliances: None  Vision:    Hearing Aid:    Jewelry:    Clothing:    Other Valuables:    Valuables sent to safe:      Learning About Coronavirus (COVID-19)  Coronavirus (COVID-19): Overview  What is coronavirus ([id number])? The coronavirus disease (COVID-19) is caused by a virus. It is an illness that was first found in [place], [place], in December 2019. It has since spread worldwide. The virus can cause fever, cough, and trouble breathing. In severe cases, it can cause pneumonia and make it hard to breathe without help. It can cause death. Coronaviruses are a large group of viruses. They cause the common cold. They also cause more serious illnesses like Middle East respiratory syndrome (MERS) and severe acute respiratory syndrome (SARS). COVID-19 is caused by a novel coronavirus. That means it's a new type that has not been seen in people before. This virus spreads person-to-person through droplets from coughing and sneezing. It can also spread when you are close to someone who is infected. And it can spread when you touch something that has the virus on it, such as a doorknob or a tabletop. What can you do to protect yourself from coronavirus (COVID-19)? The best way to protect yourself from getting sick is to:  · Avoid areas where there is an outbreak. · Avoid contact with people who may be infected. · Wash your hands often with soap or alcohol-based hand sanitizers. · Avoid crowds and try to stay at least 6 feet away from other people. · Wash your hands often, especially after you cough or sneeze. Use soap and water, and scrub for at least 20 seconds. If soap and water aren't available, use an alcohol-based hand . · Avoid touching your mouth, nose, and eyes. What can you do to avoid spreading the virus to others? To help avoid spreading the virus to others:  · Cover your mouth with a tissue when you cough or sneeze. Then throw the tissue in the trash. · Use a disinfectant to clean things that you touch often. · Stay home if you are sick or have been exposed to the virus. Don't go to school, work, or public areas. And don't use public transportation.   · If you are sick:  ? Leave your home only if you need to get medical care. But call the doctor's office first so they know you're coming. And wear a face mask, if you have one.  ? If you have a face mask, wear it whenever you're around other people. It can help stop the spread of the virus when you cough or sneeze. ? Clean and disinfect your home every day. Use household  and disinfectant wipes or sprays. Take special care to clean things that you grab with your hands. These include doorknobs, remote controls, phones, and handles on your refrigerator and microwave. And don't forget countertops, tabletops, bathrooms, and computer keyboards. When to call for help  Call 911 anytime you think you may need emergency care. For example, call if:  · You have severe trouble breathing. (You can't talk at all.)  · You have constant chest pain or pressure. · You are severely dizzy or lightheaded. · You are confused or can't think clearly. · Your face and lips have a blue color. · You pass out (lose consciousness) or are very hard to wake up. Call your doctor now if you develop symptoms such as:  · Shortness of breath. · Fever. · Cough. If you need to get care, call ahead to the doctor's office for instructions before you go. Make sure you wear a face mask, if you have one, to prevent exposing other people to the virus. Where can you get the latest information? The following health organizations are tracking and studying this virus. Their websites contain the most up-to-date information. Cristiano Delgado also learn what to do if you think you may have been exposed to the virus. · U.S. Centers for Disease Control and Prevention (CDC): The CDC provides updated news about the disease and travel advice. The website also tells you how to prevent the spread of infection. www.cdc.gov  · World Health Organization [facility]): WHO offers information about the virus outbreaks.  WHO also has travel advice. www.who.int  Current as of: April 1, 2020               Content Version: 12.4  © 1309-7825 [employer]orporated. Care instructions adapted under license by your healthcare professional. If you have questions about a medical condition or this instruction, always ask your healthcare professional. [address] any warranty or liability for your use of this information.

## 2022-06-23 NOTE — H&P
[address] [place] [address], [address] [place] [address]      History and Physical       NAME:  Shahida Ndiaye   :   1945   MRN:   [id number]             History of Present Illness:  Patient is a 78 y. o. who is seen for history of colon polyps. Last colonoscopy was in  and a large rectal polyp was removed by colorectal surgery. PMH:  Past Medical History:   Diagnosis Date    Aortic regurgitation     Aortic stenosis     Arthritis     Atrial fibrillation (HCC)     Colon polyps     Diabetes mellitus, type 2 (HCC)     GI bleed     HTN (hypertension)        PSH:  Past Surgical History:   Procedure Laterality Date    COLONOSCOPY N/A 3/16/2021    COL[facility]COPY    HX AFIB ABLATION  2021    HX AMPUTATION TOE         Allergies:  No Known Allergies    Home Medications:  Prior to Admission Medications   Prescriptions Last Dose Informant Patient Reported? Taking? amLODIPine (NORVASC) 5 mg tablet 2022 at Unknown time  Yes Yes   Si tablet   aspirin delayed-release 81 mg tablet 2022 at Unknown time  Yes Yes   Sig: Take 81 mg by mouth daily. losartan (COZAAR) 100 mg tablet 2022 at Unknown time  Yes Yes   Sig: Take 100 mg by mouth daily. metFORMIN (GLUCOPHAGE) 500 mg tablet 2022 at Unknown time  Yes Yes   Sig: Take 500 mg by mouth daily (with breakfast). metoprolol succinate 25 mg CSpX 2022 at Unknown time  Yes Yes   Si capsule   metoprolol tartrate (LOPRESSOR) 25 mg tablet   Yes No   multivitamin (MULTIPLE VITAMIN ESSENTIAL PO) Not Taking at Unknown time  Yes No   Patient not taking: Reported on 2022   multivitamin (ONE A DAY) tablet 2022 at Unknown time  Yes Yes   Sig: Take 1 Tab by mouth daily. rosuvastatin (CRESTOR) 10 mg tablet 2022 at Unknown time  No Yes   Sig: Take 1 Tab by mouth nightly.  Indications: hardening of the arteries due to plaque buildup   zinc gluconate 50 mg tablet 2022 at Unknown time  Yes Yes   Si tablet      Facility-Administered Medications: None       Hospital Medications:  No current facility-administered medications for this encounter. Social History:  Social History     Tobacco Use    Smoking status: Former Smoker     Types: Cigarettes     Quit date: 1980     Years since quittin.5    Smokeless tobacco: Never Used   Substance Use Topics    Alcohol use: Not on file       Family History:  History reviewed. No pertinent family history. Review of Systems:      Constitutional: negative fever, negative chills, negative weight loss  Eyes:   negative visual changes  ENT:   negative sore throat, tongue or lip swelling  Respiratory:  negative cough, negative dyspnea  Cards:  negative for chest pain, palpitations, lower extremity edema  GI:   See HPI  :  negative for frequency, dysuria  Integument:  negative for rash and pruritus  Heme:  negative for easy bruising and gum/nose bleeding  Musculoskel: negative for myalgias,  back pain and muscle weakness  Neuro: negative for headaches, dizziness, vertigo  Psych:  negative for feelings of anxiety, depression       Objective:     Patient Vitals for the past 8 hrs:   BP Pulse Resp SpO2 Height Weight   22 0748 (!) 149/75 68 14 99 % -- --   22 0730 -- -- -- -- 6' (1.829 m) 85.4 kg (188 lb 3.2 oz)     No intake/output data recorded. No intake/output data recorded. EXAM:     NEURO-a&o   HEENT-wnl   LUNGS-clear    COR-regular rate and rhythym     ABD-soft , no tenderness, no rebound, good bs     EXT-no edema     Data Review     No results for input(s): WBC, HGB, HCT, PLT, HGBEXT, HCTEXT, PLTEXT in the last 72 hours. No results for input(s): NA, K, CL, CO2, BUN, CREA, GLU, PHOS, CA in the last 72 hours. No results for input(s): AP, TBIL, TP, ALB, GLOB, GGT, AML, LPSE in the last 72 hours.     No lab exists for component: SGOT, GPT, AMYP, HLPSE  No results for input(s): INR, PTP, APTT, INREXT in the last 72 hours. Assessment:     · History of colon polyps     Patient Active Problem List   Diagnosis Code    GIB (gastrointestinal bleeding) K92.2    Severe aortic stenosis I35.0    Aortic stenosis I35.0    S/P TAVR (transcatheter aortic valve replacement) Z95.2    Diabetic foot (HCC) E11.8    Osteomyelitis (HCC) M86.9    Chronic systolic (congestive) heart failure I50.22     Plan:   · The patient was counseled at length about the risks of awa Covid-19 in the may-operative and post-operative states including the recovery window of their procedure. The patient was made aware that awa Covid-19 after a surgical procedure may worsen their prognosis for recovering from the virus and lend to a higher morbidity and or mortality risk. The patient was given the options of postponing their procedure. All of the risks, benefits, and alternatives were discussed. The patient does wish to proceed with the procedure. · Endoscopic procedure with MAC     Signed By: Pam Copeland.  Melany Morrissey MD     6/23/2022  8:21 AM

## 2022-06-23 NOTE — PROCEDURES
[address] [address] [address]  [address] [facility], [address]      Colonoscopy Operative Report    Noman Wong  [id number]  1945      Procedure Type:   Colonoscopy with endoscopic mucosal resection     Indications: Personal history of colon polyps, including EMR of large rectal polyp        Pre-operative Diagnosis: see indication above    Post-operative Diagnosis:  See findings below    :  Francisco Herr MD    Staff: Endoscopy Marleen Parks: Rebekah Mendez  Endoscopy RN-1: Mary Mazariegos RN     Referring Provider: Bee Sams MD, Javid Palma MD      Sedation:  MAC anesthesia Propofol      Procedure Details:  After informed consent was obtained with all risks and benefits of procedure explained and preoperative exam completed, the patient was taken to the endoscopy suite and placed in the left lateral decubitus position. Upon sequential sedation as per above, a digital rectal exam was performed demonstrating internal hemorrhoids. The Olympus pediatric videocolonoscope  was inserted in the rectum and carefully advanced to the cecum, which was identified by the ileocecal valve and appendiceal orifice. The cecum was identified by the ileocecal valve and appendiceal orifice. The quality of preparation was good. The colonoscope was slowly withdrawn with careful evaluation between folds. Retroflexion in the rectum was completed . Findings:   Rectum: In the proximal rectum, previously placed tattoo was seen. Proximal to this was an approximately 23-25 sessile polyp located at site of prior resection. This was successfully lifted with ORISE gel lifting agent. This clearly demarcated the polyp borders. Next, The polyp was removed in one piece using an Olympus 20 mm stiff, braided, round snare. The resection site was closed with three Resolultion 360 clips. Sigmoid: moderate diverticulosis  Descending Colon: single semi-sessile 7-8 mm polyp - removed with hot snare.  Single Resolution 360 clip placed to close mucosal defect. Transverse Colon: normal  Ascending Colon: two 8-10 mm semi-sessile polyps - removed with hot snare. Single Resolution 360 clip placed at each site to close mucosal defect. Cecum: normal  Terminal Ileum: not intubated      Specimen Removed:  1. Ascending colon polyps, 2. Descending colon polyp, 3. Rectal polyp    Complications: None. EBL:  None. Impression:   1. Colon polyps - removed  2. Rectal polyp - residual/recurrent polyp seen at prior EMR site. This was removed with EMR as described above. Recommendations:  1. Follow up surgical pathology  2. Timing of repeat colonoscopy to be determined  3. High fiber diet education    Signed By: Kayleen Gregg.  Shakir Kramer MD     6/23/2022  9:10 AM

## 2022-06-23 NOTE — ANESTHESIA PREPROCEDURE EVALUATION
Relevant Problems   CARDIOVASCULAR   (+) Aortic stenosis   (+) Severe aortic stenosis      HEMATOLOGY   (+) Osteomyelitis (HCC)       Anesthetic History   No history of anesthetic complications            Review of Systems / Medical History  Patient summary reviewed, nursing notes reviewed and pertinent labs reviewed    Pulmonary  Within defined limits                 Neuro/Psych   Within defined limits           Cardiovascular    Hypertension        Dysrhythmias : atrial fibrillation      Exercise tolerance: <4 METS  Comments: TTE  · LV: Estimated LVEF is 65 - 70%. Borderline hyperdynamic LV systolic function. Normal cavity size and systolic function (ejection fraction normal). Mild concentric hypertrophy. Mild (grade 1) left ventricular diastolic dysfunction. · IAS: There was no shunting at baseline. · AV: Prosthetic aortic valve. Aortic valve mean gradient is 8 mmHg. EOA 3.09 cm2. There is a 29 mm Giles (+3 mL) prosthetic aortic valve from prior TAVR procedure. Prosthesis is normal. Trace paravalvular leak. · TV: Mild tricuspid valve regurgitation is present. Right Ventricular Arterial Pressure (RVSP) is 30 mmHg. Pulmonary hypertension not suggested by Doppler findings.     S/p TAVR   GI/Hepatic/Renal  Within defined limits              Endo/Other    Diabetes         Other Findings   Comments: Osteomyelitis  Atrial fibrillation   HTN (hypertension)  Diabetes mellitus, type 2  Aortic regurgitation  Colon polyps  GI bleed  Aortic stenosis- S/P TAVR 3/21/21           Physical Exam    Airway  Mallampati: II  TM Distance: 4 - 6 cm  Neck ROM: normal range of motion   Mouth opening: Normal     Cardiovascular  Regular rate and rhythm,  S1 and S2 normal,  no murmur, click, rub, or gallop  Rhythm: regular  Rate: normal         Dental  No notable dental hx       Pulmonary  Breath sounds clear to auscultation               Abdominal  GI exam deferred       Other Findings            Anesthetic Plan    ASA: 3  Anesthesia type: MAC          Induction: Intravenous  Anesthetic plan and risks discussed with: Patient

## 2022-06-23 NOTE — ANESTHESIA POSTPROCEDURE EVALUATION
Post-Anesthesia Evaluation and Assessment    Patient: Beatriz Kate MRN: [id number]  SSN: [id number]    YOB: 1945  Age: 78 y.o. Sex: male      I have evaluated the patient and they are stable and ready for discharge from the PACU. Cardiovascular Function/Vital Signs  Visit Vitals  /67   Pulse (!) 56   Temp 36.7 °C (98 °F)   Resp 21   Ht 6' (1.829 m)   Wt 85.4 kg (188 lb 3.2 oz)   SpO2 98%   BMI 25.52 kg/m²       Patient is status post MAC anesthesia for Procedure(s):  COLONOSCOPY  ENDOSCOPIC POLYPECTOMY  INJECTION  RESOLUTION CLIP  ENDOSCOPIC FOREIGN BODY REMOVAL. Nausea/Vomiting: None    Postoperative hydration reviewed and adequate. Pain:  Pain Scale 1: Numeric (0 - 10) (06/23/22 0925)  Pain Intensity 1: 0 (06/23/22 0925)   Managed    Neurological Status: At baseline    Mental Status, Level of Consciousness: Alert and  oriented to person, place, and time    Pulmonary Status:   O2 Device: None (Room air) (06/23/22 0925)   Adequate oxygenation and airway patent    Complications related to anesthesia: None    Post-anesthesia assessment completed. No concerns    Signed By: Curley Angelucci, MD     June 23, 2022              Procedure(s):  COLONOSCOPY  ENDOSCOPIC POLYPECTOMY  INJECTION  RESOLUTION CLIP  ENDOSCOPIC FOREIGN BODY REMOVAL. MAC    <[email or web address]>    INITIAL Post-op Vital signs:   Vitals Value Taken Time   /67 06/23/22 0925   Temp     Pulse 56 06/23/22 0928   Resp 19 06/23/22 0928   SpO2 99 % 06/23/22 0927   Vitals shown include unvalidated device data.

## 2022-07-07 ENCOUNTER — TELEPHONE (OUTPATIENT)
Dept: ORTHOPEDIC SURGERY | Age: 77
End: 2022-07-07

## 2022-10-13 NOTE — TELEPHONE ENCOUNTER
Echo and 1yr F/U TAVR scheduled for 4/20/22
Leonel Coley, please schedule him for a 1 year TAVR follow up with an Echo by May 1st. The echo has been ordered. Thank you!
2.02

## 2025-02-05 NOTE — PROGRESS NOTES
DISCHARGE NOTE FROM [facility] NURSE    Patient determined to be stable for discharge by attending provider. I have reviewed the discharge instructions and follow-up appointments with the patient. They verbalized understanding and all questions were answered to their satisfaction. No complaints or further questions were expressed. Medications sent to pharmacy. Appropriate educational materials and medication side effect teaching were provided. N/A were removed prior to discharge. PICC line care provided to patient by [facility]. Reviewed with patient and wife. All personal items collected during admission were returned to the patient prior to discharge.     Post-op patient: Jackeline Paez RN Quality 431: Preventive Care And Screening: Unhealthy Alcohol Use - Screening: Patient not identified as an unhealthy alcohol user when screened for unhealthy alcohol use using a systematic screening method Quality 130: Documentation Of Current Medications In The Medical Record: Current Medications Documented Detail Level: Simple Quality 226: Preventive Care And Screening: Tobacco Use: Screening And Cessation Intervention: Patient screened for tobacco use and is an ex/non-smoker

## (undated) DEVICE — GEL SUBMUCOSAL LIFT AGNT -- ORISE

## (undated) DEVICE — PREP SKN CHLRAPRP APL 26ML STR --

## (undated) DEVICE — ANGIOGRAPHIC CATHETER: Brand: IMPULSE™

## (undated) DEVICE — MICROPUNCTURE INTRODUCER SET SILHOUETTE TRANSITIONLESS WITH STAINLESS STEEL WIRE GUIDE: Brand: MICROPUNCTURE

## (undated) DEVICE — SUTURE ETHLN SZ 4-0 L18IN NONABSORBABLE BLK L19MM PS-2 3/8 1667H

## (undated) DEVICE — YANKAUER,BULB TIP,W/O VENT,RIGID,STERILE: Brand: MEDLINE

## (undated) DEVICE — TRAY PREP DRY W/ PREM GLV 2 APPL 6 SPNG 2 UNDPD 1 OVERWRAP

## (undated) DEVICE — TRAP SURG QUAD PARABOLA SLOT DSGN SFTY SCRN TRAPEASE

## (undated) DEVICE — BANDAGE,GAUZE,CONFORMING,3"X75",STRL,LF: Brand: MEDLINE

## (undated) DEVICE — SOLUTION IRRIG 3000ML 0.9% SOD CHL USP UROMATIC PLAS CONT

## (undated) DEVICE — RETRIEVER ENDOSCP L230CM DIA2.5MM NET W3XL5.5CM MIN WRK CHN

## (undated) DEVICE — ANGIO-SEAL VIP VASCULAR CLOSURE DEVICE: Brand: ANGIO-SEAL

## (undated) DEVICE — TUBING HYDR IRR --

## (undated) DEVICE — SPECIAL PROCEDURE DRAPE 32" X 34": Brand: SPECIAL PROCEDURE DRAPE

## (undated) DEVICE — ANGIOGRAPHY KIT

## (undated) DEVICE — WRAP SURG W1.31XL1.34M CARD FOR PT 165-172CM THERMOWRP

## (undated) DEVICE — GLOVE ORTHO 8   MSG9480

## (undated) DEVICE — GUIDEWIRE VASC L260CM DIA0.035IN TIP L3MM STD EXCHG PTFE J

## (undated) DEVICE — STERILE POLYISOPRENE POWDER-FREE SURGICAL GLOVES: Brand: PROTEXIS

## (undated) DEVICE — MARKER ENDOSCOPIC 10ML INDIA INK STERILE

## (undated) DEVICE — DRAPE PRB US TRNSDCR 6X96IN --

## (undated) DEVICE — GDWIRE COR 0.035INX260CM -- CONFIDA

## (undated) DEVICE — GDWIRE ANGIO SUP STF 0.035IN --

## (undated) DEVICE — PERCLOSE PROGLIDE™ SUTURE-MEDIATED CLOSURE SYSTEM: Brand: PERCLOSE PROGLIDE™

## (undated) DEVICE — TUBING SUCT 12FR MAL ALUM SHFT FN CAP VENT UNIV CONN W/ OBT

## (undated) DEVICE — SPONGE GZ W4XL4IN COT 12 PLY TYP VII WVN C FLD DSGN

## (undated) DEVICE — SYR LR LCK 1ML GRAD NSAF 30ML --

## (undated) DEVICE — KIT MFLD ISOLATN NACL CNTRST PRT TBNG SPIK W/ PRSS TRNSDUC

## (undated) DEVICE — BANDAGE COBAN 4 IN COMPR W4INXL5YD FOAM COHESIVE QUIK STK SELF ADH SFT

## (undated) DEVICE — SYR 50ML LR LCK 1ML GRAD NSAF --

## (undated) DEVICE — REM POLYHESIVE ADULT PATIENT RETURN ELECTRODE: Brand: VALLEYLAB

## (undated) DEVICE — ZIMMER® STERILE DISPOSABLE TOURNIQUET CUFF WITH PROTECTIVE SLEEVE AND PLC, DUAL PORT, SINGLE BLADDER, 18 IN. (46 CM)

## (undated) DEVICE — TR BAND RADIAL ARTERY COMPRESSION DEVICE: Brand: TR BAND

## (undated) DEVICE — PAD,ABDOMINAL,5"X9",ST,LF,25/BX: Brand: MEDLINE INDUSTRIES, INC.

## (undated) DEVICE — THIN OFFSET (9.0 X 0.38 X 25.0MM)

## (undated) DEVICE — 6 FOOT DISPOSABLE EXTENSION CABLE WITH SAFE CONNECT / SCREW-DOWN

## (undated) DEVICE — SOL IRRIGATION INJ NACL 0.9% 500ML BTL

## (undated) DEVICE — SPLINT WR POS F/ARTERIAL ACC -- BX/10

## (undated) DEVICE — Device

## (undated) DEVICE — PACK PROCEDURE SURG HRT CATH

## (undated) DEVICE — NEEDLE BX 11GA L101MM BNE MAR ASPIR W/ ADPT JAMSH

## (undated) DEVICE — SNARE ENDOSCP M L240CM W27MM SHTH DIA2.4MM CHN 2.8MM OVL

## (undated) DEVICE — Device: Brand: SINGLE USE INJECTOR NM600/610

## (undated) DEVICE — SUTURE VCRL SZ 3-0 L27IN ABSRB UD L26MM SH 1/2 CIR J416H

## (undated) DEVICE — SUTURE NONABSORBABLE MONOFILAMENT 5-0 PS-2 18 IN BLK ETHILON 1666H

## (undated) DEVICE — DRSG GZ OIL EMUL CURAD 3X3 --

## (undated) DEVICE — STERILE POLYISOPRENE POWDER-FREE SURGICAL GLOVES WITH EMOLLIENT COATING: Brand: PROTEXIS

## (undated) DEVICE — GRADUATED BOWL: Brand: DEVON

## (undated) DEVICE — CULTURETTE SGL EVAC TUBE PALL -- 100/CA

## (undated) DEVICE — GUIDEWIRE VASC L260CM DIA0.035IN RAD 3MM J TIP L7CM PTFE

## (undated) DEVICE — DRAPE XR C ARM 41X74IN LF --

## (undated) DEVICE — PINNACLE INTRODUCER SHEATH: Brand: PINNACLE

## (undated) DEVICE — 3M™ TEGADERM™ TRANSPARENT FILM DRESSING FRAME STYLE, 1626W, 4 IN X 4-3/4 IN (10 CM X 12 CM), 50/CT 4CT/CASE: Brand: 3M™ TEGADERM™

## (undated) DEVICE — FORCEPS BX L240CM JAW DIA2.8MM L CAP W/ NDL MIC MESH TOOTH

## (undated) DEVICE — COVER,TABLE,60X90,STERILE: Brand: MEDLINE

## (undated) DEVICE — GOWN,SIRUS,FABRNF,XL,20/CS: Brand: MEDLINE

## (undated) DEVICE — CATH DIAG IMPLS PIG 6FRX100CM -- IMPULSE 16599-40

## (undated) DEVICE — BANDAGE,GAUZE,BULKEE II,4.5"X4.1YD,STRL: Brand: MEDLINE

## (undated) DEVICE — GLIDESHEATH SLENDER STAINLESS STEEL KIT: Brand: GLIDESHEATH SLENDER

## (undated) DEVICE — EXTREMITY-MRMC: Brand: MEDLINE INDUSTRIES, INC.

## (undated) DEVICE — KIT HND CTRL 3 W STPCOCK ROT END 54IN PREM HI PRSS TBNG AT

## (undated) DEVICE — SNARE POLYP SM AD W13MMXL240CM SHTH DIA2.4MM HEX STIFF

## (undated) DEVICE — SWAB CULT LIQ STUART AGR AERB MOD IN BRK SGL RAYON TIP PLAS 220099] BECTON DICKINSON MICRO]

## (undated) DEVICE — GUIDEWIRE VASC L150CM DIA0.035IN FLX TIP L7CM PTFE STR FIX

## (undated) DEVICE — INFECTION CONTROL KIT SYS

## (undated) DEVICE — NEEDLE HYPO 18GA L1.5IN PNK S STL HUB POLYPR SHLD REG BVL

## (undated) DEVICE — 1000ML,PRESSURE INFUSER W/STOPCOCK: Brand: MEDLINE

## (undated) DEVICE — LUER-LOK 360°: Brand: CONNECTA, LUER-LOK

## (undated) DEVICE — KIT MED IMAG CNTRST AGNT W/ IOPAMIDOL REUSE

## (undated) DEVICE — RADIFOCUS OPTITORQUE ANGIOGRAPHIC CATHETER: Brand: OPTITORQUE

## (undated) DEVICE — INTENDED TO STANDARDIZE OR CAMERAS TO ALLOW FOR THE USE OF THE OR CAMERA COVER: Brand: ASPEN® O.R. CAMERA COVER

## (undated) DEVICE — GUIDEWIRE VASC L150CM DIA0.035IN TIP L3MM PTFE J CRV FIX

## (undated) DEVICE — INSTINCT ENDOSCOPIC HEMOCLIP: Brand: INSTINCT